# Patient Record
Sex: FEMALE | Race: WHITE | NOT HISPANIC OR LATINO | URBAN - METROPOLITAN AREA
[De-identification: names, ages, dates, MRNs, and addresses within clinical notes are randomized per-mention and may not be internally consistent; named-entity substitution may affect disease eponyms.]

---

## 2017-01-17 ENCOUNTER — OFFICE (AMBULATORY)
Dept: URBAN - METROPOLITAN AREA CLINIC 75 | Facility: CLINIC | Age: 67
End: 2017-01-17

## 2017-01-17 VITALS
DIASTOLIC BLOOD PRESSURE: 78 MMHG | HEIGHT: 68 IN | WEIGHT: 240 LBS | SYSTOLIC BLOOD PRESSURE: 136 MMHG | HEART RATE: 68 BPM

## 2017-01-17 DIAGNOSIS — Z86.010 PERSONAL HISTORY OF COLONIC POLYPS: ICD-10-CM

## 2017-01-17 DIAGNOSIS — R14.0 ABDOMINAL DISTENSION (GASEOUS): ICD-10-CM

## 2017-01-17 DIAGNOSIS — K21.9 GASTRO-ESOPHAGEAL REFLUX DISEASE WITHOUT ESOPHAGITIS: ICD-10-CM

## 2017-01-17 DIAGNOSIS — R11.0 NAUSEA: ICD-10-CM

## 2017-01-17 DIAGNOSIS — K58.9 IRRITABLE BOWEL SYNDROME WITHOUT DIARRHEA: ICD-10-CM

## 2017-01-17 DIAGNOSIS — K30 FUNCTIONAL DYSPEPSIA: ICD-10-CM

## 2017-01-17 PROCEDURE — 99204 OFFICE O/P NEW MOD 45 MIN: CPT | Performed by: INTERNAL MEDICINE

## 2017-01-17 RX ORDER — OMEPRAZOLE 40 MG/1
80 CAPSULE, DELAYED RELEASE ORAL
Qty: 60 | Refills: 6 | Status: COMPLETED
End: 2019-10-22

## 2017-07-18 ENCOUNTER — OFFICE (AMBULATORY)
Dept: URBAN - METROPOLITAN AREA CLINIC 75 | Facility: CLINIC | Age: 67
End: 2017-07-18

## 2017-07-18 VITALS
HEART RATE: 60 BPM | WEIGHT: 241 LBS | HEIGHT: 68 IN | SYSTOLIC BLOOD PRESSURE: 124 MMHG | DIASTOLIC BLOOD PRESSURE: 80 MMHG

## 2017-07-18 DIAGNOSIS — Z86.010 PERSONAL HISTORY OF COLONIC POLYPS: ICD-10-CM

## 2017-07-18 DIAGNOSIS — K59.00 CONSTIPATION, UNSPECIFIED: ICD-10-CM

## 2017-07-18 DIAGNOSIS — K29.50 UNSPECIFIED CHRONIC GASTRITIS WITHOUT BLEEDING: ICD-10-CM

## 2017-07-18 DIAGNOSIS — K58.9 IRRITABLE BOWEL SYNDROME WITHOUT DIARRHEA: ICD-10-CM

## 2017-07-18 DIAGNOSIS — K21.9 GASTRO-ESOPHAGEAL REFLUX DISEASE WITHOUT ESOPHAGITIS: ICD-10-CM

## 2017-07-18 DIAGNOSIS — R14.0 ABDOMINAL DISTENSION (GASEOUS): ICD-10-CM

## 2017-07-18 DIAGNOSIS — K30 FUNCTIONAL DYSPEPSIA: ICD-10-CM

## 2017-07-18 DIAGNOSIS — K14.6 GLOSSODYNIA: ICD-10-CM

## 2017-07-18 PROCEDURE — 99213 OFFICE O/P EST LOW 20 MIN: CPT | Performed by: INTERNAL MEDICINE

## 2017-07-18 RX ORDER — CLONAZEPAM 0.5 MG/1
1.5 TABLET ORAL
Qty: 45 | Refills: 2 | Status: COMPLETED
Start: 2017-07-18 | End: 2017-08-21

## 2017-07-18 RX ORDER — OMEPRAZOLE 40 MG/1
80 CAPSULE, DELAYED RELEASE ORAL
Qty: 60 | Refills: 6 | Status: COMPLETED
End: 2019-10-22

## 2017-08-17 VITALS
DIASTOLIC BLOOD PRESSURE: 74 MMHG | HEART RATE: 68 BPM | HEIGHT: 68 IN | DIASTOLIC BLOOD PRESSURE: 79 MMHG | RESPIRATION RATE: 13 BRPM | SYSTOLIC BLOOD PRESSURE: 139 MMHG | HEART RATE: 63 BPM | DIASTOLIC BLOOD PRESSURE: 93 MMHG | DIASTOLIC BLOOD PRESSURE: 87 MMHG | SYSTOLIC BLOOD PRESSURE: 160 MMHG | WEIGHT: 241 LBS | HEART RATE: 70 BPM | SYSTOLIC BLOOD PRESSURE: 159 MMHG | DIASTOLIC BLOOD PRESSURE: 77 MMHG | OXYGEN SATURATION: 94 % | RESPIRATION RATE: 3 BRPM | OXYGEN SATURATION: 99 % | OXYGEN SATURATION: 76 % | RESPIRATION RATE: 20 BRPM | SYSTOLIC BLOOD PRESSURE: 156 MMHG | SYSTOLIC BLOOD PRESSURE: 161 MMHG | RESPIRATION RATE: 16 BRPM | SYSTOLIC BLOOD PRESSURE: 149 MMHG | DIASTOLIC BLOOD PRESSURE: 78 MMHG | SYSTOLIC BLOOD PRESSURE: 138 MMHG | HEART RATE: 59 BPM | TEMPERATURE: 98 F | HEART RATE: 60 BPM | TEMPERATURE: 98.6 F | OXYGEN SATURATION: 96 % | OXYGEN SATURATION: 98 % | HEART RATE: 65 BPM | OXYGEN SATURATION: 97 %

## 2017-08-21 ENCOUNTER — OFFICE (AMBULATORY)
Dept: URBAN - METROPOLITAN AREA CLINIC 64 | Facility: CLINIC | Age: 67
End: 2017-08-21

## 2017-08-21 ENCOUNTER — AMBULATORY SURGICAL CENTER (AMBULATORY)
Dept: URBAN - METROPOLITAN AREA SURGERY 17 | Facility: SURGERY | Age: 67
End: 2017-08-21
Payer: MEDICAID

## 2017-08-21 DIAGNOSIS — K21.9 GASTRO-ESOPHAGEAL REFLUX DISEASE WITHOUT ESOPHAGITIS: ICD-10-CM

## 2017-08-21 DIAGNOSIS — R11.0 NAUSEA: ICD-10-CM

## 2017-08-21 DIAGNOSIS — R14.0 ABDOMINAL DISTENSION (GASEOUS): ICD-10-CM

## 2017-08-21 DIAGNOSIS — K31.7 POLYP OF STOMACH AND DUODENUM: ICD-10-CM

## 2017-08-21 DIAGNOSIS — Z87.11 PERSONAL HISTORY OF PEPTIC ULCER DISEASE: ICD-10-CM

## 2017-08-21 DIAGNOSIS — K14.6 GLOSSODYNIA: ICD-10-CM

## 2017-08-21 LAB
GI HISTOLOGY: A. UNSPECIFIED: (no result)
GI HISTOLOGY: B. UNSPECIFIED: (no result)
GI HISTOLOGY: C. SELECT: (no result)
GI HISTOLOGY: D. UNSPECIFIED: (no result)
GI HISTOLOGY: PDF REPORT: (no result)

## 2017-08-21 PROCEDURE — 88305 TISSUE EXAM BY PATHOLOGIST: CPT | Performed by: INTERNAL MEDICINE

## 2017-08-21 PROCEDURE — 43239 EGD BIOPSY SINGLE/MULTIPLE: CPT | Performed by: INTERNAL MEDICINE

## 2017-08-21 RX ADMIN — PROPOFOL 100 MG: 10 INJECTION, EMULSION INTRAVENOUS at 15:07

## 2017-08-21 RX ADMIN — PROPOFOL 75 MG: 10 INJECTION, EMULSION INTRAVENOUS at 15:16

## 2017-08-21 RX ADMIN — PROPOFOL 25 MG: 10 INJECTION, EMULSION INTRAVENOUS at 15:08

## 2017-08-21 RX ADMIN — PROPOFOL 50 MG: 10 INJECTION, EMULSION INTRAVENOUS at 15:11

## 2017-12-19 ENCOUNTER — OFFICE (AMBULATORY)
Dept: URBAN - METROPOLITAN AREA CLINIC 75 | Facility: CLINIC | Age: 67
End: 2017-12-19
Payer: MEDICAID

## 2017-12-19 VITALS
DIASTOLIC BLOOD PRESSURE: 70 MMHG | HEART RATE: 64 BPM | WEIGHT: 246 LBS | SYSTOLIC BLOOD PRESSURE: 140 MMHG | HEIGHT: 68 IN

## 2017-12-19 DIAGNOSIS — R11.0 NAUSEA: ICD-10-CM

## 2017-12-19 DIAGNOSIS — K21.9 GASTRO-ESOPHAGEAL REFLUX DISEASE WITHOUT ESOPHAGITIS: ICD-10-CM

## 2017-12-19 DIAGNOSIS — Z86.010 PERSONAL HISTORY OF COLONIC POLYPS: ICD-10-CM

## 2017-12-19 DIAGNOSIS — K30 FUNCTIONAL DYSPEPSIA: ICD-10-CM

## 2017-12-19 DIAGNOSIS — R14.0 ABDOMINAL DISTENSION (GASEOUS): ICD-10-CM

## 2017-12-19 DIAGNOSIS — K31.89 OTHER DISEASES OF STOMACH AND DUODENUM: ICD-10-CM

## 2017-12-19 DIAGNOSIS — K58.9 IRRITABLE BOWEL SYNDROME WITHOUT DIARRHEA: ICD-10-CM

## 2017-12-19 DIAGNOSIS — K59.00 CONSTIPATION, UNSPECIFIED: ICD-10-CM

## 2017-12-19 DIAGNOSIS — K14.6 GLOSSODYNIA: ICD-10-CM

## 2017-12-19 DIAGNOSIS — K29.50 UNSPECIFIED CHRONIC GASTRITIS WITHOUT BLEEDING: ICD-10-CM

## 2017-12-19 DIAGNOSIS — K29.70 GASTRITIS, UNSPECIFIED, WITHOUT BLEEDING: ICD-10-CM

## 2017-12-19 PROCEDURE — 99212 OFFICE O/P EST SF 10 MIN: CPT | Performed by: NURSE PRACTITIONER

## 2017-12-19 RX ORDER — OMEPRAZOLE 40 MG/1
80 CAPSULE, DELAYED RELEASE ORAL
Qty: 60 | Refills: 6 | Status: COMPLETED
End: 2019-10-22

## 2018-02-23 ENCOUNTER — OFFICE VISIT CONVERTED (OUTPATIENT)
Dept: CARDIOLOGY | Facility: CLINIC | Age: 68
End: 2018-02-23
Attending: INTERNAL MEDICINE

## 2018-02-23 ENCOUNTER — CONVERSION ENCOUNTER (OUTPATIENT)
Dept: CARDIOLOGY | Facility: CLINIC | Age: 68
End: 2018-02-23

## 2018-03-21 ENCOUNTER — OFFICE (AMBULATORY)
Dept: URBAN - METROPOLITAN AREA CLINIC 75 | Facility: CLINIC | Age: 68
End: 2018-03-21

## 2018-03-21 VITALS
WEIGHT: 245 LBS | HEART RATE: 68 BPM | SYSTOLIC BLOOD PRESSURE: 106 MMHG | DIASTOLIC BLOOD PRESSURE: 68 MMHG | HEIGHT: 68 IN

## 2018-03-21 DIAGNOSIS — K30 FUNCTIONAL DYSPEPSIA: ICD-10-CM

## 2018-03-21 DIAGNOSIS — K21.9 GASTRO-ESOPHAGEAL REFLUX DISEASE WITHOUT ESOPHAGITIS: ICD-10-CM

## 2018-03-21 DIAGNOSIS — K14.6 GLOSSODYNIA: ICD-10-CM

## 2018-03-21 DIAGNOSIS — K29.70 GASTRITIS, UNSPECIFIED, WITHOUT BLEEDING: ICD-10-CM

## 2018-03-21 DIAGNOSIS — R14.0 ABDOMINAL DISTENSION (GASEOUS): ICD-10-CM

## 2018-03-21 DIAGNOSIS — R11.0 NAUSEA: ICD-10-CM

## 2018-03-21 DIAGNOSIS — K29.50 UNSPECIFIED CHRONIC GASTRITIS WITHOUT BLEEDING: ICD-10-CM

## 2018-03-21 DIAGNOSIS — K58.9 IRRITABLE BOWEL SYNDROME WITHOUT DIARRHEA: ICD-10-CM

## 2018-03-21 DIAGNOSIS — K59.00 CONSTIPATION, UNSPECIFIED: ICD-10-CM

## 2018-03-21 DIAGNOSIS — K31.84 GASTROPARESIS: ICD-10-CM

## 2018-03-21 DIAGNOSIS — Z86.010 PERSONAL HISTORY OF COLONIC POLYPS: ICD-10-CM

## 2018-03-21 PROCEDURE — 99213 OFFICE O/P EST LOW 20 MIN: CPT | Performed by: INTERNAL MEDICINE

## 2018-03-21 RX ORDER — ERYTHROMYCIN ETHYLSUCCINATE 200 MG/5ML
4000 GRANULE, FOR SUSPENSION ORAL
Qty: 600 | Refills: 0 | Status: COMPLETED
Start: 2018-03-21 | End: 2018-06-12

## 2018-03-21 RX ORDER — PROCHLORPERAZINE MALEATE 10 MG/1
30 TABLET ORAL
Qty: 90 | Refills: 6 | Status: COMPLETED
Start: 2018-03-21 | End: 2018-06-12

## 2018-04-24 ENCOUNTER — OFFICE (AMBULATORY)
Dept: URBAN - METROPOLITAN AREA CLINIC 75 | Facility: CLINIC | Age: 68
End: 2018-04-24
Payer: MEDICAID

## 2018-04-24 VITALS
SYSTOLIC BLOOD PRESSURE: 118 MMHG | HEART RATE: 64 BPM | HEIGHT: 68 IN | WEIGHT: 248 LBS | DIASTOLIC BLOOD PRESSURE: 70 MMHG

## 2018-04-24 DIAGNOSIS — K14.6 GLOSSODYNIA: ICD-10-CM

## 2018-04-24 DIAGNOSIS — K31.89 OTHER DISEASES OF STOMACH AND DUODENUM: ICD-10-CM

## 2018-04-24 DIAGNOSIS — K29.50 UNSPECIFIED CHRONIC GASTRITIS WITHOUT BLEEDING: ICD-10-CM

## 2018-04-24 DIAGNOSIS — R14.0 ABDOMINAL DISTENSION (GASEOUS): ICD-10-CM

## 2018-04-24 DIAGNOSIS — R11.0 NAUSEA: ICD-10-CM

## 2018-04-24 DIAGNOSIS — K59.00 CONSTIPATION, UNSPECIFIED: ICD-10-CM

## 2018-04-24 DIAGNOSIS — K21.9 GASTRO-ESOPHAGEAL REFLUX DISEASE WITHOUT ESOPHAGITIS: ICD-10-CM

## 2018-04-24 DIAGNOSIS — K58.9 IRRITABLE BOWEL SYNDROME WITHOUT DIARRHEA: ICD-10-CM

## 2018-04-24 DIAGNOSIS — K30 FUNCTIONAL DYSPEPSIA: ICD-10-CM

## 2018-04-24 DIAGNOSIS — K31.84 GASTROPARESIS: ICD-10-CM

## 2018-04-24 DIAGNOSIS — K29.70 GASTRITIS, UNSPECIFIED, WITHOUT BLEEDING: ICD-10-CM

## 2018-04-24 PROCEDURE — 99214 OFFICE O/P EST MOD 30 MIN: CPT | Performed by: NURSE PRACTITIONER

## 2018-05-09 ENCOUNTER — OFFICE VISIT CONVERTED (OUTPATIENT)
Dept: OTOLARYNGOLOGY | Facility: CLINIC | Age: 68
End: 2018-05-09
Attending: OTOLARYNGOLOGY

## 2018-05-23 ENCOUNTER — OFFICE VISIT CONVERTED (OUTPATIENT)
Dept: OTOLARYNGOLOGY | Facility: CLINIC | Age: 68
End: 2018-05-23
Attending: OTOLARYNGOLOGY

## 2018-05-23 ENCOUNTER — CONVERSION ENCOUNTER (OUTPATIENT)
Dept: OTOLARYNGOLOGY | Facility: CLINIC | Age: 68
End: 2018-05-23

## 2018-06-12 ENCOUNTER — OFFICE (AMBULATORY)
Dept: URBAN - METROPOLITAN AREA CLINIC 75 | Facility: CLINIC | Age: 68
End: 2018-06-12
Payer: MEDICAID

## 2018-06-12 VITALS
HEIGHT: 68 IN | DIASTOLIC BLOOD PRESSURE: 80 MMHG | SYSTOLIC BLOOD PRESSURE: 142 MMHG | HEART RATE: 70 BPM | WEIGHT: 250 LBS

## 2018-06-12 DIAGNOSIS — K59.00 CONSTIPATION, UNSPECIFIED: ICD-10-CM

## 2018-06-12 DIAGNOSIS — K21.9 GASTRO-ESOPHAGEAL REFLUX DISEASE WITHOUT ESOPHAGITIS: ICD-10-CM

## 2018-06-12 DIAGNOSIS — K58.9 IRRITABLE BOWEL SYNDROME WITHOUT DIARRHEA: ICD-10-CM

## 2018-06-12 DIAGNOSIS — K31.84 GASTROPARESIS: ICD-10-CM

## 2018-06-12 DIAGNOSIS — K30 FUNCTIONAL DYSPEPSIA: ICD-10-CM

## 2018-06-12 DIAGNOSIS — R11.0 NAUSEA: ICD-10-CM

## 2018-06-12 DIAGNOSIS — K29.50 UNSPECIFIED CHRONIC GASTRITIS WITHOUT BLEEDING: ICD-10-CM

## 2018-06-12 DIAGNOSIS — K29.70 GASTRITIS, UNSPECIFIED, WITHOUT BLEEDING: ICD-10-CM

## 2018-06-12 DIAGNOSIS — K14.6 GLOSSODYNIA: ICD-10-CM

## 2018-06-12 DIAGNOSIS — R14.0 ABDOMINAL DISTENSION (GASEOUS): ICD-10-CM

## 2018-06-12 DIAGNOSIS — K31.89 OTHER DISEASES OF STOMACH AND DUODENUM: ICD-10-CM

## 2018-06-12 PROCEDURE — 99214 OFFICE O/P EST MOD 30 MIN: CPT | Performed by: NURSE PRACTITIONER

## 2018-08-24 ENCOUNTER — CONVERSION ENCOUNTER (OUTPATIENT)
Dept: CARDIOLOGY | Facility: CLINIC | Age: 68
End: 2018-08-24

## 2018-08-24 ENCOUNTER — OFFICE VISIT CONVERTED (OUTPATIENT)
Dept: CARDIOLOGY | Facility: CLINIC | Age: 68
End: 2018-08-24
Attending: INTERNAL MEDICINE

## 2018-10-09 ENCOUNTER — OFFICE (AMBULATORY)
Dept: URBAN - METROPOLITAN AREA CLINIC 75 | Facility: CLINIC | Age: 68
End: 2018-10-09

## 2018-10-09 VITALS
WEIGHT: 254 LBS | DIASTOLIC BLOOD PRESSURE: 84 MMHG | HEIGHT: 68 IN | SYSTOLIC BLOOD PRESSURE: 132 MMHG | HEART RATE: 68 BPM

## 2018-10-09 DIAGNOSIS — K30 FUNCTIONAL DYSPEPSIA: ICD-10-CM

## 2018-10-09 DIAGNOSIS — K21.9 GASTRO-ESOPHAGEAL REFLUX DISEASE WITHOUT ESOPHAGITIS: ICD-10-CM

## 2018-10-09 DIAGNOSIS — K14.6 GLOSSODYNIA: ICD-10-CM

## 2018-10-09 DIAGNOSIS — R11.0 NAUSEA: ICD-10-CM

## 2018-10-09 DIAGNOSIS — K31.84 GASTROPARESIS: ICD-10-CM

## 2018-10-09 DIAGNOSIS — K29.50 UNSPECIFIED CHRONIC GASTRITIS WITHOUT BLEEDING: ICD-10-CM

## 2018-10-09 DIAGNOSIS — Z86.010 PERSONAL HISTORY OF COLONIC POLYPS: ICD-10-CM

## 2018-10-09 DIAGNOSIS — R14.0 ABDOMINAL DISTENSION (GASEOUS): ICD-10-CM

## 2018-10-09 DIAGNOSIS — K58.9 IRRITABLE BOWEL SYNDROME WITHOUT DIARRHEA: ICD-10-CM

## 2018-10-09 DIAGNOSIS — K59.00 CONSTIPATION, UNSPECIFIED: ICD-10-CM

## 2018-10-09 PROBLEM — K31.89 OTHER DISEASES OF STOMACH AND DUODENUM: Status: INACTIVE | Noted: 2017-08-21

## 2018-10-09 PROCEDURE — 99214 OFFICE O/P EST MOD 30 MIN: CPT | Performed by: INTERNAL MEDICINE

## 2018-10-09 RX ORDER — ALPRAZOLAM 0.5 MG/1
1.5 TABLET, EXTENDED RELEASE ORAL
Qty: 90 | Refills: 2 | Status: ACTIVE

## 2019-04-08 VITALS
SYSTOLIC BLOOD PRESSURE: 129 MMHG | HEART RATE: 69 BPM | HEIGHT: 68 IN | WEIGHT: 258 LBS | DIASTOLIC BLOOD PRESSURE: 80 MMHG

## 2019-04-09 ENCOUNTER — OFFICE (AMBULATORY)
Dept: URBAN - METROPOLITAN AREA CLINIC 75 | Facility: CLINIC | Age: 69
End: 2019-04-09

## 2019-04-09 DIAGNOSIS — K29.50 UNSPECIFIED CHRONIC GASTRITIS WITHOUT BLEEDING: ICD-10-CM

## 2019-04-09 DIAGNOSIS — K31.84 GASTROPARESIS: ICD-10-CM

## 2019-04-09 DIAGNOSIS — K21.9 GASTRO-ESOPHAGEAL REFLUX DISEASE WITHOUT ESOPHAGITIS: ICD-10-CM

## 2019-04-09 DIAGNOSIS — K59.00 CONSTIPATION, UNSPECIFIED: ICD-10-CM

## 2019-04-09 DIAGNOSIS — K30 FUNCTIONAL DYSPEPSIA: ICD-10-CM

## 2019-04-09 DIAGNOSIS — R14.0 ABDOMINAL DISTENSION (GASEOUS): ICD-10-CM

## 2019-04-09 DIAGNOSIS — R11.0 NAUSEA: ICD-10-CM

## 2019-04-09 DIAGNOSIS — Z86.010 PERSONAL HISTORY OF COLONIC POLYPS: ICD-10-CM

## 2019-04-09 DIAGNOSIS — K29.70 GASTRITIS, UNSPECIFIED, WITHOUT BLEEDING: ICD-10-CM

## 2019-04-09 DIAGNOSIS — K58.9 IRRITABLE BOWEL SYNDROME WITHOUT DIARRHEA: ICD-10-CM

## 2019-04-09 DIAGNOSIS — K14.6 GLOSSODYNIA: ICD-10-CM

## 2019-04-09 PROCEDURE — 99213 OFFICE O/P EST LOW 20 MIN: CPT | Performed by: INTERNAL MEDICINE

## 2019-10-22 ENCOUNTER — OFFICE (AMBULATORY)
Dept: URBAN - METROPOLITAN AREA CLINIC 75 | Facility: CLINIC | Age: 69
End: 2019-10-22
Payer: MEDICAID

## 2019-10-22 VITALS
HEIGHT: 68 IN | HEART RATE: 58 BPM | WEIGHT: 257 LBS | SYSTOLIC BLOOD PRESSURE: 133 MMHG | DIASTOLIC BLOOD PRESSURE: 81 MMHG

## 2019-10-22 DIAGNOSIS — K59.00 CONSTIPATION, UNSPECIFIED: ICD-10-CM

## 2019-10-22 DIAGNOSIS — K21.9 GASTRO-ESOPHAGEAL REFLUX DISEASE WITHOUT ESOPHAGITIS: ICD-10-CM

## 2019-10-22 DIAGNOSIS — R14.0 ABDOMINAL DISTENSION (GASEOUS): ICD-10-CM

## 2019-10-22 DIAGNOSIS — K29.70 GASTRITIS, UNSPECIFIED, WITHOUT BLEEDING: ICD-10-CM

## 2019-10-22 DIAGNOSIS — K58.9 IRRITABLE BOWEL SYNDROME WITHOUT DIARRHEA: ICD-10-CM

## 2019-10-22 DIAGNOSIS — R11.0 NAUSEA: ICD-10-CM

## 2019-10-22 DIAGNOSIS — K29.50 UNSPECIFIED CHRONIC GASTRITIS WITHOUT BLEEDING: ICD-10-CM

## 2019-10-22 DIAGNOSIS — K14.6 GLOSSODYNIA: ICD-10-CM

## 2019-10-22 DIAGNOSIS — Z86.010 PERSONAL HISTORY OF COLONIC POLYPS: ICD-10-CM

## 2019-10-22 DIAGNOSIS — K31.84 GASTROPARESIS: ICD-10-CM

## 2019-10-22 DIAGNOSIS — K30 FUNCTIONAL DYSPEPSIA: ICD-10-CM

## 2019-10-22 PROBLEM — Z87.11 PERSONAL HISTORY OF PEPTIC ULCER DISEASE: Status: ACTIVE | Noted: 2017-08-21

## 2019-10-22 PROCEDURE — 99214 OFFICE O/P EST MOD 30 MIN: CPT | Performed by: INTERNAL MEDICINE

## 2019-10-22 RX ORDER — ONDANSETRON HYDROCHLORIDE 8 MG/1
24 TABLET, FILM COATED ORAL
Qty: 60 | Refills: 6 | Status: ACTIVE
Start: 2019-10-22

## 2020-01-08 ENCOUNTER — OFFICE VISIT CONVERTED (OUTPATIENT)
Dept: CARDIOLOGY | Facility: CLINIC | Age: 70
End: 2020-01-08
Attending: INTERNAL MEDICINE

## 2020-01-21 ENCOUNTER — OFFICE VISIT CONVERTED (OUTPATIENT)
Dept: GASTROENTEROLOGY | Facility: CLINIC | Age: 70
End: 2020-01-21
Attending: NURSE PRACTITIONER

## 2020-01-22 ENCOUNTER — HOSPITAL ENCOUNTER (OUTPATIENT)
Dept: GASTROENTEROLOGY | Facility: HOSPITAL | Age: 70
Setting detail: HOSPITAL OUTPATIENT SURGERY
Discharge: HOME | End: 2020-01-22
Attending: INTERNAL MEDICINE

## 2020-01-28 ENCOUNTER — HOSPITAL ENCOUNTER (OUTPATIENT)
Dept: CT IMAGING | Facility: HOSPITAL | Age: 70
Discharge: HOME | End: 2020-01-28
Attending: NURSE PRACTITIONER

## 2020-02-04 ENCOUNTER — OFFICE VISIT CONVERTED (OUTPATIENT)
Dept: CARDIOLOGY | Facility: CLINIC | Age: 70
End: 2020-02-04
Attending: INTERNAL MEDICINE

## 2020-02-04 ENCOUNTER — HOSPITAL ENCOUNTER (OUTPATIENT)
Dept: LAB | Facility: HOSPITAL | Age: 70
Discharge: HOME | End: 2020-02-04
Attending: INTERNAL MEDICINE

## 2020-02-04 LAB — BNP SERPL-MCNC: 47 PG/ML (ref 0–900)

## 2020-02-27 ENCOUNTER — HOSPITAL ENCOUNTER (OUTPATIENT)
Dept: SLEEP MEDICINE | Facility: HOSPITAL | Age: 70
Discharge: HOME | End: 2020-02-27
Attending: INTERNAL MEDICINE

## 2020-03-03 ENCOUNTER — HOSPITAL ENCOUNTER (OUTPATIENT)
Dept: SLEEP MEDICINE | Facility: HOSPITAL | Age: 70
Discharge: HOME | End: 2020-03-03
Attending: INTERNAL MEDICINE

## 2020-03-09 ENCOUNTER — OUTSIDE FACILITY SERVICE (OUTPATIENT)
Dept: SLEEP MEDICINE | Facility: HOSPITAL | Age: 70
End: 2020-03-09

## 2020-03-09 PROCEDURE — 95806 SLEEP STUDY UNATT&RESP EFFT: CPT | Performed by: INTERNAL MEDICINE

## 2020-03-17 ENCOUNTER — OFFICE VISIT CONVERTED (OUTPATIENT)
Dept: GASTROENTEROLOGY | Facility: CLINIC | Age: 70
End: 2020-03-17
Attending: NURSE PRACTITIONER

## 2020-05-18 ENCOUNTER — TELEMEDICINE CONVERTED (OUTPATIENT)
Dept: GASTROENTEROLOGY | Facility: CLINIC | Age: 70
End: 2020-05-18
Attending: NURSE PRACTITIONER

## 2020-07-02 ENCOUNTER — HOSPITAL ENCOUNTER (OUTPATIENT)
Dept: GENERAL RADIOLOGY | Facility: HOSPITAL | Age: 70
Discharge: HOME | End: 2020-07-02
Attending: ANESTHESIOLOGY

## 2020-07-08 ENCOUNTER — HOSPITAL ENCOUNTER (OUTPATIENT)
Dept: LAB | Facility: HOSPITAL | Age: 70
Discharge: HOME | End: 2020-07-08
Attending: INTERNAL MEDICINE

## 2020-07-08 LAB
ALBUMIN SERPL-MCNC: 4.6 G/DL (ref 3.5–5)
ALBUMIN/GLOB SERPL: 1.7 {RATIO} (ref 1.4–2.6)
ALP SERPL-CCNC: 90 U/L (ref 43–160)
ALT SERPL-CCNC: 26 U/L (ref 10–40)
ANION GAP SERPL CALC-SCNC: 21 MMOL/L (ref 8–19)
AST SERPL-CCNC: 29 U/L (ref 15–50)
BILIRUB SERPL-MCNC: 1.2 MG/DL (ref 0.2–1.3)
BUN SERPL-MCNC: 12 MG/DL (ref 5–25)
BUN/CREAT SERPL: 11 {RATIO} (ref 6–20)
CALCIUM SERPL-MCNC: 9.5 MG/DL (ref 8.7–10.4)
CHLORIDE SERPL-SCNC: 102 MMOL/L (ref 99–111)
CHOLEST SERPL-MCNC: 130 MG/DL (ref 107–200)
CHOLEST/HDLC SERPL: 2.8 {RATIO} (ref 3–6)
CONV CO2: 22 MMOL/L (ref 22–32)
CONV TOTAL PROTEIN: 7.3 G/DL (ref 6.3–8.2)
CREAT UR-MCNC: 1.1 MG/DL (ref 0.5–0.9)
GFR SERPLBLD BASED ON 1.73 SQ M-ARVRAT: 51 ML/MIN/{1.73_M2}
GLOBULIN UR ELPH-MCNC: 2.7 G/DL (ref 2–3.5)
GLUCOSE SERPL-MCNC: 89 MG/DL (ref 65–99)
HDLC SERPL-MCNC: 47 MG/DL (ref 40–60)
LDLC SERPL CALC-MCNC: 39 MG/DL (ref 70–100)
OSMOLALITY SERPL CALC.SUM OF ELEC: 291 MOSM/KG (ref 273–304)
POTASSIUM SERPL-SCNC: 4.3 MMOL/L (ref 3.5–5.3)
SODIUM SERPL-SCNC: 141 MMOL/L (ref 135–147)
TRIGL SERPL-MCNC: 222 MG/DL (ref 40–150)
VLDLC SERPL-MCNC: 44 MG/DL (ref 5–37)

## 2020-07-15 ENCOUNTER — OFFICE VISIT CONVERTED (OUTPATIENT)
Dept: CARDIOLOGY | Facility: CLINIC | Age: 70
End: 2020-07-15
Attending: INTERNAL MEDICINE

## 2020-08-18 ENCOUNTER — OFFICE VISIT CONVERTED (OUTPATIENT)
Dept: GASTROENTEROLOGY | Facility: CLINIC | Age: 70
End: 2020-08-18
Attending: NURSE PRACTITIONER

## 2020-08-24 ENCOUNTER — HOSPITAL ENCOUNTER (OUTPATIENT)
Dept: LAB | Facility: HOSPITAL | Age: 70
Discharge: HOME | End: 2020-08-24
Attending: INTERNAL MEDICINE

## 2020-08-24 LAB
ANION GAP SERPL CALC-SCNC: 16 MMOL/L (ref 8–19)
BUN SERPL-MCNC: 16 MG/DL (ref 5–25)
BUN/CREAT SERPL: 12 {RATIO} (ref 6–20)
CALCIUM SERPL-MCNC: 9.9 MG/DL (ref 8.7–10.4)
CHLORIDE SERPL-SCNC: 104 MMOL/L (ref 99–111)
CONV CO2: 24 MMOL/L (ref 22–32)
CREAT UR-MCNC: 1.31 MG/DL (ref 0.5–0.9)
GFR SERPLBLD BASED ON 1.73 SQ M-ARVRAT: 41 ML/MIN/{1.73_M2}
GLUCOSE SERPL-MCNC: 109 MG/DL (ref 65–99)
OSMOLALITY SERPL CALC.SUM OF ELEC: 292 MOSM/KG (ref 273–304)
POTASSIUM SERPL-SCNC: 4.4 MMOL/L (ref 3.5–5.3)
SODIUM SERPL-SCNC: 140 MMOL/L (ref 135–147)

## 2020-09-01 ENCOUNTER — HOSPITAL ENCOUNTER (OUTPATIENT)
Dept: LAB | Facility: HOSPITAL | Age: 70
Discharge: HOME | End: 2020-09-01
Attending: INTERNAL MEDICINE

## 2020-09-01 ENCOUNTER — HOSPITAL ENCOUNTER (OUTPATIENT)
Dept: GENERAL RADIOLOGY | Facility: HOSPITAL | Age: 70
Discharge: HOME | End: 2020-09-01
Attending: INTERNAL MEDICINE

## 2020-09-01 LAB
ALBUMIN SERPL-MCNC: 4.3 G/DL (ref 3.5–5)
ALBUMIN/GLOB SERPL: 1.5 {RATIO} (ref 1.4–2.6)
ALP SERPL-CCNC: 84 U/L (ref 43–160)
ALT SERPL-CCNC: 28 U/L (ref 10–40)
ANION GAP SERPL CALC-SCNC: 18 MMOL/L (ref 8–19)
AST SERPL-CCNC: 28 U/L (ref 15–50)
BASOPHILS # BLD AUTO: 0.05 10*3/UL (ref 0–0.2)
BASOPHILS NFR BLD AUTO: 0.5 % (ref 0–3)
BILIRUB SERPL-MCNC: 1.03 MG/DL (ref 0.2–1.3)
BNP SERPL-MCNC: 45 PG/ML (ref 0–900)
BUN SERPL-MCNC: 19 MG/DL (ref 5–25)
BUN/CREAT SERPL: 15 {RATIO} (ref 6–20)
CALCIUM SERPL-MCNC: 9.9 MG/DL (ref 8.7–10.4)
CHLORIDE SERPL-SCNC: 98 MMOL/L (ref 99–111)
CONV ABS IMM GRAN: 0.08 10*3/UL (ref 0–0.2)
CONV CO2: 25 MMOL/L (ref 22–32)
CONV IMMATURE GRAN: 0.9 % (ref 0–1.8)
CONV TOTAL PROTEIN: 7.1 G/DL (ref 6.3–8.2)
CREAT UR-MCNC: 1.26 MG/DL (ref 0.5–0.9)
DEPRECATED RDW RBC AUTO: 43.5 FL (ref 36.4–46.3)
EOSINOPHIL # BLD AUTO: 0.18 10*3/UL (ref 0–0.7)
EOSINOPHIL # BLD AUTO: 2 % (ref 0–7)
ERYTHROCYTE [DISTWIDTH] IN BLOOD BY AUTOMATED COUNT: 13.3 % (ref 11.7–14.4)
GFR SERPLBLD BASED ON 1.73 SQ M-ARVRAT: 43 ML/MIN/{1.73_M2}
GLOBULIN UR ELPH-MCNC: 2.8 G/DL (ref 2–3.5)
GLUCOSE SERPL-MCNC: 104 MG/DL (ref 65–99)
HCT VFR BLD AUTO: 44.4 % (ref 37–47)
HGB BLD-MCNC: 15.3 G/DL (ref 12–16)
LYMPHOCYTES # BLD AUTO: 2.85 10*3/UL (ref 1–5)
LYMPHOCYTES NFR BLD AUTO: 30.9 % (ref 20–45)
MCH RBC QN AUTO: 30.9 PG (ref 27–31)
MCHC RBC AUTO-ENTMCNC: 34.5 G/DL (ref 33–37)
MCV RBC AUTO: 89.7 FL (ref 81–99)
MONOCYTES # BLD AUTO: 0.87 10*3/UL (ref 0.2–1.2)
MONOCYTES NFR BLD AUTO: 9.4 % (ref 3–10)
NEUTROPHILS # BLD AUTO: 5.2 10*3/UL (ref 2–8)
NEUTROPHILS NFR BLD AUTO: 56.3 % (ref 30–85)
NRBC CBCN: 0 % (ref 0–0.7)
OSMOLALITY SERPL CALC.SUM OF ELEC: 287 MOSM/KG (ref 273–304)
PLATELET # BLD AUTO: 308 10*3/UL (ref 130–400)
PMV BLD AUTO: 10.7 FL (ref 9.4–12.3)
POTASSIUM SERPL-SCNC: 4 MMOL/L (ref 3.5–5.3)
RBC # BLD AUTO: 4.95 10*6/UL (ref 4.2–5.4)
SODIUM SERPL-SCNC: 137 MMOL/L (ref 135–147)
WBC # BLD AUTO: 9.23 10*3/UL (ref 4.8–10.8)

## 2020-09-23 ENCOUNTER — OFFICE VISIT CONVERTED (OUTPATIENT)
Dept: CARDIOLOGY | Facility: CLINIC | Age: 70
End: 2020-09-23
Attending: INTERNAL MEDICINE

## 2020-09-23 ENCOUNTER — CONVERSION ENCOUNTER (OUTPATIENT)
Dept: CARDIOLOGY | Facility: CLINIC | Age: 70
End: 2020-09-23

## 2020-10-22 ENCOUNTER — HOSPITAL ENCOUNTER (OUTPATIENT)
Dept: LAB | Facility: HOSPITAL | Age: 70
Discharge: HOME | End: 2020-10-22
Attending: INTERNAL MEDICINE

## 2020-10-22 ENCOUNTER — OFFICE VISIT CONVERTED (OUTPATIENT)
Dept: PULMONOLOGY | Facility: CLINIC | Age: 70
End: 2020-10-22
Attending: INTERNAL MEDICINE

## 2020-10-22 LAB — 25(OH)D3 SERPL-MCNC: 29.7 NG/ML (ref 30–100)

## 2020-10-23 LAB
A FUMIGATUS AB SER QL ID: <0.1 K[IU]/ML
AMER SYCAMORE IGE QN: <0.1 K[IU]/ML
BERMUDA GRASS IGE QN: <0.1 K[IU]/ML (ref 0–0.35)
BOXELDER IGE QN: <0.1 K[IU]/ML
CALIF WALNUT POLN IGE QN: <0.1 K[IU]/ML (ref 0–0.35)
CAT DANDER IGG QN: <0.1 K[IU]/ML (ref 0–0.35)
CLADOSPORIUM IGE: <0.1 K[IU]/ML
CMN PIGWEED IGE QN: <0.1 K[IU]/ML
COMMON RAGWEED IGE QN: <0.1 K[IU]/ML (ref 0–0.35)
COTTONWOOD IGE QN: <0.1 K[IU]/ML
D FARINAE IGE QN: <0.1 K[IU]/ML (ref 0–0.35)
D PTERONYSS IGE QN: <0.1 K[IU]/ML (ref 0–0.35)
DOG DANDER IGE QN: <0.1 K[IU]/ML (ref 0–0.35)
GOOSEFOOT IGE QN: <0.1 K[IU]/ML (ref 0–0.35)
IGE SERPL-ACNC: <2 K[IU]/ML (ref 0–24)
IMMUNOCAP RESULT: NORMAL (ref 0–0)
JOHNSON GRASS IGE QN: <0.1 K[IU]/ML (ref 0–0.35)
MEADOW FESCUE IGE QN: <0.1 K[IU]/ML (ref 0–0.35)
MOLD IGE: <0.1 K[IU]/ML (ref 0–0.35)
MOUSE URINE PROT IGE QN: <0.1 K[IU]/ML
MT JUNIPER IGE QN: <0.1 K[IU]/ML
OAK DUST IGE QN: <0.1 K[IU]/ML (ref 0–0.35)
P NOTATUM IGE QN: <0.1 K[IU]/ML
PECAN/HICK TREE IGE QN: <0.1 K[IU]/ML (ref 0–0.35)
ROACH IGE QN: <0.1 K[IU]/ML (ref 0–0.35)
TIMOTHY IGE QN: <0.1 K[IU]/ML
WHITE ASH IGE QN: <0.1 K[IU]/ML
WHITE BIRCH IGE QN: <0.1 K[IU]/ML (ref 0–0.35)
WHITE ELM IGE QN: <0.1 K[IU]/ML (ref 0–0.35)
WHITE MULBERRY IGE QN: <0.1 K[IU]/ML

## 2021-01-11 ENCOUNTER — HOSPITAL ENCOUNTER (OUTPATIENT)
Dept: CARDIOLOGY | Facility: HOSPITAL | Age: 71
Discharge: HOME | End: 2021-01-11
Attending: INTERNAL MEDICINE

## 2021-01-25 ENCOUNTER — OFFICE VISIT CONVERTED (OUTPATIENT)
Dept: PULMONOLOGY | Facility: CLINIC | Age: 71
End: 2021-01-25
Attending: SPECIALIST

## 2021-01-28 ENCOUNTER — HOSPITAL ENCOUNTER (OUTPATIENT)
Dept: CT IMAGING | Facility: HOSPITAL | Age: 71
Discharge: HOME | End: 2021-01-28
Attending: SPECIALIST

## 2021-01-28 LAB
CREAT BLD-MCNC: 1.2 MG/DL (ref 0.6–1.4)
GFR SERPLBLD BASED ON 1.73 SQ M-ARVRAT: 46 ML/MIN/{1.73_M2}

## 2021-02-01 LAB
A FUMIGATUS AB SER QL ID: <0.1 K[IU]/ML
A FUMIGATUS1 AB SER-ACNC: NEGATIVE
AMER SYCAMORE IGE QN: <0.1 K[IU]/ML
BERMUDA GRASS IGE QN: <0.1 K[IU]/ML (ref 0–0.35)
BOXELDER IGE QN: <0.1 K[IU]/ML
CALIF WALNUT POLN IGE QN: <0.1 K[IU]/ML (ref 0–0.35)
CAT DANDER IGG QN: <0.1 K[IU]/ML (ref 0–0.35)
CLADOSPORIUM IGE: <0.1 K[IU]/ML
CMN PIGWEED IGE QN: <0.1 K[IU]/ML
COMMON RAGWEED IGE QN: <0.1 K[IU]/ML (ref 0–0.35)
CONV AUREOBASIDIUM PULLULANS AB.: NEGATIVE
CONV MICROPOLYSPORA FAENI ABS.: NEGATIVE
CONV THERMOACTINOMYCES SACCHARI, ANTIBODIES: NEGATIVE
CONV THERMOACTINOMYCES VULGARIS #1 ABS: NEGATIVE
COTTONWOOD IGE QN: <0.1 K[IU]/ML
D FARINAE IGE QN: <0.1 K[IU]/ML (ref 0–0.35)
D PTERONYSS IGE QN: <0.1 K[IU]/ML (ref 0–0.35)
DOG DANDER IGE QN: <0.1 K[IU]/ML (ref 0–0.35)
GOOSEFOOT IGE QN: <0.1 K[IU]/ML (ref 0–0.35)
IGE SERPL-ACNC: 2 K[IU]/ML (ref 0–24)
IMMUNOCAP RESULT: NORMAL (ref 0–0)
JOHNSON GRASS IGE QN: <0.1 K[IU]/ML (ref 0–0.35)
MEADOW FESCUE IGE QN: <0.1 K[IU]/ML (ref 0–0.35)
MOLD IGE: <0.1 K[IU]/ML (ref 0–0.35)
MOUSE URINE PROT IGE QN: <0.1 K[IU]/ML
MT JUNIPER IGE QN: <0.1 K[IU]/ML
OAK DUST IGE QN: <0.1 K[IU]/ML (ref 0–0.35)
P NOTATUM IGE QN: <0.1 K[IU]/ML
PECAN/HICK TREE IGE QN: <0.1 K[IU]/ML (ref 0–0.35)
PIGEON SERUM AB QL ID: NEGATIVE
ROACH IGE QN: <0.1 K[IU]/ML (ref 0–0.35)
TIMOTHY IGE QN: <0.1 K[IU]/ML
WHITE ASH IGE QN: <0.1 K[IU]/ML
WHITE BIRCH IGE QN: <0.1 K[IU]/ML (ref 0–0.35)
WHITE ELM IGE QN: <0.1 K[IU]/ML (ref 0–0.35)
WHITE MULBERRY IGE QN: <0.1 K[IU]/ML

## 2021-02-04 ENCOUNTER — OFFICE VISIT CONVERTED (OUTPATIENT)
Dept: CARDIOLOGY | Facility: CLINIC | Age: 71
End: 2021-02-04
Attending: INTERNAL MEDICINE

## 2021-02-18 ENCOUNTER — CONVERSION ENCOUNTER (OUTPATIENT)
Dept: GASTROENTEROLOGY | Facility: CLINIC | Age: 71
End: 2021-02-18

## 2021-02-18 ENCOUNTER — OFFICE VISIT CONVERTED (OUTPATIENT)
Dept: GASTROENTEROLOGY | Facility: CLINIC | Age: 71
End: 2021-02-18
Attending: NURSE PRACTITIONER

## 2021-04-08 ENCOUNTER — OFFICE VISIT CONVERTED (OUTPATIENT)
Dept: PULMONOLOGY | Facility: CLINIC | Age: 71
End: 2021-04-08
Attending: INTERNAL MEDICINE

## 2021-04-12 ENCOUNTER — OFFICE VISIT CONVERTED (OUTPATIENT)
Dept: CARDIOLOGY | Facility: CLINIC | Age: 71
End: 2021-04-12
Attending: INTERNAL MEDICINE

## 2021-04-22 ENCOUNTER — CONVERSION ENCOUNTER (OUTPATIENT)
Dept: CARDIOLOGY | Facility: CLINIC | Age: 71
End: 2021-04-22
Attending: INTERNAL MEDICINE

## 2021-04-22 ENCOUNTER — CONVERSION ENCOUNTER (OUTPATIENT)
Dept: CARDIOLOGY | Facility: CLINIC | Age: 71
End: 2021-04-22

## 2021-04-22 ENCOUNTER — OFFICE VISIT CONVERTED (OUTPATIENT)
Dept: CARDIOLOGY | Facility: CLINIC | Age: 71
End: 2021-04-22
Attending: INTERNAL MEDICINE

## 2021-04-29 ENCOUNTER — HOSPITAL ENCOUNTER (OUTPATIENT)
Dept: LAB | Facility: HOSPITAL | Age: 71
Discharge: HOME | End: 2021-04-29
Attending: NURSE PRACTITIONER

## 2021-04-29 LAB
ANION GAP SERPL CALC-SCNC: 18 MMOL/L (ref 8–19)
BUN SERPL-MCNC: 23 MG/DL (ref 5–25)
BUN/CREAT SERPL: 17 {RATIO} (ref 6–20)
CALCIUM SERPL-MCNC: 9.4 MG/DL (ref 8.7–10.4)
CHLORIDE SERPL-SCNC: 98 MMOL/L (ref 99–111)
CONV CO2: 25 MMOL/L (ref 22–32)
CREAT UR-MCNC: 1.37 MG/DL (ref 0.5–0.9)
GFR SERPLBLD BASED ON 1.73 SQ M-ARVRAT: 39 ML/MIN/{1.73_M2}
GLUCOSE SERPL-MCNC: 98 MG/DL (ref 65–99)
OSMOLALITY SERPL CALC.SUM OF ELEC: 288 MOSM/KG (ref 273–304)
POTASSIUM SERPL-SCNC: 4.2 MMOL/L (ref 3.5–5.3)
SODIUM SERPL-SCNC: 137 MMOL/L (ref 135–147)

## 2021-05-04 ENCOUNTER — CONVERSION ENCOUNTER (OUTPATIENT)
Dept: CARDIOLOGY | Facility: CLINIC | Age: 71
End: 2021-05-04
Attending: SPECIALIST

## 2021-05-06 ENCOUNTER — OFFICE VISIT CONVERTED (OUTPATIENT)
Dept: CARDIOLOGY | Facility: CLINIC | Age: 71
End: 2021-05-06
Attending: INTERNAL MEDICINE

## 2021-05-06 ENCOUNTER — CONVERSION ENCOUNTER (OUTPATIENT)
Dept: OTHER | Facility: HOSPITAL | Age: 71
End: 2021-05-06

## 2021-05-11 NOTE — PROCEDURES
Procedure Note      Patient Name: Nica Traylor   Patient ID: [id number]   Sex: Female   YOB: 1950    Primary Care Provider: Wilder Morales   Referring Provider: Matthias Palmer    Visit Date: April 22, 2021    Provider: Chris Manzo MD   Location: [facility]   Location Address: [address] [place] [address], [facility]   [place], [place]  [id number]   Location Phone: [phone]          FINAL REPORT   HOLTER MONITOR REPORT  Date: 04/22/2021   Indication: Atrial Fibrillation      Interpretation Date: 04/27/2021    24-HOUR HOLTER MONITOR    FINDINGS:  The patient underwent a 24-hour Holter monitor.  The average heart rate was 71 beats per minute.  The   maximum heart rate was 146 beats per minute.  The minimum heart rate was 53 beats per minute.  The baseline rhythm was normal sinus rhythm, but there were some paroxysmal atrial fibrillation episodes totaling 2.77%.  Diary events were entered. There were 8 patient-triggered events.     IMPRESSION:     1.  Normal average heart rate of 71 beats per minute.    2.  There were PVCs totaling 4,118 beats or 3.99% of all beats.  3.  There were PACs totaling 8,244beats or 7.98 % of all beats.      Chris Manzo MD  /pap                   Electronically Signed by: Kerry Matthews-, Other -Author on April 28, 2021 07:44:03 AM  Electronically Co-signed by: Chris Manzo MD -Reviewer on April 28, 2021 12:50:24 PM

## 2021-05-13 NOTE — PROGRESS NOTES
"   Quick Note      Patient Name: Nica Traylor   Patient ID: [id number]   Sex: Female   YOB: 1950    Primary Care Provider: Wilder Morales   Referring Provider: Matthias Palmer    Visit Date: May 18, 2020    Provider: PRAVIN Franklin   Location: [facility]   Location Address: [address] [place] [address], [address]  [place], [place]  [id number]   Location Phone: [phone]          History Of Present Illness  TELEHEALTH TELEPHONE VISIT  Chief Complaint: f/u abdominal bloating, atypical chest pain and reflux esophagitis   Nica Traylor is a 69 year old /White female who is presenting for evaluation via telehealth telephone visit. Verbal consent obtained before beginning visit.   Provider spent 17 minutes with the patient during telehealth visit.   The following staff were present during this visit: Neida Pearl MA, PRAVIN Franklin   Past Medical History/Overview of Patient Symptoms     She  continues to experience abdominal bloating that is bothersome and effects her ability to eat.  She feels that probiotic is helping some with belching.  Denies heartburn.  States that she's nauseous constantly and doesn't feel like eating.  Prescribed compro 10 mg per PCP with improvement.  She is taking this as needed.      She feels that chest pain is caused by bulging disc in neck.  She has an appt. with pulmonology for further w/u.      Reports a bowel movement every 1-2 days.  States that stool is formed.  If she has more than one bowel movement per day, then experiences loose stool.  Admits abdominal cramping just before bowel movements.  Admits seeing a small amt. hemorrhoidal bleeding if she has to have more than one bowel movement per day.           Vitals  Date Time BP Position Site L\R Cuff Size HR RR TEMP (F) WT  HT  BMI kg/m2 BSA m2 O2 Sat        05/18/2020 10:26 AM         251lbs 16oz 5'  8\" 38.32 2.34   "             Assessment  · Nausea     787.02/R11.0  · Anorexia     783.0/R63.0  · Abdominal bloating     787.3/R14.0  · Irritable bowel syndrome with diarrhea     564.1/K58.0      Plan  · Orders  o Physician Telephone Evaluation, 11-20 minutes (39596) - - 05/18/2020  · Medications  o Xifaxan 550 mg oral tablet   SIG: take 1 tablet (550 mg) by oral route 3 times per day for 14 days   DISP: (42) tablets with 2 refills  Prescribed on 05/18/2020     o Florajen3 460 mg (7.5-6- 1.5 bill. cell) oral capsule   SIG: take 1 capsule by oral route daily   DISP: (30) capsules with 5 refills  Refilled on 05/18/2020     o Protonix 40 mg oral tablet,delayed release (DR/EC)   SIG: take 1 tablet (40 mg) by oral route once daily   DISP: (30) tablets with 5 refills  Refilled on 05/18/2020     · Instructions  o Plan Of Care: Trial of xifaxin for IBS-D. Keep appt. with pulmonology for atypical chest pain.  · Disposition  o Follow up 3 months            Electronically Signed by: PRAVIN Franklin -Author on May 18, 2020 12:05:46 PM

## 2021-05-13 NOTE — PROGRESS NOTES
Progress Note      Patient Name: Nica Traylor   Patient ID: [id number]   Sex: Female   YOB: 1950    Primary Care Provider: Wilder Morales   Referring Provider: Matthias Palmer    Visit Date: August 18, 2020    Provider: PRAVIN Franklin   Location: [facility]   Location Address: [address] [place] [address], [address]  [place], [place]  [id number]   Location Phone: [phone]          Chief Complaint  · Follow up IBS      History Of Present Illness     Ms. Traylor presents for follow-up of nausea, anorexia, abdominal bloating and IBS-D.      Recommend trial of Xifaxan at last visit.  However, this was not affordable and she was unable to get it.    She reports that she's receiving injections for disc narrowing and bulging disc in neck.  She is followed by pain management.      Reports being diagnosed with dystolic dysfunction per cardiology recently.      She reports that she's having a bowel movement every 1-2 days with florajen.  She feels that this is working well.  She continues to experience some bloating with meals, but feels better when taking florajen.      She continues to experience nausea.             Past Medical History  Burning mouth syndrome; Depression; GERD; Hyperlipidemia; Hypertension; Oral lesion         Past Surgical History  EGD; Gallbladder; Hysterectomy         Medication List  acetaminophen 500 mg oral tablet; Aldactone 25 mg oral tablet; alprazolam 0.5 mg oral tablet; atorvastatin 20 mg oral tablet; citalopram 20 mg oral tablet; clobetasol 0.05 % topical gel; Estrace 0.01 % (0.1 mg/gram) vaginal cream; Florajen3 460 mg (7.5-6- 1.5 bill. cell) oral capsule; gabapentin 300 mg oral capsule; hydrochlorothiazide 12.5 mg oral tablet; hydrochlorothiazide 25 mg oral tablet; hydrocodone-acetaminophen  mg oral tablet; Lasix 20 mg oral tablet; promethazine 25 mg oral tablet; Protonix 40 mg oral tablet,delayed release (DR/EC); Senna Laxative 8.6 mg oral  "tablet; Toprol XL 25 mg oral tablet extended release 24 hr         Allergy List  Mercury; PENICILLINS; Reglan       Allergies Reconciled  Family Medical History  Stroke; Heart Disease; Diabetes; Family history of cervical cancer; Family history of pancreatic cancer; No family history of colorectal cancer; Family history of uterine cancer         Social History  Alcohol use (Never); ; lives alone; Retired; Tobacco (Former)         Review of Systems  · Constitutional  o Denies  o : chills, fever  · Cardiovascular  o Denies  o : chest pain, irregular heart beats  · Respiratory  o Denies  o : cough, shortness of breath  · Gastrointestinal  o Admits  o : see HPI   · Endocrine  o Denies  o : weight gain, weight loss      Vitals  Date Time BP Position Site L\R Cuff Size HR RR TEMP (F) WT  HT  BMI kg/m2 BSA m2 O2 Sat HC       08/18/2020 10:45 /62 Sitting      97.6 261lbs 2oz 5'  8\" 39.7 2.38           Physical Examination  · Constitutional  o Appearance  o : Healthy-appearing, awake and alert in no acute distress  · Head and Face  o Head  o : Normocephalic with no worriesome skin lesions  · Eyes  o Vision  o :   § Visual Fields  § : eyes move symmetrical in all directions  o Sclerae  o : sclerae anicteric  o Pupils and Irises  o : pupils equal and symmetrical  · Neck  o Inspection/Palpation  o : Trachea is midline, no adenopathy  · Respiratory  o Respiratory Effort  o : Breathing is unlabored.  o Inspection of Chest  o : normal appearance  o Auscultation of Lungs  o : Chest is clear to auscultation bilaterally.  · Cardiovascular  o Heart  o :   § Auscultation of Heart  § : no murmurs, rubs, or gallops  o Peripheral Vascular System  o :   § Extremities  § : no cyanosis, clubbing or edema;   · Gastrointestinal  o Abdominal Examination  o : Abdomen is soft, nontender to palpation, with normal active bowel sounds, no appreciable hepatosplenomegaly.  o Digital Rectal Exam  o : deferred  · Skin and Subcutaneous " Tissue  o General Inspection  o : without focal lesions; turgor is normal  · Psychiatric  o General  o : Alert and oriented x3  o Mood and Affect  o : Mood and affect are appropriate to circumstances  · Extremities  o Extremities  o : No edema, no cyanosis          Assessment  · Gastroesophageal Reflux     530.81/K21.9  · Irritable bowel syndrome with both constipation and diarrhea     564.1/K58.2    Problems Reconciled  Plan  · Medications  o Protonix 40 mg oral tablet,delayed release (DR/EC)   SIG: take 1 tablet (40 mg) by oral route once daily   DISP: (30) tablets with 5 refills  Refilled on 08/18/2020     o Medications have been Reconciled  o Transition of Care or Provider Policy  · Instructions  o Information given on current diagnoses.  · Disposition  o Follow up 6 months            Electronically Signed by: PRAVIN Franklin -Author on August 18, 2020 11:27:09 AM

## 2021-05-13 NOTE — PROGRESS NOTES
Progress Note      Patient Name: Nica Traylor   Patient ID: [id number]   Sex: Female   YOB: 1950    Primary Care Provider: Wilder Morales   Referring Provider: Matthias Palmer    Visit Date: July 15, 2020    Provider: Chris Manzo MD   Location: [facility]   Location Address: [address] [place] [address] A   [place], [place]  [id number]   Location Phone: [phone]          Chief Complaint     Coronary artery disease.  Hyperlipidemia.       History Of Present Illness  REFERRING CARE PROVIDER: Matthias Palmer   Nica Traylor is a 69 year old /White female with a history of nonocclusive coronary artery disease, hypertension, dyslipidemia, and reflux disease who suffers from chronic intermittent chest discomfort, as well as arm numbness. She was recently found to have some cervical disc disease and is undergoing further evaluation with MRI. She has no anginal chest pain. She has no shortness of breath on exertion.   PAST MEDICAL HISTORY: Coronary artery disease; Dyslipidemia; GERD; Hypertension.   FAMILY HISTORY: Positive for diabetes mellitus, hypertension, and heart disease.   PSYCHOSOCIAL HISTORY: Previously smoked, but quit. Rarely consumes alcohol. Denies mood changes or depression.   CURRENT MEDICATIONS: Baclofen 10 mg q. h.s.; mirtazapine 7.5 mg q. h.s.; gabapentin 300 mg q. h.s.; atorvastatin 40 mg daily; pantoprazole 40 mg daily; hydrochlorothiazide 25 mg daily; prochlorperazine 10 mg p.r.n.; citalopram 20 mg daily; multivitamin daily; hydrocortisone; Benadryl 25 mg daily; Toprol XL 25 mg half q. h.s.       Review of Systems  · Cardiovascular  o Admits  o : shortness of breath while walking or lying flat  o Denies  o : palpitations (fast, fluttering, or skipping beats), swelling (feet, ankles, hands), chest pain or angina pectoris   · Respiratory  o Denies  o : chronic or frequent cough, asthma or wheezing      Vitals  Date Time BP Position Site L\R Cuff  "Size HR RR TEMP (F) WT  HT  BMI kg/m2 BSA m2 O2 Sat        07/15/2020 10:49 /78 Sitting    78 - R   264lbs 0oz 5'  8\" 40.14 2.4           Physical Examination  · Constitutional  o Appearance  o : Awake, alert, in no acute distress.   · Eyes  o Conjunctivae  o : Normal.  · Ears, Nose, Mouth and Throat  o Oral Cavity  o :   § Oral Mucosa  § : Normal.  · Neck  o Inspection/Palpation  o : No JVD. Good carotid upstroke. No thyromegaly.  · Respiratory  o Respiratory  o : Good respiratory effort. Clear to percussion and auscultation.  · Cardiovascular  o Heart  o :   § Auscultation of Heart  § : S1, S2 normal. Regular rate and rhythm without murmurs, gallops, or rubs.  o Peripheral Vascular System  o :   § Extremities  § : +1 lower extremity edema.   · Gastrointestinal  o Abdominal Examination  o : Soft. No tenderness or masses felt. No hepatosplenomegaly. Abdominal aorta is not palpable.  · Labs  o Labs  o : Creatinine 1.1, potassium 4.3.          Assessment     ASSESSMENT & PLAN:    1.  Coronary artery disease, nonocclusive in nature, mild.  Recommend chronic aspirin 81 mg once a day.  2.  Hypertension, controlled.  Patient can discontinue the Toprol at this point.  If needed any further treatment,        will add Aldactone as patient does have some evidence of diastolic dysfunction on her echocardiogram.      Chris Manzo MD  JH:vm             Electronically Signed by: Shazia Anaya-, Other -Author on July 21, 2020 07:08:50 AM  Electronically Co-signed by: Chris Manzo MD -Reviewer on July 27, 2020 10:54:02 AM  "

## 2021-05-13 NOTE — PROGRESS NOTES
Progress Note      Patient Name: Nica Traylor   Patient ID: [id number]   Sex: Female   YOB: 1950    Primary Care Provider: Wilder Morales   Referring Provider: Matthias Palmer    Visit Date: September 23, 2020    Provider: Chris Manzo MD   Location: [facility]   Location Address: [address] [place] [address], [facility]   [place], [place]  [id number]   Location Phone: [phone]          Chief Complaint     Shortness of breath.       History Of Present Illness  REFERRING CARE PROVIDER: Matthias aPlmer   Nica Traylor is a 69 year old /White female with a history of nonocclusive mild CAD, hypertension, dyslipidemia, and reflux disease who has had persistent issues limiting her functional capacity with shortness of breath with exertion primarily. She states that she does notice it when she lays down at night, as well. She has not had any increased edema. No increased weight gain. She has chronic cough issues. No fever or chills.   PAST MEDICAL HISTORY: Chronic renal failure; Coronary artery disease; Dyslipidemia; GERD; Hypertension.   FAMILY HISTORY: Positive for diabetes mellitus, hypertension, and heart disease.   PSYCHOSOCIAL HISTORY: Current smoker of half-a-pack per day. Rarely consumes alcohol.   CURRENT MEDICATIONS: Gabapentin 300 mg 2 q. h.s.; hydrochlorothiazide 25 mg daily; baclofen 10 mg q. h.s.; mirtazapine 7.5 mg q. h.s.; spironolactone 25 mg every other day; citalopram 20 mg daily; pantoprazole 40 mg daily; prochlorperazine 10 mg p.r.n.; atorvastatin 40 mg daily.       Review of Systems  · Cardiovascular  o Admits  o : shortness of breath while walking or lying flat  o Denies  o : palpitations (fast, fluttering, or skipping beats), swelling (feet, ankles, hands), chest pain or angina pectoris   · Respiratory  o Admits  o : chronic or frequent cough  o Denies  o : asthma or wheezing      Vitals  Date Time BP Position Site L\R Cuff Size HR RR TEMP (F) WT  HT  BMI kg/m2 BSA m2  "O2 Sat        09/23/2020 12:09 /72 Sitting    62 - R   262lbs 0oz 5'  8\" 39.84 2.39           Physical Examination  · Constitutional  o Appearance  o : Awake, alert, in no acute distress.   · Eyes  o Conjunctivae  o : Normal.  · Ears, Nose, Mouth and Throat  o Oral Cavity  o :   § Oral Mucosa  § : Normal.  · Neck  o Inspection/Palpation  o : No JVD. Good carotid upstroke. No thyromegaly.  · Respiratory  o Respiratory  o : Good respiratory effort. Clear to percussion and auscultation.  · Cardiovascular  o Heart  o :   § Auscultation of Heart  § : S1, S2 normal. Regular rate and rhythm without murmurs, gallops, or rubs.  o Peripheral Vascular System  o :   § Extremities  § : Good femoral and pedal pulses. No pedal edema.  · Gastrointestinal  o Abdominal Examination  o : Soft. No tenderness or masses felt. No hepatosplenomegaly. Abdominal aorta is not palpable.  · Imaging  o Imaging  o : Chest X-ray showed some chronic changes, but no evidence of pulmonary edema or acute cardiac issues.   · Labs  o Labs  o : Hemoglobin 41.0. Creatinine 1.26. LDL cholesterol 39. NT-proBNP 45.           Assessment     ASSESSMENT & PLAN:    Dyspnea on exertion.  Patient with worsening symptoms, she states with limiting ambulation to about 200 yards before being out of breath.  She had previously had an echocardiogram, which showed grade I diastolic changes, which can be normal for her age.  She did not have any subjective evidence, such as left atrial enlargement, elevated PA pressures, or increased filling pressure seen on echocardiogram, and was more diagnostic for diastolic issues.  In addition, her invasive cardiac workup had been within normal limits when the LV pressure was measured directly on heart catherization.  Her proBNP and chest X-ray were not consistent with any CHF.  I feel based on the collection of data it is unlikely that CHF is the primary underlying cause.  She is scheduled to see Pulmonary, and previously has " been told that she may have some underlying COPD, although have not been able to find any documentation of this.  Will await their workup before deciding on any other possible cardiac workup for etiology of her symptoms.          Chris Manzo MD  JH:             Electronically Signed by: Shazia Anaya-, Other -Author on September 29, 2020 07:33:44 AM  Electronically Co-signed by: Chris Manzo MD -Reviewer on September 29, 2020 12:41:48 PM

## 2021-05-14 VITALS
WEIGHT: 261.12 LBS | SYSTOLIC BLOOD PRESSURE: 132 MMHG | HEIGHT: 68 IN | BODY MASS INDEX: 39.57 KG/M2 | DIASTOLIC BLOOD PRESSURE: 62 MMHG | TEMPERATURE: 97.6 F

## 2021-05-14 VITALS
DIASTOLIC BLOOD PRESSURE: 78 MMHG | HEART RATE: 74 BPM | WEIGHT: 268 LBS | BODY MASS INDEX: 40.62 KG/M2 | HEIGHT: 68 IN | SYSTOLIC BLOOD PRESSURE: 134 MMHG

## 2021-05-14 VITALS
WEIGHT: 270 LBS | SYSTOLIC BLOOD PRESSURE: 126 MMHG | HEIGHT: 68 IN | DIASTOLIC BLOOD PRESSURE: 64 MMHG | BODY MASS INDEX: 40.92 KG/M2 | HEART RATE: 54 BPM

## 2021-05-14 VITALS
SYSTOLIC BLOOD PRESSURE: 134 MMHG | BODY MASS INDEX: 40.32 KG/M2 | HEIGHT: 68 IN | HEART RATE: 72 BPM | WEIGHT: 266 LBS | DIASTOLIC BLOOD PRESSURE: 86 MMHG

## 2021-05-14 VITALS
SYSTOLIC BLOOD PRESSURE: 128 MMHG | HEIGHT: 68 IN | DIASTOLIC BLOOD PRESSURE: 72 MMHG | BODY MASS INDEX: 39.71 KG/M2 | WEIGHT: 262 LBS | HEART RATE: 62 BPM

## 2021-05-14 VITALS
HEIGHT: 68 IN | WEIGHT: 266.12 LBS | SYSTOLIC BLOOD PRESSURE: 124 MMHG | BODY MASS INDEX: 40.33 KG/M2 | DIASTOLIC BLOOD PRESSURE: 69 MMHG

## 2021-05-14 NOTE — PROGRESS NOTES
"   Progress Note      Patient Name: Nica Traylor   Patient ID: [id number]   Sex: Female   YOB: 1950    Primary Care Provider: Wilder Morales   Referring Provider: Matthias Palmer    Visit Date: April 22, 2021    Provider: Chris Manzo MD   Location: [facility]   Location Address: [address] [place] [address], [facility]   [place], [place]  [id number]   Location Phone: [phone]          Chief Complaint     Atrial fibrillation.       History Of Present Illness  REFERRING CARE PROVIDER: Matthias Palmer   Nica Traylor is a 70 year old /White female with a recent diagnosis of paroxysmal atrial fibrillation, previous asthma/COPD, nonocclusive coronary artery disease, and hypertension who has had episodes recently of increasing shortness of breath, sometimes tachycardia, fatigue, and a tingling sensation in her chest. She was recently seen in the emergency room for worsening shortness of breath issues, and at that time, underwent workup and evaluation and found to have evidence of new pulmonary edema on chest X-rays, concerning for CHF. The patient was in normal sinus rhythm at that time.   PAST MEDICAL HISTORY: Chronic renal failure; Coronary artery disease; Dyslipidemia; GERD; Hypertension; Asthma.   PSYCHOSOCIAL HISTORY: Current smoker of 1/2 pack per day. Denies alcohol use.   CURRENT MEDICATIONS: Medications reviewed and are as documented.      ALLERGIES: Erythromycin; Mercury; PENICILLIN; Reglan.       Review of Systems  · Cardiovascular  o Admits  o : swelling (feet, ankles, hands), shortness of breath while walking or lying flat  o Denies  o : palpitations (fast, fluttering, or skipping beats), chest pain or angina pectoris   · Respiratory  o Denies  o : chronic or frequent cough      Vitals  Date Time BP Position Site L\R Cuff Size HR RR TEMP (F) WT  HT  BMI kg/m2 BSA m2 O2 Sat FR L/min FiO2 HC       04/22/2021 01:13 /78 Sitting    74 - R   268lbs 0oz 5'  8\" 40.75 2.42     "         Physical Examination  · Constitutional  o Appearance  o : Awake, alert, in no acute distress.   · Eyes  o Conjunctivae  o : Normal.  · Ears, Nose, Mouth and Throat  o Oral Cavity  o :   § Oral Mucosa  § : Normal.  · Neck  o Inspection/Palpation  o : No JVD. Good carotid upstroke. No thyromegaly.  · Respiratory  o Respiratory  o : Good respiratory effort. Clear to percussion and auscultation.  · Cardiovascular  o Heart  o :   § Auscultation of Heart  § : S1, S2 normal. Regular rate and rhythm without murmurs, gallops, or rubs.  o Peripheral Vascular System  o :   § Extremities  § : +1 bilateral lower extremity edema. Good femoral and pedal pulses.   · Gastrointestinal  o Abdominal Examination  o : Soft. No tenderness or masses felt. No hepatosplenomegaly. Abdominal aorta is not palpable.  · Imaging  o Imaging  o : Patient had a CT of her chest, which showed no PE. Did show moderate upper lobe centrilobular emphysematous changes and biapical pleural parenchymal scarring.          Assessment     ASSESSMENT & PLAN:    1.  Diastolic congestive heart failure, new onset.  Given pulmonary edema pattern seen on chest X-ray, as well        as cardiomegaly consistent with CHF, recommend changing her hydrochlorothiazide to        Aldactazide 25-25 mg and continue with her Lasix 20 mg, but since she has had improvement in her        symptoms and adding the Aldactone, will go to every other day on her Lasix.  Repeat a renal panel in two        weeks.  Will also check an echocardiogram and see patient back in two weeks to see how she is doing        symptomatically and titrate up further as needed.    2.  Paroxysmal atrial fibrillation.  Unclear if she is having recurrent episodes, which may be contributing to her        CHF and shortness of breath symptoms.  Will get a 24-hour Holter monitor.  Also encouraged her to get an        Apple watch to see if she can correlate better the dysrhythmia with symptoms.  3.   Asthma/COPD.  Also likely contributing to cause of her symptoms.  4.  Obesity.  Counseled on the importance of weight loss, as well as keeping a daily weight log, given her CHF        issues to help titrate diuretic therapy in the future.             Electronically Signed by: Shazia Anaya-, Other -Author on April 27, 2021 01:18:56 PM  Electronically Co-signed by: Chris Manzo MD -Reviewer on April 28, 2021 12:53:19 PM

## 2021-05-14 NOTE — PROGRESS NOTES
Progress Note      Patient Name: Nica Traylor   Patient ID: [id number]   Sex: Female   YOB: 1950    Primary Care Provider: Wilder Morales   Referring Provider: Matthias Palmer    Visit Date: April 12, 2021    Provider: Chris Manzo MD   Location: [facility]   Location Address: [address] [place] [address], [facility]   [place], [place]  [id number]   Location Phone: [phone]          Chief Complaint     Atrial fibrillation.       History Of Present Illness  REFERRING CARE PROVIDER: Matthias Palmer   Nica Traylor is a 70 year old /White female with nonocclusive CAD, hypertension, and dyslipidemia who was recently diagnosed with atrial fibrillation. Patient reports no tachycardia, shortness of breath, edema, or chest pain issues. She does have some mild dyspnea on exertion, but overall, symptom-wise, states that she is feeling well.   PAST MEDICAL HISTORY: Chronic renal failure; Coronary artery disease; Dyslipidemia; GERD; Hypertension; Asthma.   PSYCHOSOCIAL HISTORY: Previous tobacco use, but quit. Denies alcohol use.   CURRENT MEDICATIONS: Eliquis 5 mg b.i.d.; metoprolol 25 mg b.i.d.; albuterol inhaler; alprazolam 0.5 mg; atorvastatin 40 mg daily; baclofen 10 mg q. h.s.; Benadryl; cholecalciferol; citalopram 20 mg daily; diclofenac gel; estradiol vaginal cream; famotidine 20 mg daily; gabapentin 300 mg b.i.d.; hydrochlorothiazide 25 mg daily; hydrocodone-acetaminophen  mg p.r.n.; mirtazapine 7.5 mg q. h.s.; multivitamin; pantoprazole 40 mg daily; Perforomist; prochlorperazine 5 mg; promethazine 25 mg q. 6 h. p.r.n.; spironolactone 25 mg daily; Symbicort; Yupelri.      ALLERGIES: Erythromycin; penicillin; Reglan; mercury.       Review of Systems  · Cardiovascular  o Admits  o : shortness of breath while walking or lying flat  o Denies  o : palpitations (fast, fluttering, or skipping beats), swelling (feet, ankles, hands), chest pain or angina pectoris  "  · Respiratory  o Denies  o : chronic or frequent cough      Vitals  Date Time BP Position Site L\R Cuff Size HR RR TEMP (F) WT  HT  BMI kg/m2 BSA m2 O2 Sat FR L/min FiO2 HC       04/12/2021 11:17 /64 Sitting    54 - R   270lbs 0oz 5'  8\" 41.05 2.42             Physical Examination  · Constitutional  o Appearance  o : Awake, alert, in no acute distress.   · Eyes  o Conjunctivae  o : Normal.  · Ears, Nose, Mouth and Throat  o Oral Cavity  o :   § Oral Mucosa  § : Normal.  · Neck  o Inspection/Palpation  o : No JVD. Good carotid upstroke. No thyromegaly.  · Respiratory  o Respiratory  o : Good respiratory effort. Clear to percussion and auscultation.  · Cardiovascular  o Heart  o :   § Auscultation of Heart  § : Irregularly irregular.   o Peripheral Vascular System  o :   § Extremities  § : Good femoral and pedal pulses. No pedal edema.  · Gastrointestinal  o Abdominal Examination  o : Soft. No tenderness or masses felt. No hepatosplenomegaly. Abdominal aorta is not palpable.  · EKG  o EKG  o : Her EKG shows atrial fibrillation.  · Labs  o Labs  o : Creatinine 1.2.          Assessment     ASSESSMENT & PLAN:    1.  Atrial fibrillation, persistent but rate controlled and asymptomatic.  Agree with anticoagulation with        Eliquis 5 mg b.i.d. given her CHADS-VASc score of 3.  Patient, given the lack of any symptoms, was not        interested in pursuing cardioversion.  Did encourage exercise and weight loss if possible.  Will also check a        baseline proBNP level.    2.  Coronary artery disease, nonocclusive in nature.  Symptomatically stable with no ongoing anginal issues.  3.  Hyperlipidemia.  Patient on atorvastatin, moderate intensity, 40 mg.  Continue with this dose.  No overt        symptoms.  Check a Lipid before next visit.  Goal LDL of less than 70.             Electronically Signed by: Shazia Anaya-, Other -Author on April 17, 2021 07:58:56 PM  Electronically Co-signed by: " Chris Manzo MD -Reviewer on April 26, 2021 09:48:43 AM

## 2021-05-14 NOTE — PROGRESS NOTES
Progress Note      Patient Name: Nica Traylor   Patient ID: [id number]   Sex: Female   YOB: 1950    Primary Care Provider: Wilder Morales   Referring Provider: Matthias Palmer    Visit Date: February 18, 2021    Provider: PRAVIN Franklin   Location: [facility]   Location Address: [address] [place] [address], [address]  [place], [place]  [id number]   Location Phone: [phone]          Chief Complaint  · Follow up GERD  · Follow up IBS      History Of Present Illness     Ms. Traylor is a 69 y/o female who presents for f/u of GERD and IBS-mixed.      She is still struggling with blisters on her tongue.  If she uses any other kind of toothpaste other than baby toothpaste, she has burning in mouth and on tongue.  Previously tried amitriptyline and clonazepam with no improvement. She has been evaluated by several ENT's in the past with no success.      States that florajen is working well for IBS.  She is no longer experiencing alternating between diarrhea and constipation.      Feels that heartburn is controlled with protonix.  Denies dysphagia.       Past Medical History  Burning mouth syndrome; Depression; GERD; Hyperlipidemia; Hypertension; Oral lesion         Past Surgical History  EGD; Gallbladder; Hysterectomy         Medication List  acetaminophen 500 mg oral tablet; Aldactone 25 mg oral tablet; alprazolam 0.5 mg oral tablet; atorvastatin 20 mg oral tablet; baclofen 10 mg oral tablet; citalopram 20 mg oral tablet; clobetasol 0.05 % topical gel; Estrace 0.01 % (0.1 mg/gram) vaginal cream; Florajen3 460 mg (7.5-6- 1.5 bill. cell) oral capsule; gabapentin 300 mg oral capsule; hydrochlorothiazide 12.5 mg oral tablet; hydrochlorothiazide 25 mg oral tablet; hydrocodone-acetaminophen  mg oral tablet; Lasix 20 mg oral tablet; mirtazapine 15 mg oral tablet; prochlorperazine maleate 5 mg oral tablet; promethazine 25 mg oral tablet; Protonix 40 mg oral  "tablet,delayed release (DR/EC); Senna Laxative 8.6 mg oral tablet; Symbicort 160-4.5 mcg/actuation inhalation HFA aerosol inhaler; Toprol XL 25 mg oral tablet extended release 24 hr         Allergy List  Mercury; PENICILLINS; Reglan       Allergies Reconciled  Family Medical History  Stroke; Heart Disease; Diabetes; Family history of cervical cancer; Family history of pancreatic cancer; No family history of colorectal cancer; Family history of uterine cancer         Social History  Alcohol use (Never); ; lives alone; Retired; Tobacco (Former)         Review of Systems  · Constitutional  o Denies  o : chills, fever  · Cardiovascular  o Denies  o : chest pain, irregular heart beats  · Respiratory  o Denies  o : cough, shortness of breath  · Gastrointestinal  o Admits  o : see HPI   · Endocrine  o Denies  o : weight gain, weight loss      Vitals  Date Time BP Position Site L\R Cuff Size HR RR TEMP (F) WT  HT  BMI kg/m2 BSA m2 O2 Sat FR L/min FiO2 HC       02/18/2021 11:09 /69 Sitting       266lbs 2oz 5'  8\" 40.46 2.41             Physical Examination  · Constitutional  o Appearance  o : Healthy-appearing, awake and alert in no acute distress  · Head and Face  o Head  o : Normocephalic with no worriesome skin lesions  · Eyes  o Vision  o :   § Visual Fields  § : eyes move symmetrical in all directions  o Sclerae  o : sclerae anicteric  o Pupils and Irises  o : pupils equal and symmetrical  · Neck  o Inspection/Palpation  o : Trachea is midline, no adenopathy  · Respiratory  o Respiratory Effort  o : Breathing is unlabored.  o Inspection of Chest  o : normal appearance  o Auscultation of Lungs  o : Chest is clear to auscultation bilaterally.  · Cardiovascular  o Heart  o :   § Auscultation of Heart  § : no murmurs, rubs, or gallops  o Peripheral Vascular System  o :   § Extremities  § : no cyanosis, clubbing or edema;   · Gastrointestinal  o Abdominal Examination  o : Abdomen is soft, nontender to " palpation, with normal active bowel sounds, no appreciable hepatosplenomegaly.  o Digital Rectal Exam  o : deferred  · Skin and Subcutaneous Tissue  o General Inspection  o : without focal lesions; turgor is normal  · Psychiatric  o General  o : Alert and oriented x3  o Mood and Affect  o : Mood and affect are appropriate to circumstances  · Extremities  o Extremities  o : No edema, no cyanosis          Assessment  · Gastroesophageal Reflux     530.81/K21.9  · Irritable bowel syndrome with both constipation and diarrhea     564.1/K58.2  · Mouth pain     528.9/K13.79      Plan  · Medications  o nystatin 100,000 unit/mL oral suspension   SIG: take 5 milliliters (500,000 unit) by oral route 4 times per day for 14 days   DISP: (280) Milliliter with 0 refills  Prescribed on 02/18/2021     o Protonix 40 mg oral tablet,delayed release (DR/EC)   SIG: take 1 tablet (40 mg) by oral route once daily for 90 days   DISP: (90) Tablet with 1 refills  Refilled on 02/18/2021     o Medications have been Reconciled  · Instructions  o Information given on current diagnoses. nystatin, if no improvement then try magic mouthwash.   · Disposition  o Follow up 6 months            Electronically Signed by: PRAVIN Franklin -Author on February 18, 2021 12:05:12 PM

## 2021-05-14 NOTE — PROGRESS NOTES
"   Progress Note      Patient Name: Nica Traylor   Patient ID: [id number]   Sex: Female   YOB: 1950    Primary Care Provider: Wilder Morales   Referring Provider: Matthias Palmer    Visit Date: February 4, 2021    Provider: Chris Manzo MD   Location: [facility]   Location Address: [address] [place] [address], [facility]   [place], [place]  [id number]   Location Phone: [phone]          History Of Present Illness  REFERRING CARE PROVIDER: Wilder Morales   Nica Traylor is a 70 year old /White female with history of nonocclusive mild CAD, hypertension, dyslipidemia, and recent diagnosed of asthma, who has had stable shortness of breath with exertion. No chest pain problems.   PAST MEDICAL HISTORY: Chronic renal failure; Coronary artery disease; Dyslipidemia; GERD; Hypertension; Asthma.   PSYCHOSOCIAL HISTORY: Denies alcohol use. Previous use of tobacco, but quit.   CURRENT MEDICATIONS: Medications have been reviewed and are as stated.      ALLERGIES: Penicillin; Reglan; Erythromycin; Mercury.       Review of Systems  · Cardiovascular  o Denies  o : palpitations (fast, fluttering, or skipping beats), swelling (feet, ankles, hands), shortness of breath while walking or lying flat, chest pain or angina pectoris   · Respiratory  o Denies  o : chronic or frequent cough      Vitals  Date Time BP Position Site L\R Cuff Size HR RR TEMP (F) WT  HT  BMI kg/m2 BSA m2 O2 Sat FR L/min FiO2 HC       02/04/2021 10:16 /86 Sitting    72 - R   266lbs 0oz 5'  8\" 40.44 2.41             Physical Examination  · Constitutional  o Appearance  o : Awake, alert, in no acute distress.   · Eyes  o Conjunctivae  o : Normal.  · Ears, Nose, Mouth and Throat  o Oral Cavity  o :   § Oral Mucosa  § : Normal.  · Neck  o Inspection/Palpation  o : No JVD. Good carotid upstroke. No thyromegaly.  · Respiratory  o Respiratory  o : Good respiratory effort. Clear to percussion and " auscultation.  · Cardiovascular  o Heart  o :   § Auscultation of Heart  § : S1, S2 normal. Regular rate and rhythm without murmurs, gallops, or rubs.  o Peripheral Vascular System  o :   § Extremities  § : Good femoral and pedal pulses. No pedal edema.  · Gastrointestinal  o Abdominal Examination  o : Soft. No tenderness or masses felt. No hepatosplenomegaly. Abdominal aorta is not palpable.  · EKG  o EKG  o : Performed in the office today.  o Indications  o : Coronary artery disease.  o Results  o : Normal sinus rhythm.  o Comparison  o : No changes from prior EKGs.          Assessment     ASSESSMENT & PLAN:    1.  Coronary artery disease, minimal.  The patient is on chronic aspirin 81 mg once a day.   2.  Hyperlipidemia.  On statin. Goal LDL of less than 70.   3.  Asthma.   4.  Hypertension, controlled.     Chris Manzo MD  JH/rt                Electronically Signed by: Danisha Berman-, Other -Author on February 19, 2021 09:37:22 AM  Electronically Co-signed by: Chris Manzo MD -Reviewer on February 26, 2021 11:02:24 AM

## 2021-05-15 VITALS
DIASTOLIC BLOOD PRESSURE: 72 MMHG | SYSTOLIC BLOOD PRESSURE: 128 MMHG | BODY MASS INDEX: 38.19 KG/M2 | WEIGHT: 252 LBS | HEART RATE: 68 BPM | HEIGHT: 68 IN

## 2021-05-15 VITALS
HEIGHT: 68 IN | WEIGHT: 254 LBS | TEMPERATURE: 97.7 F | BODY MASS INDEX: 38.49 KG/M2 | DIASTOLIC BLOOD PRESSURE: 71 MMHG | SYSTOLIC BLOOD PRESSURE: 126 MMHG | HEART RATE: 60 BPM

## 2021-05-15 VITALS
DIASTOLIC BLOOD PRESSURE: 86 MMHG | WEIGHT: 253 LBS | BODY MASS INDEX: 38.34 KG/M2 | SYSTOLIC BLOOD PRESSURE: 158 MMHG | HEIGHT: 68 IN | HEART RATE: 62 BPM

## 2021-05-15 VITALS
WEIGHT: 264 LBS | HEIGHT: 68 IN | BODY MASS INDEX: 40.01 KG/M2 | HEART RATE: 78 BPM | DIASTOLIC BLOOD PRESSURE: 78 MMHG | SYSTOLIC BLOOD PRESSURE: 136 MMHG

## 2021-05-15 VITALS
SYSTOLIC BLOOD PRESSURE: 116 MMHG | BODY MASS INDEX: 38.34 KG/M2 | WEIGHT: 253 LBS | HEIGHT: 68 IN | HEART RATE: 58 BPM | DIASTOLIC BLOOD PRESSURE: 70 MMHG

## 2021-05-15 VITALS — HEIGHT: 68 IN | WEIGHT: 252 LBS | BODY MASS INDEX: 38.19 KG/M2

## 2021-05-16 VITALS
BODY MASS INDEX: 37.74 KG/M2 | HEIGHT: 68 IN | SYSTOLIC BLOOD PRESSURE: 160 MMHG | WEIGHT: 249 LBS | DIASTOLIC BLOOD PRESSURE: 94 MMHG | HEART RATE: 60 BPM

## 2021-05-16 VITALS
HEART RATE: 58 BPM | WEIGHT: 244 LBS | SYSTOLIC BLOOD PRESSURE: 132 MMHG | DIASTOLIC BLOOD PRESSURE: 82 MMHG | BODY MASS INDEX: 36.98 KG/M2 | HEIGHT: 68 IN

## 2021-05-16 VITALS
HEART RATE: 70 BPM | HEIGHT: 68 IN | RESPIRATION RATE: 16 BRPM | SYSTOLIC BLOOD PRESSURE: 127 MMHG | OXYGEN SATURATION: 97 % | DIASTOLIC BLOOD PRESSURE: 70 MMHG | BODY MASS INDEX: 37.89 KG/M2 | TEMPERATURE: 98.3 F | WEIGHT: 250 LBS

## 2021-05-16 VITALS
SYSTOLIC BLOOD PRESSURE: 116 MMHG | HEART RATE: 62 BPM | DIASTOLIC BLOOD PRESSURE: 66 MMHG | BODY MASS INDEX: 37.28 KG/M2 | RESPIRATION RATE: 15 BRPM | HEIGHT: 68 IN | OXYGEN SATURATION: 96 % | WEIGHT: 246 LBS | TEMPERATURE: 97.8 F

## 2021-05-28 VITALS
WEIGHT: 264 LBS | HEIGHT: 68 IN | OXYGEN SATURATION: 95 % | BODY MASS INDEX: 40.79 KG/M2 | OXYGEN SATURATION: 94 % | HEIGHT: 68 IN | WEIGHT: 269.12 LBS | SYSTOLIC BLOOD PRESSURE: 117 MMHG | RESPIRATION RATE: 18 BRPM | DIASTOLIC BLOOD PRESSURE: 71 MMHG | HEART RATE: 90 BPM | BODY MASS INDEX: 40.01 KG/M2 | HEART RATE: 57 BPM | SYSTOLIC BLOOD PRESSURE: 131 MMHG | DIASTOLIC BLOOD PRESSURE: 76 MMHG | TEMPERATURE: 97.5 F | RESPIRATION RATE: 16 BRPM | TEMPERATURE: 98.1 F

## 2021-05-28 VITALS
WEIGHT: 267.37 LBS | TEMPERATURE: 96.7 F | BODY MASS INDEX: 40.52 KG/M2 | HEIGHT: 68 IN | OXYGEN SATURATION: 94 % | DIASTOLIC BLOOD PRESSURE: 69 MMHG | HEART RATE: 66 BPM | RESPIRATION RATE: 16 BRPM | SYSTOLIC BLOOD PRESSURE: 118 MMHG

## 2021-05-28 NOTE — PROGRESS NOTES
Patient: ELISHA MITCHELL     Acct: [id number]     Report: #[id number]  UNIT #: [id number]     : 1950    Encounter Date:10/22/2020  PRIMARY CARE: ARVIND MORALES  ***Signed***  --------------------------------------------------------------------------------------------------------------------  Chief Complaint      Encounter Date      Oct 22, 2020            Primary Care Provider            Bennie Morales            Referring Provider      Loer Espitia            Patient Complaint      Patient is complaining of      New pt here for Non cardiac chest pain            VITALS      Height 5 ft 8 in / 172.72 cm      Weight 264 lbs 0 oz / 119.726779 kg      BSA 2.30 m2      BMI 40.1 kg/m2      Temperature 97.5 F / 36.39 C - Temporal      Pulse 90      Respirations 16      Blood Pressure 131/76 Sitting, Left Arm      Pulse Oximetry 95%, Room air      Initial Exhaled Nitrous Oxide      Date:  Oct 22, 2020      Exhaled Nitrous Oxide Results:  42            HPI      The patient is a very pleasant 69 year old female here today to be evaluated for    chest discomfort.             The patient states these symptoms started approximately at the beginning of last    year and have been persistent and ongoing. The patient has been hospitalized and    had extensive cardiac evaluation that was unremarkable. She feels that her pain     is located on both sides of the chest and all throughout and it radiates around     to her back. It is a burning type stinging sensation associated with some chest     tightness and shortness of breath. She has no aggravating factors other than     movement and exertion. She has no relieving factors that she can identify. She     has not been tried on any bronchodilator therapies in the past. She does have     some allergic type symptoms at times. She denies any fever or chills or     hemoptysis. She has smoking history, she smoked for greater than 30 years     intermittently and  she has quit smoking for            ROS      Constitutional:  Denies: Fatigue, Fever, Weight gain, Weight loss, Chills,     Insomnia, Other      Respiratory/Breathing:  Complains of: Shortness of air, Wheezing, Cough; Denies:    Hemoptysis, Pleuritic pain, Other      Endocrine:  Denies: Polydipsia, Polyuria, Heat/cold intolerance, Abnorml     menstrual pattern, Diabetes, Other      Eyes:  Denies: Blurred vision, Vision Changes, Other      Ears, nose, mouth, throat:  Denies: Mouth lesions, Thrush, Throat pain,     Hoarseness, Allergies/Hay Fever, Post Nasal Drip, Headaches, Recent Head Injury,    Nose Bleeding, Neck Stiffness, Thyroid Mass, Hearing Loss, Ear Fullness, Dry     Mouth, Nasal or Sinus Pain, Dry Lips, Nasal discharge, Nasal congestion, Other      Cardiovascular:  Complains of: Chest Pain; Denies: Palpitations, Syncope,     Claudication, Wake up Gasping for air, Leg Swelling, Irregular Heart Rate, Cyan    osis, Dyspnea on Exertion, Other      Gastrointestinal:  Denies: Nausea, Constipation, Diarrhea, Abdominal pain,     Vomiting, Difficulty Swallowing, Reflux/Heartburn, Dysphagia, Jaundice,     Bloating, Melena, Bloody stools, Other      Genitourinary:  Denies: Urinary frequency, Incontinence, Hematuria, Urgency,     Nocturia, Dysuria, Testicular problems, Other      Musculoskeletal:  Denies: Joint Pain, Joint Stiffness, Joint Swelling, Myalgias,    Other      Hematologic/lymphatic:  DENIES: Lymphadenopathy, Bruising, Bleeding tendencies,     Other      Neurological:  Denies: Headache, Numbness, Weakness, Seizures, Other      Psychiatric:  Denies: Anxiety, Appropriate Effect, Depression, Other      Sleep:  No: Excessive daytime sleep, Morning Headache?, Snoring, Insomnia?, Stop    breathing at sleep?, Other      Integumentary:  Denies: Rash, Dry skin, Skin Warm to Touch, Other      Immunologic/Allergic:  Denies: Latex allergy, Seasonal allergies, Asthma,     Urticaria, Eczema, Other      Immunization  status:  No: Up to date            FAMILY/SOCIAL/MEDICAL HX      Surgical History:  Yes: Appendectomy, Bladder Surgery (SLING), Bowel Surgery     (REPAIR OF PROLAPSE, COLONOSCOPY), Cholecystectomy; No: Abdominal Surgery, CABG,    Head Surgery, Oral Surgery, Orthopedic Surgery, Vascular Surgery      Stroke - Family Hx:  Father      Heart - Family Hx:  Father      Diabetes - Family Hx:  Mother      Cancer/Type - Family Hx:  Mother (unk), Sister      Other Family Medical History:  Sister      Is Father Still Living?:  No      Is Mother Still Living?:  No      Social History:  No Tobacco Use, No Alcohol Use, No Recreational Drug use      Smoking status:  Former smoker (smokked total of 35 years 1pack per day. quit     2015)      Anticoagulation Therapy:  No      Antibiotic Prophylaxis:  No      Medical History:  Yes: Anxiety, Hemorrhoids/Rectal Prob (GERD, GASTROPORESIS),     High Blood Pressure (ON MEDS), High Cholesterol; No: Arthritis, Asthma, Blood     Disease, Chemotherapy/Cancer, Chronic Bronchitis/COPD, Congestive Heart Failu,     Deafness or Ringing Ears, Diabetes, Seizures, Heart Attack, Shortness Of Breath,    Miscellaneous Medical/oth      Psychiatric History      Anxiety            PREVENTION      Hx Influenza Vaccination:  Yes      Date Influenza Vaccine Given:  Oct 1, 2020      Influenza Vaccine Declined:  No      2 or More Falls in Past Year?:  No      Fall Past Year with Injury?:  No      Hx Pneumococcal Vaccination:  Yes      Encouraged to follow-up with:  PCP regarding preventative exams.      Chart initiated by      Danielle Love MA            ALLERGIES/MEDICATIONS      Allergies:        Coded Allergies:             ERYTHROMYCIN BASE (Verified  Allergy, Unknown, 10/22/20)           MERCURY (ELEMENTAL) (Verified  Allergy, Unknown, 10/22/20)           METOCLOPRAMIDE (Verified  Allergy, Unknown, hypotension, 10/22/20)           SULFA (SULFONAMIDE ANTIBIOTICS) (Verified  Allergy, Unknown, 10/22/20)            ASPIRIN (Verified  Adverse Reaction, Unknown, 10/22/20)      Medications    Last Reconciled on 10/22/20 09:07 by JOCELYN MARAVILLA MD      Spironolactone (Spironolactone*) 25 Mg Tablet      25 MG PO QDAY, #30 TAB 0 Refills         Reported         10/22/20       Mirtazapine (Mirtazapine*) 7.5 Mg Tablet      7.5 MG PO HS, #30 TAB 0 Refills         Reported         10/22/20       Pantoprazole (Protonix) 40 Mg Tablet.dr      40 MG PO HS, #30 TAB 0 Refills         Reported         10/22/20       Baclofen (BACLOFEN) 10 Mg Tablet      10 MG PO TID, #90 TAB 0 Refills         Reported         10/22/20       Lactobacillus Acidophilus (Florajen) 460 Mg Capsule      460 MG PO QDAY, CAP         Reported         10/22/20       Acetaminophen Es (Tylenol Extra Strength) 500 Mg Tablet      1000 MG PO BID PRN for PAIN OR FEVER, #100 TAB 0 Refills         Reported         1/1/20       Multivitamin/Iron/Folic Acid (CENTRUM COMPLETE MULTIVIT TAB) 1 Tab Tablet      1 TAB PO QDAY, #30 TAB 0 Refills         Reported         1/1/20       Hctz (hydroCHLOROthiazide) 25 Mg Tablet      25 MG PO QDAY         Reported         1/1/20       Gabapentin (Gabapentin) 300 Mg Capsule      600 MG PO HS, #60 CAP 0 Refills         Reported         1/1/20       Citalopram HBr (Citalopram HBr) 20 Mg Tablet      20 MG PO QDAY, #30 TAB 0 Refills         Reported         1/1/20       diphenhydrAMINE HCl (BENADRYL) 25 Mg Capsule      25 MG PO HS PRN for SLEEP, #100 TAB         Reported         10/28/17       Atorvastatin (Atorvastatin) 20 Mg Tablet      20 MG PO AM, #30 TAB 0 Refills         Reported         10/20/17      Current Medications      Current Medications Reviewed 10/22/20            EXAM      Vital Signs Reviewed      WDWN, Alert, NAD.        HEENT:  PERRL, EOMI.  OP, nares clear, no sinus tenderness      Neck:  Supple, no JVD, no thyromegaly      Lymph: no axillary, cervical, supraclavicular lymphadenopathy noted bilaterally      Chest:  good  aeration, clear to auscultation bilaterally, tympanic to percussion    bilaterally, no work of breathing noted      CV: RRR, no MGR, pulses 2+, equal.      Abd:  Soft, NT, ND, + BS, no HSM      EXT:  no clubbing, no cyanosis, no edema, no joint tenderness      Neuro:  A  Skin: No rashes or lesions noted      Vtials      Vitals:             Height 5 ft 8 in / 172.72 cm           Weight 264 lbs 0 oz / 119.104083 kg           BSA 2.30 m2           BMI 40.1 kg/m2           Temperature 97.5 F / 36.39 C - Temporal           Pulse 90           Respirations 16           Blood Pressure 131/76 Sitting, Left Arm           Pulse Oximetry 95%, Room air            REVIEW      Results Reviewed      PCCS Results Reviewed?:  Yes Prev Lab Results, Yes Prev Radiology Results, Yes     Previous [facility] Records            Assessment      Asthma - J45.909            Notes      New Medications      * Lactobacillus Acidophilus (Florajen) 460 MG CAPSULE: 460 MG PO QDAY         Instructions: 1 CAP      * BACLOFEN 10 MG TABLET: 10 MG PO TID #90      * PANTOPRAZOLE (Protonix) 40 MG TABLET.DR: 40 MG PO HS #30         Instructions: Take on an empty stomach.      * Mirtazapine (Mirtazapine*) 7.5 MG TABLET: 7.5 MG PO HS #30      * Spironolactone (Spironolactone*) 25 MG TABLET: 25 MG PO QDAY #30      * Budesonide/Formoterol Fumarate (Symbicort 160/4.5 Mcg) 10.2 GM INH:         2 PUFF INH BID #1      * SPACER (Spacer) 1 EACH EACH: EACH XX ONCE #1         Instructions: Use as directed with inhalers      * Budesonide/Formoterol Fumarate (Symbicort 80/4.5 Mcg) 10.2 GM HFA.AER.AD         Sample - Qty 2         Instructions: 2 puffs BID         Dx: Asthma - J45.909      New Diagnostics      * Immunoglobulin  E (I, Week         Dx: Asthma - J45.909      * Vitamin D Level, Routine         Dx: Asthma - J45.909      * Upper Resp [place] Imm Prof, Month         Dx: Asthma - J45.909      * PFT-Comp, PrePost,DLCO,BodyBox, Week         Dx: Chest pain - R07.9       * [facility] Pre-Op Covid Screening, Routine         Dx: Encounter for screening for other viral diseases - Z11.59      Assessment      Likely asthma      noncardiac chest pains            Plan      Patient with elevated FeNO            We will start patient on Symbicort at this time            Suspect patient likely has asthma            Obtain pulmonary function studies            Obtain vitamin D level as well as IgE            Laboratory data reviewed imaging reviewed            Patient Education      Education resources provided:  Yes            Electronically signed by Chris Reynolds  10/27/2020 17:08       Disclaimer: Converted document may not contain table formatting or lab diagrams. Please see [employer] for the authenticated document.

## 2021-05-28 NOTE — PROGRESS NOTES
Patient: ELISHA MITCHELL     Acct: [id number]     Report: #[id number]  UNIT #: [id number]     : 1950    Encounter Date:2021  PRIMARY CARE: ARVIND MORALES  ***Signed***  --------------------------------------------------------------------------------------------------------------------  Chief Complaint      Encounter Date      2021            Primary Care Provider            Bennie Morales            Referring Provider      Lore Espitia            Patient Complaint      Patient is complaining of      PT here for 3M F/U Chest CT. Emphysema, Asthma            VITALS      Height 5 ft 8.00 in / 172.72 cm      Weight 269 lbs 2.000 oz / 122.654526 kg      BSA 2.32 m2      BMI 40.9 kg/m2      Temperature 98.1 F / 36.72 C - Temporal      Pulse 57      Respirations 18      Blood Pressure 117/71 Sitting, Right Arm      Pulse Oximetry 94%, room air      Initial Exhaled Nitrous Oxide      Date:  Oct 22, 2020      Exhaled Nitrous Oxide Results:  42            HPI      The patient is a pleasant 70 year old female here for follow up.  She is doing     well, breathing has improved, nebulizers did help.  It also was found that she     had intermittent atrial fibrillation that she feels is most likely contributing     to the majority of her shortness of breath.  No increased cough and no increased    sputum at this time. Currently her atrial fibrillation is controlled and she is     under the care of cardiology for this.  She has intermittent episodes of atrial     fibrillation, but is usually in sinus rhythm.            ROS      Constitutional:  Denies: Fatigue, Fever, Weight gain, Weight loss, Chills,     Insomnia, Other      Respiratory/Breathing:  Denies: Shortness of air, Wheezing, Cough, Hemoptysis,     Pleuritic pain, Other      Endocrine:  Denies: Polydipsia, Polyuria, Heat/cold intolerance, Abnorml     menstrual pattern, Diabetes, Other      Eyes:  Denies: Blurred vision, Vision  Changes, Other      Ears, nose, mouth, throat:  Denies: Mouth lesions, Thrush, Throat pain,     Hoarseness, Allergies/Hay Fever, Post Nasal Drip, Headaches, Recent Head Injury,    Nose Bleeding, Neck Stiffness, Thyroid Mass, Hearing Loss, Ear Fullness, Dry     Mouth, Nasal or Sinus Pain, Dry Lips, Nasal discharge, Nasal congestion, Other      Cardiovascular:  Denies: Palpitations, Syncope, Claudication, Chest Pain, Wake     up Gasping for air, Leg Swelling, Irregular Heart Rate, Cyanosis, Dyspnea on     Exertion, Other      Gastrointestinal:  Denies: Nausea, Constipation, Diarrhea, Abdominal pain,     Vomiting, Difficulty Swallowing, Reflux/Heartburn, Dysphagia, Jaundice,     Bloating, Melena, Bloody stools, Other      Genitourinary:  Denies: Urinary frequency, Incontinence, Hematuria, Urgency,     Nocturia, Dysuria, Testicular problems, Other      Musculoskeletal:  Denies: Joint Pain, Joint Stiffness, Joint Swelling, Myalgias,    Other      Hematologic/lymphatic:  DENIES: Lymphadenopathy, Bruising, Bleeding tendencies,     Other      Neurological:  Denies: Headache, Numbness, Weakness, Seizures, Other      Psychiatric:  Denies: Anxiety, Appropriate Effect, Depression, Other      Sleep:  No: Excessive daytime sleep, Morning Headache?, Snoring, Insomnia?, Stop    breathing at sleep?, Other      Integumentary:  Denies: Rash, Dry skin, Skin Warm to Touch, Other      Immunologic/Allergic:  Denies: Latex allergy, Seasonal allergies, Asthma,     Urticaria, Eczema, Other      Immunization status:  No: Up to date            FAMILY/SOCIAL/MEDICAL HX      Surgical History:  Yes: Appendectomy, Bladder Surgery (SLING), Bowel Surgery (    REPAIR OF PROLAPSE, COLONOSCOPY), Cholecystectomy; No: Abdominal Surgery, CABG,     Head Surgery, Oral Surgery, Orthopedic Surgery, Vascular Surgery      Stroke - Family Hx:  Father      Heart - Family Hx:  Father      Diabetes - Family Hx:  Mother      Cancer/Type - Family Hx:  Mother (unk),  Sister      Other Family Medical History:  Sister      Is Father Still Living?:  No      Is Mother Still Living?:  No      Social History:  No Tobacco Use, No Alcohol Use, No Recreational Drug use      Smoking status:  Former smoker (smokked total of 35 years 1pack per day. quit     2015)      Anticoagulation Therapy:  No      Antibiotic Prophylaxis:  No      Medical History:  Yes: Anxiety, Hemorrhoids/Rectal Prob (GERD, GASTROPORESIS),     High Blood Pressure (ON MEDS), High Cholesterol; No: Arthritis, Asthma, Blood     Disease, Chemotherapy/Cancer, Chronic Bronchitis/COPD, Congestive Heart Failu,     Deafness or Ringing Ears, Diabetes, Seizures, Heart Attack, Shortness Of Breath,    Sinus Trouble, Miscellaneous Medical/oth      Psychiatric History      Anxiety            PREVENTION      Hx Influenza Vaccination:  Yes      Date Influenza Vaccine Given:  Oct 1, 2020      Influenza Vaccine Declined:  No      2 or More Falls in Past Year?:  No      Fall Past Year with Injury?:  No      Hx Pneumococcal Vaccination:  Yes      Encouraged to follow-up with:  PCP regarding preventative exams.      Chart initiated by      Jennifer Cole MA            ALLERGIES/MEDICATIONS      Allergies:        Coded Allergies:             ERYTHROMYCIN BASE (Verified  Allergy, Unknown, 4/8/21)           MERCURY (ELEMENTAL) (Verified  Allergy, Unknown, 4/8/21)           METOCLOPRAMIDE (Verified  Allergy, Unknown, hypotension, 4/8/21)           SULFA (SULFONAMIDE ANTIBIOTICS) (Verified  Allergy, Unknown, 4/8/21)           ASPIRIN (Verified  Adverse Reaction, Unknown, 4/8/21)      Medications    Last Reconciled on 4/8/21 10:10 by JOCELYN MARAVILLA MD      Metoprolol Succinate (Metoprolol Succinate) 25 Mg Tab.er.24h      25 MG PO BID, #60 TAB 0 Refills         Reported         4/8/21       Apixaban (Eliquis) 5 Mg Tablet      5 MG PO BID for 30 Days, #60 TAB         Reported         4/8/21       Budesonide/Formoterol Fumarate (Symbicort 160/4.5  Mcg) 10.2 Gm Inh      2 PUFF INH BID, #1 INH 4 Refills         Prov: Chris Reynolds         3/23/21       Revefenacin (YUPELRI) 175 Mcg/3 Ml Nebu      175 MCG INH RTQDAY for 30 Days, #30 NEB 5 Refills         Prov: LEWIS ARCE         1/25/21       Formoterol Fumarate (Perforomist) 20 Mcg/2 Ml Vial.neb      20 MCG INH BID for 30 Days, #120 ML 5 Refills         Prov: LEWIS ARCE         1/25/21       NEB-Albuterol Sulf (Albuterol) 2.5 Mg/3 Ml Vial.neb      2.5 MG INH Q4-6H PRN for SHORTNESS OF BREATH for 30 Days, #120 NEB 5 Refills         Prov: LEWIS ARCE         1/25/21       Cholecalciferol (Vitamin D3) (Vitamin D3) 1,000 Unit Tablet      2000 UNITS PO QDAY for 30 Days, #60 TAB         Prov: Chris Reynolds         1/11/21       Spacer (Spacer) 1 Each Each      EACH XX ONCE, #1 0 Refills         Prov: Chris Reynolds         10/26/20       Spironolactone (Spironolactone*) 25 Mg Tablet      25 MG PO QDAY, #30 TAB 0 Refills         Reported         10/22/20       Mirtazapine (Mirtazapine*) 7.5 Mg Tablet      7.5 MG PO HS, #30 TAB 0 Refills         Reported         10/22/20       Pantoprazole (Protonix) 40 Mg Tablet.dr      40 MG PO HS, #30 TAB 0 Refills         Reported         10/22/20       Baclofen (BACLOFEN) 10 Mg Tablet      10 MG PO TID, #90 TAB 0 Refills         Reported         10/22/20       Lactobacillus Acidophilus (Florajen) 460 Mg Capsule      460 MG PO QDAY, CAP         Reported         10/22/20       Acetaminophen Es (Tylenol Extra Strength) 500 Mg Tablet      1000 MG PO BID PRN for PAIN OR FEVER, #100 TAB 0 Refills         Reported         1/1/20       Multivitamin/Iron/Folic Acid (CENTRUM COMPLETE MULTIVIT TAB) 1 Tab Tablet      1 TAB PO QDAY, #30 TAB 0 Refills         Reported         1/1/20       Hctz (hydroCHLOROthiazide) 25 Mg Tablet      25 MG PO QDAY         Reported         1/1/20       Gabapentin (Gabapentin) 300 Mg Capsule      600 MG PO HS, #60 CAP 0 Refills         Reported          1/1/20       Citalopram HBr (Citalopram HBr) 20 Mg Tablet      20 MG PO QDAY, #30 TAB 0 Refills         Reported         1/1/20       diphenhydrAMINE HCl (BENADRYL) 25 Mg Capsule      25 MG PO HS PRN for SLEEP, #100 TAB         Reported         10/28/17       Atorvastatin (Atorvastatin) 20 Mg Tablet      20 MG PO AM, #30 TAB 0 Refills         Reported         10/20/17      Current Medications      Current Medications Reviewed 4/8/21            EXAM      Vital Signs Reviewed      WDWN, Alert, NAD.        HEENT:  PERRL, EOMI.  OP, nares clear, no sinus tenderness      Neck:  Supple, no JVD, no thyromegaly      Lymph: no axillary, cervical, supraclavicular lymphadenopathy noted bilaterally      Chest:  good aeration, clear to auscultation bilaterally, tympanic to percussion    bilaterally, no work of breathing noted      CV: RRR, no MGR, pulses 2+, equal.      Abd:  Soft, NT, ND, + BS, no HSM      EXT:  no clubbing, no cyanosis, no edema, no joint tenderness      Neuro:  A  Skin: No rashes or lesions noted      Vtials      Vitals:             Height 5 ft 8.00 in / 172.72 cm           Weight 269 lbs 2.000 oz / 122.761461 kg           BSA 2.32 m2           BMI 40.9 kg/m2           Temperature 98.1 F / 36.72 C - Temporal           Pulse 57           Respirations 18           Blood Pressure 117/71 Sitting, Right Arm           Pulse Oximetry 94%, room air            REVIEW      Results Reviewed      [facility] Results Reviewed?:  Yes Prev Lab Results, Yes Prev Radiology Results, Yes     Previous [facility] Records            Assessment      Notes      New Medications      * Apixaban (Eliquis) 5 MG TABLET: 5 MG PO BID 30 Days #60      * Metoprolol Succinate 25 MG TAB.ER.24H: 25 MG PO BID #60      * Neb-Budesonide (Pulmicort) 0.5 MG/2 ML AMPUL.NEB: 0.5 MG INH Q12HR #60         Instructions: Submit to Medicare B if applicable. Call for Diagnosis if        needed.      ASSESSMENT:       1. Dyspnea.      2. Emphysema seen on CT  scan.      3. Atrial fibrillation.            PLAN:      1.  At this time, given the patient's elevated FENO, we will place her on     Pulmicort and Perforomist, discontinue Yupelri.  Discontinue albuterol.        2. We will see patient back in a few months to see if we cannot wean her off her    medications. I suspect that some of her shortness of breath could be because of     her atrial fibrillation.            Patient Education      Education resources provided:  Yes      Patient Education Provided:  COPD            Electronically signed by Chris Reynolds  04/26/2021 21:03       Disclaimer: Converted document may not contain table formatting or lab diagrams. Please see [facility] for the authenticated document.

## 2021-05-28 NOTE — PROGRESS NOTES
Patient: ELISHA MITCHELL     Acct: [id number]     Report: #[id number]  UNIT #: [id number]     : 1950    Encounter Date:2021  PRIMARY CARE: ARVIND MORALES  ***Signed***  --------------------------------------------------------------------------------------------------------------------  Chief Complaint      Encounter Date      2021            Primary Care Provider            Bennie Morales            Referring Provider      Lore Espitia            Patient Complaint      Patient is complaining of      Pt here for f/u, lab results and PFT results, Asthma            VITALS      Height 5 ft 8 in / 172.72 cm      Weight 267 lbs 6 oz / 121.294450 kg      BSA 2.31 m2      BMI 40.7 kg/m2      Temperature 96.7 F / 35.94 C - Temporal      Pulse 66      Respirations 16      Blood Pressure 118/69 Sitting, Right Arm      Pulse Oximetry 94%, Room air      Comment: 45 DPI      Initial Exhaled Nitrous Oxide      Date:  Oct 22, 2020      Exhaled Nitrous Oxide Results:  42            HPI      The patient's previous medical records and the the previous pulmonary evaluation    and lab data are reviewed.  At present patient is on Symbicort which helped her     in the beginning but not much.  When the patient had the pulmonary function     testing done and when they give the short acting inhaler she felt much better     but the test did not show any significant improvement with bronchodilators      This patient has been having the shortness of breath and chest discomfort.  2     years ago patient was in the hospital x2 and an extensive evaluation including     the cardiac cath and other and was told that she has only 25% block and     completely rule out any heart problems.  Subsequently patient was seen by a     physician and worked up and also had an EGD and who recommended the patient to     be seen by the pulmonary service.  Patient was seen by Dr. Reynolds and he     reviewed the patient and  did the ER and were testing which is mildly elevated     and based on her history it was suspected that the patient has asthma and     started on Symbicort and worked up.  Patient also had any allergy testing done     as well.  Next patient used to work as an  and denies of any     exposure to any environmental factors.  She also had a sleep study and was told     that she did not have any obstructive sleep apnea.  She used to smoke in the     past and quit smoking 6 years ago and used to smoke about 40 years.      Patient's clinical history as noted and I also reviewed the review of the [facility] family history and preventive measures etc. and as noted below.      Patient is using the facemask for COVID-19 precautions.            ROS      Constitutional:  Denies: Fatigue, Fever, Weight gain, Weight loss, Chills,     Insomnia, Other      Respiratory/Breathing:  Complains of: Shortness of air, Wheezing, Cough; Denies:    Hemoptysis, Pleuritic pain, Other      Endocrine:  Denies: Polydipsia, Polyuria, Heat/cold intolerance, Abnorml     menstrual pattern, Diabetes, Other      Eyes:  Denies: Blurred vision, Vision Changes, Other      Ears, nose, mouth, throat:  Denies: Mouth lesions, Thrush, Throat pain,     Hoarseness, Allergies/Hay Fever, Post Nasal Drip, Headaches, Recent Head Injury,    Nose Bleeding, Neck Stiffness, Thyroid Mass, Hearing Loss, Ear Fullness, Dry     Mouth, Nasal or Sinus Pain, Dry Lips, Nasal discharge, Nasal congestion, Other      Cardiovascular:  Denies: Palpitations, Syncope, Claudication, Chest Pain, Wake u    p Gasping for air, Leg Swelling, Irregular Heart Rate, Cyanosis, Dyspnea on     Exertion, Other      Gastrointestinal:  Denies: Nausea, Constipation, Diarrhea, Abdominal pain,     Vomiting, Difficulty Swallowing, Reflux/Heartburn, Dysphagia, Jaundice,     Bloating, Melena, Bloody stools, Other      Genitourinary:  Denies: Urinary frequency, Incontinence, Hematuria, Urgency,      Nocturia, Dysuria, Testicular problems, Other      Musculoskeletal:  Denies: Joint Pain, Joint Stiffness, Joint Swelling, Myalgias,    Other      Hematologic/lymphatic:  DENIES: Lymphadenopathy, Bruising, Bleeding tendencies,     Other      Neurological:  Denies: Headache, Numbness, Weakness, Seizures, Other      Psychiatric:  Denies: Anxiety, Appropriate Effect, Depression, Other      Sleep:  No: Excessive daytime sleep, Morning Headache?, Snoring, Insomnia?, Stop    breathing at sleep?, Other      Integumentary:  Denies: Rash, Dry skin, Skin Warm to Touch, Other      Immunologic/Allergic:  Denies: Latex allergy, Seasonal allergies, Asthma, Urt    icaria, Eczema, Other      Immunization status:  No: Up to date            FAMILY/SOCIAL/MEDICAL HX      Surgical History:  Yes: Appendectomy, Bladder Surgery (SLING), Bowel Surgery     (REPAIR OF PROLAPSE, COLONOSCOPY), Cholecystectomy; No: Abdominal Surgery, CABG,    Head Surgery, Oral Surgery, Orthopedic Surgery, Vascular Surgery      Stroke - Family Hx:  Father      Heart - Family Hx:  Father      Diabetes - Family Hx:  Mother      Cancer/Type - Family Hx:  Mother (unk), Sister      Other Family Medical History:  Sister      Is Father Still Living?:  No      Is Mother Still Living?:  No      Social History:  No Tobacco Use, No Alcohol Use, No Recreational Drug use      Smoking status:  Former smoker (smokked total of 35 years 1pack per day. quit     2015)      Anticoagulation Therapy:  No      Antibiotic Prophylaxis:  No      Medical History:  Yes: Anxiety, Hemorrhoids/Rectal Prob (GERD, GASTROPORESIS),     High Blood Pressure (ON MEDS), High Cholesterol; No: Arthritis, Asthma, Blood     Disease, Chemotherapy/Cancer, Chronic Bronchitis/COPD, Congestive Heart Failu,     Deafness or Ringing Ears, Diabetes, Seizures, Heart Attack, Shortness Of Breath,    Sinus Trouble, Miscellaneous Medical/oth      Psychiatric History      Anxiety            PREVENTION      Hx  Influenza Vaccination:  Yes      Date Influenza Vaccine Given:  Oct 1, 2020      Influenza Vaccine Declined:  No      2 or More Falls in Past Year?:  No      Fall Past Year with Injury?:  No      Hx Pneumococcal Vaccination:  Yes      Encouraged to follow-up with:  PCP regarding preventative exams.      Chart initiated by      Danielle Etienne MA            ALLERGIES/MEDICATIONS      Allergies:        Coded Allergies:             ERYTHROMYCIN BASE (Verified  Allergy, Unknown, 1/25/21)           MERCURY (ELEMENTAL) (Verified  Allergy, Unknown, 1/25/21)           METOCLOPRAMIDE (Verified  Allergy, Unknown, hypotension, 1/25/21)           SULFA (SULFONAMIDE ANTIBIOTICS) (Verified  Allergy, Unknown, 1/25/21)           ASPIRIN (Verified  Adverse Reaction, Unknown, 1/25/21)      Medications    Last Reconciled on 1/25/21 11:56 by LEWIS ARCE      Cholecalciferol (Vitamin D3) (Vitamin D3) 1,000 Unit Tablet      2000 UNITS PO QDAY for 30 Days, #60 TAB         Prov: Chris Reynolds         1/11/21       Spacer (Spacer) 1 Each Each      EACH XX ONCE, #1 0 Refills         Prov: Chris Reynolds         10/26/20       Budesonide/Formoterol Fumarate (Symbicort 160/4.5 Mcg) 10.2 Gm Inh      2 PUFF INH BID, #1 INH 4 Refills         Prov: Chris Reynolds         10/22/20       Spironolactone (Spironolactone*) 25 Mg Tablet      25 MG PO QDAY, #30 TAB 0 Refills         Reported         10/22/20       Mirtazapine (Mirtazapine*) 7.5 Mg Tablet      7.5 MG PO HS, #30 TAB 0 Refills         Reported         10/22/20       Pantoprazole (Protonix) 40 Mg Tablet.dr      40 MG PO HS, #30 TAB 0 Refills         Reported         10/22/20       Baclofen (BACLOFEN) 10 Mg Tablet      10 MG PO TID, #90 TAB 0 Refills         Reported         10/22/20       Lactobacillus Acidophilus (Florajen) 460 Mg Capsule      460 MG PO QDAY, CAP         Reported         10/22/20       Acetaminophen Es (Tylenol Extra Strength) 500 Mg Tablet      1000 MG PO BID PRN for  PAIN OR FEVER, #100 TAB 0 Refills         Reported         1/1/20       Multivitamin/Iron/Folic Acid (CENTRUM COMPLETE MULTIVIT TAB) 1 Tab Tablet      1 TAB PO QDAY, #30 TAB 0 Refills         Reported         1/1/20       Hctz (hydroCHLOROthiazide) 25 Mg Tablet      25 MG PO QDAY         Reported         1/1/20       Gabapentin (Gabapentin) 300 Mg Capsule      600 MG PO HS, #60 CAP 0 Refills         Reported         1/1/20       Citalopram HBr (Citalopram HBr) 20 Mg Tablet      20 MG PO QDAY, #30 TAB 0 Refills         Reported         1/1/20       diphenhydrAMINE HCl (BENADRYL) 25 Mg Capsule      25 MG PO HS PRN for SLEEP, #100 TAB         Reported         10/28/17       Atorvastatin (Atorvastatin) 20 Mg Tablet      20 MG PO AM, #30 TAB 0 Refills         Reported         10/20/17      Current Medications      Current Medications Reviewed 1/25/21            EXAM      Vtials      Vitals:             Height 5 ft 8 in / 172.72 cm           Weight 267 lbs 6 oz / 121.544407 kg           BSA 2.31 m2           BMI 40.7 kg/m2           Temperature 96.7 F / 35.94 C - Temporal           Pulse 66           Respirations 16           Blood Pressure 118/69 Sitting, Right Arm           Pulse Oximetry 94%, Room air           Comment: 45 DPI            Constitutional      General appearance:  Comfortable            Neck      Enlarged nodes:  Bilateral: None            Respiratory      Work of breathing:  Normal      Auscultation:  Bilateral: Normal            Cardiovascular      Rhythm:  regular      Heart sounds:  NORMAL: S1, S2            Gastrointestinal      Abdomen description:  Nontender      Hepatosplenomegaly:  none      Mass:  None            Skin      General color:  Normal            Assessment      Chronic shortness of breath - R06.02            Chest pain         Other chest pain         Chest pain type: other chest pain            Gastroesophageal reflux disease         Gastroesophageal reflux disease without  esophagitis         Esophagitis presence: without esophagitis            DJD (degenerative joint disease) of cervical spine         Osteoarthritis of cervical spine, unspecified spinal osteoarthritis        complication status         Spinal osteoarthritis complication: unspecified spinal osteoarthritis            DJD (degenerative joint disease) of thoracic spine         Osteoarthritis of thoracic spine, unspecified spinal osteoarthritis        complication status         Spinal osteoarthritis complication: unspecified spinal osteoarthritis            Former smoker - Z87.891            Obesity - E66.9         Obesity classification: adult class 3 (BMI >= 40)         Body mass index: BMI 40.0-44.9            Notes      Patient is clinical history and the previous doctor's evaluations are reviewed.     Patient problem appeared to be chronic.  Patient had the pulmonary function     testing which showed mild decrease diffusion otherwise unremarkable and did not     show much improvement with the bronchodilators.  And she is not getting help by     the Symbicort.  Patient has the IgE and also the environmental allergy testing     which were all nondiagnostic.  Alpha-1 antitrypsin level is MM.      Patient has an extensive evaluation with GI and cardiology and could not explain    her present symptoms.      It was noted that patient has significant nerve pinching and DJD of her cervical    and the thoracic spine also get the chest discomfort and the tingling numbness     part of her symptoms could be because of this.  Patient also obese and she     gained weight.      In check dial was done and the patient is not doing very well with the     Symbicort.      I reviewed and explained to her about the testing and the results which are     nondiagnostic at this point.  However in view of the persistency of the symptoms    and previous history of the smoking and clinically patient improved with the     postbronchodilator  during the pulmonary function testing, we will continue to     treat the patient as possible chronic bronchitis and asthma and recommend the     patient to stop the Symbicort and give her the Perforomist and Yupelri along     with the albuterol nebulizer as needed in between.      In view of the chronic chest pain and also shortness of breath which is not     getting any better, will proceed with the CT scan of the chest with contrast and    also will get the methacholine challenge test if possible.  If these were     negative and if the patient is still continues to be symptomatic, she may get     benefit from cardiopulmonary testing.  And also discussed about the importance     of losing weight.  Other medical problems are being followed by primary team     physician and other consultants.      New Medications      * NEB-Albuterol Sulf (Albuterol) 2.5 MG/3 ML VIAL.NEB: 2.5 MG INH Q4-6H PRN       SHORTNESS OF BREATH 30 Days #120         Instructions: Submit to Medicare B if applicable. Call for Diagnosis if        needed.      * Formoterol Fumarate (Perforomist) 20 MCG/2 ML VIAL.NEB: 20 MCG INH BID 30 Days      #120      * REVEFENACIN (YUPELRI) 175 MCG/3 ML NEBU: 175 MCG INH RTQDAY 30 Days #30      New Diagnostics      * Chest W/ Cont CT, SCHEDULED PROCEDURE         Dx: Chest pain - R07.9      Assessment      Likely asthma      noncardiac chest pains            Plan      Patient with elevated FeNO            We will start patient on Symbicort at this time            Suspect patient likely has asthma            Obtain pulmonary function studies            Obtain vitamin D level as well as IgE            Laboratory data reviewed imaging reviewed            Electronically signed by LEWIS ARCE  01/25/2021 11:56       Disclaimer: Converted document may not contain table formatting or lab diagrams. Please see [employer] for the authenticated document.

## 2021-06-03 RX ORDER — MULTIPLE VITAMINS W/ MINERALS TAB 9MG-400MCG
1 TAB ORAL DAILY
COMMUNITY
End: 2021-08-12

## 2021-06-03 RX ORDER — BACLOFEN 10 MG/1
10 TABLET ORAL 3 TIMES DAILY
COMMUNITY

## 2021-06-03 RX ORDER — GABAPENTIN 300 MG/1
300 CAPSULE ORAL DAILY
COMMUNITY

## 2021-06-03 RX ORDER — HYDROCHLOROTHIAZIDE 25 MG/1
25 TABLET ORAL DAILY
COMMUNITY

## 2021-06-03 RX ORDER — MIRTAZAPINE 15 MG/1
15 TABLET, FILM COATED ORAL NIGHTLY
COMMUNITY

## 2021-06-03 RX ORDER — ACETAMINOPHEN 500 MG
500 TABLET ORAL
COMMUNITY
End: 2022-12-05 | Stop reason: SDUPTHER

## 2021-06-03 RX ORDER — ATORVASTATIN CALCIUM 80 MG/1
80 TABLET, FILM COATED ORAL DAILY
COMMUNITY

## 2021-06-03 RX ORDER — SPIRONOLACTONE 25 MG/1
25 TABLET ORAL DAILY
COMMUNITY
End: 2021-08-12

## 2021-06-03 RX ORDER — CITALOPRAM 20 MG/1
20 TABLET ORAL DAILY
COMMUNITY

## 2021-06-03 RX ORDER — BUDESONIDE 0.5 MG/2ML
0.5 INHALANT ORAL
COMMUNITY
End: 2021-08-12

## 2021-06-03 RX ORDER — DIPHENHYDRAMINE HCL 25 MG
25 TABLET ORAL EVERY 6 HOURS PRN
COMMUNITY

## 2021-06-03 RX ORDER — PANTOPRAZOLE SODIUM 40 MG/1
40 TABLET, DELAYED RELEASE ORAL DAILY
COMMUNITY
End: 2021-08-17

## 2021-06-05 NOTE — PROGRESS NOTES
"   Progress Note      Patient Name: Nica Traylor   Patient ID: [id number]   Sex: Female   YOB: 1950    Primary Care Provider: Wilder Morales   Referring Provider: Matthias Palmer    Visit Date: May 6, 2021    Provider: Chris Manzo MD   Location: [facility]   Location Address: [address] [place] [address], [facility]   [place], [place]  [id number]   Location Phone: [phone]          Chief Complaint  · Shortness of breath   · Fatigue      History Of Present Illness  REFERRING CARE PROVIDER: Matthias Palmer   Nica Traylor is a 70-year-old female with paroxysmal atrial fibrillation, underlying asthma/COPD, minimal CAD, and hypertension who has had no change in her symptoms since last visit and the addition of Aldactone therapy. She still reports a constant dull ache in her heart radiating to her back with no aggravating or alleviating factors. She has generalized fatigue with symptoms of lightheadedness. She reports persistent shortness of breath especially with exertional symptoms. No weight gain or increased edema issues.   PAST MEDICAL HISTORY: Chronic renal failure; Coronary artery disease; Dyslipidemia; GERD; Hypertension; Asthma.   PSYCHOSOCIAL HISTORY: Denies alcohol use. Previously smoked, but quit.   CURRENT MEDICATIONS: Medication list was reviewed and is as documented.      ALLERGIES:  M[place]; penicillin; Reglan.       Review of Systems  · Cardiovascular  o Admits  o : shortness of breath while walking or lying flat  o Denies  o : palpitations (fast, fluttering, or skipping beats), swelling (feet, ankles, hands), chest pain or angina pectoris   · Respiratory  o Denies  o : chronic or frequent cough      Vitals  Date Time BP Position Site L\R Cuff Size HR RR TEMP (F) WT  HT  BMI kg/m2 BSA m2 O2 Sat FR L/min FiO2        05/06/2021 10:58 /76 Sitting    68 - R   267lbs 0oz 5'  8\" 40.6 2.41             Physical Examination  · Constitutional  o Appearance  o : Awake, alert, in no " acute distress.   · Eyes  o Conjunctivae  o : Normal.  · Ears, Nose, Mouth and Throat  o Oral Cavity  o :   § Oral Mucosa  § : Normal.  · Neck  o Inspection/Palpation  o : No JVD. Good carotid upstroke. No thyromegaly.  · Respiratory  o Respiratory  o : Good respiratory effort. Clear to percussion and auscultation.  · Cardiovascular  o Heart  o :   § Auscultation of Heart  § : S1, S2 normal. Regular rate and rhythm without murmurs, gallops, or rubs.  o Peripheral Vascular System  o :   § Extremities  § : Good femoral and pedal pulses. Trace bilateral lower extremity edema.   · Gastrointestinal  o Abdominal Examination  o : Soft. No tenderness or masses felt. No hepatosplenomegaly. Abdominal aorta is not palpable.  · Labs  o Labs  o : Potassium 4.2, creatinine 1.37.   · Echocardiogram  o Echocardiogram  o : Echocardiogram was technically difficult but showed grossly normal EF of 55% with mild left ventricular hypertrophy and borderline left atrial enlargement.     She had a 24-hour Holter monitor which showed some paroxysmal atrial fibrillation about 2% of the time.           Assessment     ASSESSMENT AND PLAN:  1.  Dyspnea on exertion. Patient with persistent intermittent  episodes of shortness of breath that seemed to have kicked off and worsened since she developed atrial fibrillation on a monitor. She is having paroxysmal episodes but  this may be contributing to some of her underlying symptoms. She does suffer from a degree of diastolic CHF. Recommended a trial of rhythm maintenance with Flecainide. Will hold on Lasix at this point with the addition of Aldactone therapy. Her weight appears stable. Encouraged patient to keep a home log. If more than 5 pounds in a week, then would  resume as needed dosing of Lasix.    2.  Paroxysmal atrial fibrillation, some recurrent episodes with some RVR. Recommend addition of Flecainide 50 mg  b.i.d.  Will repeat EKG in three days.    3.  Diastolic congestive heart failure.  Stable currently. Suspect that the paroxysmal atrial fibrillation may be exacerbating underlying symptoms.       Chris Manzo MD  JH/pap             Electronically Signed by: Kerry Matthews-, Other -Author on May 7, 2021 02:11:29 PM  Electronically Co-signed by: Chris Manzo MD -Reviewer on May 10, 2021 11:04:16 AM

## 2021-06-05 NOTE — PROCEDURES
"   Procedure Note      Patient Name: Nica Traylor   Patient ID: [id number]   Sex: Female   YOB: 1950    Primary Care Provider: Wilder Morales   Referring Provider: Matthias Palmer    Visit Date: May 4, 2021    Provider: James Espinal MD   Location: [facility]   Location Address: [address] [place] [address], [address]  [id number]   Location Phone: [phone]          FINAL REPORT   TRANSTHORACIC ECHOCARDIOGRAM REPORT    Diagnosis: Atrial Fibrillation   Height: 5'8\" Weight: 268 B/P: 134/78 BSA: 2.3   Tech: BNS   MEASUREMENTS:  RVID (Diastole) : RVID. (NORMAL: 0.7 to 2.4 cm max)   LVID (Systole): 3.6 cm (Diastole): 4.9 cm. (NORMAL: 3.7 - 5.4 cm)   Posterior Wall Thickness (Diastole): 1.3 cm. (NORMAL: 0.8 - 1.1 cm)   Septal Thickness (Diastole): 1.2 cm. (NORMAL: 0.7 - 1.2 cm)   LAID (Systole): 3.9 cm. (NORMAL: 1.9 - 3.8 cm)   Aortic Root Diameter (Diastole): 3.1 cm. (NORMAL: 2.0 - 3.7 cm)   DOPPLER: Continuous-wave, pulse-wave, and color-flow examination of the mitral, aortic, and tricuspid valves was performed. No significant stenosis or regurgitation was identified. Doppler flow velocities were normal. E/A ratio is 0.8. DT= 176 msec. IVRT is 85 msec. E/E' is 10.   COMMENTS:  The patient underwent 2-D, M-Mode, and Doppler examination, including pulse-wave, continuous-wave, and color-flow analysis; the study is technically difficult.   FINDINGS:  AORTIC VALVE: Appeared to be normal. Trileaflet with central closure point. No evidence of any obstruction. No regurgitation.   MITRAL VALVE: Appeared to be normal. No evidence of any obstruction. No regurgitation.   TRICUSPID VALVE: Appeared to be normal.   PULMONIC VALVE: Not well seen.   LEFT ATRIUM: Borderline enlarged. LA volume index is 19 mL/m2.   AORTIC ROOT: Appeared to be normal in size.   LEFT VENTRICLE: Grossly normal with EF of 55% with mild left ventricular hypertrophy. No focal wall motion abnormalities or cavitary " enlargement.   RIGHT ATRIUM: Appeared to be normal; no obvious evidence of intracavity masses or clots.   RIGHT VENTRICLE: Normal size and function.   PERICARDIUM: Unremarkable. No evidence of effusion.   INFERIOR VENA CAVA: Diameter is 1.4 cm.   Fax: 05/07/2021      CONCLUSION:  1.  Technically difficult images.   2.  Grossly normal EF of 55%.  3.  Mild left ventricular hypertrophy.  4.  Borderline left atrial enlargement.      Chris Manzo MD  /pap                 Electronically Signed by: Kerry Matthews-, Other -Author on May 7, 2021 01:55:47 PM  Electronically Co-signed by: Chris Manzo MD -Reviewer on May 10, 2021 12:15:51 PM

## 2021-06-18 ENCOUNTER — TELEPHONE (OUTPATIENT)
Dept: CARDIOLOGY | Facility: CLINIC | Age: 71
End: 2021-06-18

## 2021-06-18 DIAGNOSIS — R06.02 SHORTNESS OF BREATH: ICD-10-CM

## 2021-06-18 DIAGNOSIS — I50.9 CONGESTIVE HEART FAILURE, UNSPECIFIED HF CHRONICITY, UNSPECIFIED HEART FAILURE TYPE (HCC): Primary | ICD-10-CM

## 2021-06-18 NOTE — TELEPHONE ENCOUNTER
"Received call from patient.     She c/o fatigue, SOA with exertion and lightheadedness.    Reports she has been taking her PRN Lasix and Potassium d/t weight gain.  States she has been gaining 5 lb over night.  I verified this was in one night and that she is weighing in the morning.  Denies edema.    Patient does not monitor BP/HR daily.  During call patient took her BP which was 154/85.  Advised this was likely not accurate because she was just up walking around.  Explained for more accurate readings patient obtain BP/HR after she has sat for at least 5-10 minutes.    I advised see had reported these same symptoms last month at office visit.  She stated these have not worsened.  But did state a few days ago while walking out to her car she became lightheaded and had \"fuzzy\" vision.  This resolved after she stopped and rested.      She has an appt 6/24/2021 but wanted a provider to review before appt.   "

## 2021-06-18 NOTE — TELEPHONE ENCOUNTER
Called and informed patient of lab and CXR orders. Patient verbalized understanding and states she will go Monday 6/21.

## 2021-06-21 ENCOUNTER — LAB (OUTPATIENT)
Dept: LAB | Facility: HOSPITAL | Age: 71
End: 2021-06-21

## 2021-06-21 ENCOUNTER — HOSPITAL ENCOUNTER (OUTPATIENT)
Dept: GENERAL RADIOLOGY | Facility: HOSPITAL | Age: 71
Discharge: HOME | End: 2021-06-21

## 2021-06-21 DIAGNOSIS — I50.9 CONGESTIVE HEART FAILURE, UNSPECIFIED HF CHRONICITY, UNSPECIFIED HEART FAILURE TYPE (HCC): ICD-10-CM

## 2021-06-21 DIAGNOSIS — R06.02 SHORTNESS OF BREATH: ICD-10-CM

## 2021-06-21 PROCEDURE — 71046 X-RAY EXAM CHEST 2 VIEWS: CPT

## 2021-06-21 PROCEDURE — 83880 ASSAY OF NATRIURETIC PEPTIDE: CPT

## 2021-06-21 PROCEDURE — 80048 BASIC METABOLIC PNL TOTAL CA: CPT

## 2021-06-21 PROCEDURE — 36415 COLL VENOUS BLD VENIPUNCTURE: CPT

## 2021-06-22 ENCOUNTER — TELEPHONE (OUTPATIENT)
Dept: CARDIOLOGY | Facility: CLINIC | Age: 71
End: 2021-06-22

## 2021-06-22 LAB
ANION GAP SERPL CALCULATED.3IONS-SCNC: 12.4 MMOL/L (ref 5–15)
BUN SERPL-MCNC: 27 MG/DL (ref 8–23)
BUN/CREAT SERPL: 14.4 (ref 7–25)
CALCIUM SPEC-SCNC: 9.5 MG/DL (ref 8.6–10.5)
CHLORIDE SERPL-SCNC: 96 MMOL/L (ref 98–107)
CO2 SERPL-SCNC: 30.6 MMOL/L (ref 22–29)
CREAT SERPL-MCNC: 1.87 MG/DL (ref 0.57–1)
GFR SERPL CREATININE-BSD FRML MDRD: 27 ML/MIN/1.73
GLUCOSE SERPL-MCNC: 97 MG/DL (ref 65–99)
NT-PROBNP SERPL-MCNC: 32 PG/ML (ref 0–900)
POTASSIUM SERPL-SCNC: 4.1 MMOL/L (ref 3.5–5.2)
SODIUM SERPL-SCNC: 139 MMOL/L (ref 136–145)

## 2021-06-22 NOTE — TELEPHONE ENCOUNTER
----- Message from PRAVIN Bello sent at 6/21/2021 12:46 PM EDT -----  Notify patient of chest xray results:  Hyperexpansion suggesting COPD.  No active process.

## 2021-06-24 ENCOUNTER — OFFICE VISIT (OUTPATIENT)
Dept: CARDIOLOGY | Facility: CLINIC | Age: 71
End: 2021-06-24

## 2021-06-24 VITALS
BODY MASS INDEX: 40.77 KG/M2 | HEART RATE: 68 BPM | HEIGHT: 68 IN | DIASTOLIC BLOOD PRESSURE: 72 MMHG | WEIGHT: 269 LBS | SYSTOLIC BLOOD PRESSURE: 128 MMHG

## 2021-06-24 DIAGNOSIS — I48.0 PAROXYSMAL ATRIAL FIBRILLATION (HCC): ICD-10-CM

## 2021-06-24 DIAGNOSIS — I10 ESSENTIAL HYPERTENSION: ICD-10-CM

## 2021-06-24 DIAGNOSIS — E78.2 MIXED HYPERLIPIDEMIA: ICD-10-CM

## 2021-06-24 DIAGNOSIS — I50.33 ACUTE ON CHRONIC DIASTOLIC CHF (CONGESTIVE HEART FAILURE) (HCC): Primary | ICD-10-CM

## 2021-06-24 PROCEDURE — 99214 OFFICE O/P EST MOD 30 MIN: CPT | Performed by: INTERNAL MEDICINE

## 2021-06-24 RX ORDER — METOPROLOL SUCCINATE 25 MG/1
TABLET, EXTENDED RELEASE ORAL
Qty: 45 TABLET | Refills: 3 | Status: SHIPPED | OUTPATIENT
Start: 2021-06-24 | End: 2022-03-30

## 2021-06-24 RX ORDER — PROPAFENONE HYDROCHLORIDE 150 MG/1
150 TABLET, COATED ORAL EVERY 8 HOURS
Qty: 90 TABLET | Refills: 3 | Status: SHIPPED | OUTPATIENT
Start: 2021-06-24 | End: 2021-08-12

## 2021-06-24 RX ORDER — POTASSIUM CHLORIDE 1500 MG/1
20 TABLET, FILM COATED, EXTENDED RELEASE ORAL DAILY
COMMUNITY
Start: 2021-04-18 | End: 2021-08-12

## 2021-06-24 RX ORDER — HYDROCODONE BITARTRATE AND ACETAMINOPHEN 10; 325 MG/1; MG/1
TABLET ORAL
COMMUNITY
Start: 2021-05-28

## 2021-06-24 RX ORDER — ESTRADIOL 0.1 MG/G
CREAM VAGINAL
COMMUNITY

## 2021-06-24 RX ORDER — BUDESONIDE AND FORMOTEROL FUMARATE DIHYDRATE 160; 4.5 UG/1; UG/1
2 AEROSOL RESPIRATORY (INHALATION)
COMMUNITY
End: 2021-10-18 | Stop reason: SDUPTHER

## 2021-06-24 RX ORDER — FUROSEMIDE 20 MG/1
TABLET ORAL
COMMUNITY
Start: 2021-05-06

## 2021-06-24 NOTE — PROGRESS NOTES
Chief Complaint  Atrial Fibrillation, Hypertension, Shortness of Breath, and Fatigue    Subjective      Patient still with persistent and marked dyspnea on exertion syms limiting her functional capacity.  She has had stable weight noptom increased edema  Sylwia Traylor presents to [facility] CARDIOLOGY      Past Medical History:   Diagnosis Date   • Abnormal ECG    • Asthma    • Burning mouth syndrome    • Chronic kidney disease    • COPD (chronic obstructive pulmonary disease) (CMS/[facility])    • Depression    • Diastolic CHF 6/24/2021   • Essential hypertension 6/20/2012   • GERD (gastroesophageal reflux disease)    • Hyperlipidemia    • Oral lesion    • Paroxysmal atrial fibrillation (CMS/[facility]) 5/24/2021         Current Outpatient Medications:   •  acetaminophen (TYLENOL) 500 MG tablet, Take 500 mg by mouth., Disp: , Rfl:   •  apixaban (ELIQUIS) 5 MG tablet tablet, Take 5 mg by mouth 2 (Two) Times a Day., Disp: , Rfl:   •  atorvastatin (LIPITOR) 80 MG tablet, Take 80 mg by mouth Daily., Disp: , Rfl:   •  baclofen (LIORESAL) 10 MG tablet, Take 10 mg by mouth 3 (Three) Times a Day., Disp: , Rfl:   •  budesonide-formoterol (SYMBICORT) 160-4.5 MCG/ACT inhaler, Inhale 2 puffs 2 (Two) Times a Day., Disp: , Rfl:   •  Cholecalciferol (VITAMIN D3 PO), Take 2,000 Units by mouth Daily., Disp: , Rfl:   •  citalopram (CeleXA) 20 MG tablet, Take 20 mg by mouth Daily., Disp: , Rfl:   •  diphenhydrAMINE (BENADRYL) 25 MG tablet, Take 25 mg by mouth Every 6 (Six) Hours As Needed., Disp: , Rfl:   •  furosemide (LASIX) 20 MG tablet, furosemide 20 mg tablet, Disp: , Rfl:   •  gabapentin (NEURONTIN) 300 MG capsule, Take 300 mg by mouth Daily., Disp: , Rfl:   •  hydroCHLOROthiazide (HYDRODIURIL) 25 MG tablet, Take 25 mg by mouth Daily., Disp: , Rfl:   •  HYDROcodone-acetaminophen (NORCO)  MG per tablet, hydrocodone 10 mg-acetaminophen 325 mg tablet, Disp: , Rfl:   •  Lactobacillus (FLORAJEN ACIDOPHILUS PO), Take  460 mg by mouth Daily., Disp: , Rfl:   •  mirtazapine (REMERON) 7.5 MG half tablet, Take 15 mg by mouth Every Night., Disp: , Rfl:   •  multivitamin with minerals tablet tablet, Take 1 tablet by mouth Daily., Disp: , Rfl:   •  pantoprazole (PROTONIX) 40 MG EC tablet, Take 40 mg by mouth Daily., Disp: , Rfl:   •  potassium chloride ER (K-TAB) 20 MEQ tablet controlled-release ER tablet, Take 20 mEq by mouth Daily., Disp: , Rfl:   •  spironolactone (ALDACTONE) 25 MG tablet, Take 25 mg by mouth Daily., Disp: , Rfl:   •  budesonide (PULMICORT) 0.5 MG/2ML nebulizer solution, Take 0.5 mg by nebulization Daily., Disp: , Rfl:   •  budesonide (PULMICORT) 180 MCG/ACT inhaler, Inhale 1 puff 2 (Two) Times a Day., Disp: , Rfl:   •  estradiol (ESTRACE) 0.1 MG/GM vaginal cream, estradiol 0.01% (0.1 mg/gram) vaginal cream, Disp: , Rfl:   •  FORMOTEROL FUMARATE IN, Inhale., Disp: , Rfl:   •  metoprolol succinate XL (TOPROL-XL) 25 MG 24 hr tablet, 1/2 tab qd, Disp: 45 tablet, Rfl: 3  •  propafenone (RYTHMOL) 150 MG tablet, Take 1 tablet by mouth Every 8 (Eight) Hours., Disp: 90 tablet, Rfl: 3    Medications Discontinued During This Encounter   Medication Reason   • METOPROLOL SUCCINATE ER PO Reorder     Allergies   Allergen Reactions   • Erythromycin Other (See Comments)     Stroke like   • Mercury Unknown - Low Severity   • Metoclopramide Other (See Comments)     Decreased blood pressure     • Penicillins Unknown - Low Severity        Social History     Tobacco Use   • Smoking status: Former Smoker     Packs/day: 0.50     Quit date: 2016     Years since quittin.4   • Smokeless tobacco: Never Used   Vaping Use   • Vaping Use: Never used   Substance Use Topics   • Alcohol use: Never     Comment: Does not drink   • Drug use: Never       Family History   Problem Relation Age of Onset   • Stroke Mother    • Heart disease Mother    • Diabetes Mother    • Pancreatic cancer Mother    • Heart disease Father    • Heart disease Sister    •  "Cervical cancer Sister    • Uterine cancer Sister    • Heart disease Brother    • Stroke Other         Maternal grandparent/Paternal grandparent   • Heart disease Other         Maternal grandparent/Paternal grandparent   • Stroke Maternal Aunt    • Heart disease Maternal Aunt         Objective     /72   Pulse 68   Ht 172.7 cm (68\")   Wt 122 kg (269 lb)   BMI 40.90 kg/m²       Physical Exam  Constitutional:       General: He is awake. He is not in acute distress.     Appearance: Normal appearance.   Neck:      Vascular: No carotid bruit, hepatojugular reflux or JVD.   Cardiovascular:      Rate and Rhythm: Normal rate and regular rhythm.      Chest Wall: PMI is not displaced.      Heart sounds: Normal heart sounds, S1 normal and S2 normal. No murmur heard.   No friction rub. No gallop. No S3 or S4 sounds.    Pulmonary:      Effort: Pulmonary effort is normal.      Breath sounds: Normal breath sounds. No wheezing, rhonchi or rales.   Ext.      Right lower leg: No edema.      Left lower leg: No edema.   Skin:     General: Skin is warm and dry.      Coloration: Skin is not cyanotic.      Findings: No petechiae or rash.   Neurological:      Mental Status: He is alert.   Psychiatric:         Behavior: Behavior is cooperative.         Result Review :     proBNP   Date Value Ref Range Status   06/21/2021 32.0 0.0 - 900.0 pg/mL Final     CMP    CMP 4/18/21 4/29/21 6/21/21   Glucose   97   Glucose 99 98    BUN 17 23 27 (A)   Creatinine 1.23 (A) 1.37 (A) 1.87 (A)   eGFR Non African Am   27 (A)   Sodium 140 137 139   Potassium 3.6 4.2 4.1   Chloride 101 98 (A) 96 (A)   Calcium 9.2 9.4 9.5   Albumin 4.3     Total Bilirubin 0.74     Alkaline Phosphatase 82     AST (SGOT) 22     ALT (SGPT) 17     (A) Abnormal value            CBC w/diff    CBC w/Diff 9/1/20 4/6/21 4/18/21   WBC 9.23 10.73 11.65 (A)   RBC 4.95 4.81 4.50   Hemoglobin 15.3 14.5 13.5   Hematocrit 44.4 42.0 39.1   MCV 89.7 87.3 86.9   MCH 30.9 30.1 30.0   MCHC " 34.5 34.5 34.5   RDW 13.3 14.1 14.1   Platelets 308 337 341   Neutrophil Rel % 56.3 58.4 48.9   Immature Granulocyte Rel %  0.5    Lymphocyte Rel % 30.9 28.1 37.2   Monocyte Rel % 9.4 9.2 10.4 (A)   Eosinophil Rel % 2.0 3.1 2.6   Basophil Rel % 0.5 0.7 0.6   (A) Abnormal value             Lab Results   Component Value Date    TSH 1.740 01/01/2020      Lab Results   Component Value Date    FREET4 1.4 01/01/2020      No results found for: DDIMERQUANT  Magnesium   Date Value Ref Range Status   01/02/2020 1.76 1.60 - 2.30 mg/dL Final      No results found for: DIGOXIN   Lab Results   Component Value Date    TROPONINT <0.01 04/18/2021           Lipid Panel    Lipid Panel 7/8/20   Total Cholesterol 130   Triglycerides 222 (A)   HDL Cholesterol 47   VLDL Cholesterol 44 (A)   LDL Cholesterol  39 (A)   (A) Abnormal value       Comments are available for some flowsheets but are not being displayed.           Chest x-ray showed no pulmonary edema                 Diagnoses and all orders for this visit:    1. Diastolic CHF (Primary)  Assessment & Plan:  Patient with no increased weight or evidence of fluid overload on exam.  Normal proBNP level chest x-ray showing no edema.  Addition she has increasing BUN/creatinine ratio that would favor patient has not volume overloaded.  I have explanation for her persistent shortness of breath issues but favor a noncardiac etiology.  Recommended discussing with her PCP if no other etiology is felt to explain her symptoms and recommended she call back and will undergo right heart catheterization to evaluate for possible pulmonary hypertension and/or any evidence of volume overload based on invasive measurements      2. Paroxysmal atrial fibrillation (CMS/[facility])  Assessment & Plan:  Currently in normal sinus rhythm Holter showed less than 3% the time atrial fibrillation did not feel that this could explain her persistent shortness of breath symptoms.  Recommended a trial of Rythmol for rhythm  maintenance.  Repeat EKG on Monday    Orders:  -     ECG 12 Lead; Future    3. Essential hypertension    4. Mixed hyperlipidemia    Other orders  -     metoprolol succinate XL (TOPROL-XL) 25 MG 24 hr tablet; 1/2 tab qd  Dispense: 45 tablet; Refill: 3  -     propafenone (RYTHMOL) 150 MG tablet; Take 1 tablet by mouth Every 8 (Eight) Hours.  Dispense: 90 tablet; Refill: 3          Follow Up     Return in about 6 months (around 12/24/2021) for EKG with F/U.    Patient was given instructions and counseling regarding her condition or for health maintenance advice. Please see specific information pulled into the AVS if appropriate.

## 2021-06-24 NOTE — ASSESSMENT & PLAN NOTE
Currently in normal sinus rhythm Holter showed less than 3% the time atrial fibrillation did not feel that this could explain her persistent shortness of breath symptoms.  Recommended a trial of Rythmol for rhythm maintenance.  Repeat EKG on Monday

## 2021-06-24 NOTE — ASSESSMENT & PLAN NOTE
Patient with no increased weight or evidence of fluid overload on exam.  Normal proBNP level chest x-ray showing no edema.  Addition she has increasing BUN/creatinine ratio that would favor patient has not volume overloaded.  I have explanation for her persistent shortness of breath issues but favor a noncardiac etiology.  Recommended discussing with her PCP if no other etiology is felt to explain her symptoms and recommended she call back and will undergo right heart catheterization to evaluate for possible pulmonary hypertension and/or any evidence of volume overload based on invasive measurements

## 2021-06-24 NOTE — PROGRESS NOTES
Chief Complaint  Atrial Fibrillation, Hypertension, Shortness of Breath, and Fatigue    Subjective      Patient still with persistent and marked dyspnea on exertion symptoms limiting her functional capacity.  She has had stable weight no increased edema  Sylwia Traylor presents to [facility] CARDIOLOGY      Past Medical History:   Diagnosis Date   • Abnormal ECG    • Asthma    • Burning mouth syndrome    • Chronic kidney disease    • COPD (chronic obstructive pulmonary disease) (CMS/[facility])    • Depression    • Diastolic CHF 6/24/2021   • Essential hypertension 6/20/2012   • GERD (gastroesophageal reflux disease)    • Hyperlipidemia    • Oral lesion    • Paroxysmal atrial fibrillation (CMS/[facility]) 5/24/2021         Current Outpatient Medications:   •  acetaminophen (TYLENOL) 500 MG tablet, Take 500 mg by mouth., Disp: , Rfl:   •  apixaban (ELIQUIS) 5 MG tablet tablet, Take 5 mg by mouth 2 (Two) Times a Day., Disp: , Rfl:   •  atorvastatin (LIPITOR) 80 MG tablet, Take 80 mg by mouth Daily., Disp: , Rfl:   •  baclofen (LIORESAL) 10 MG tablet, Take 10 mg by mouth 3 (Three) Times a Day., Disp: , Rfl:   •  budesonide-formoterol (SYMBICORT) 160-4.5 MCG/ACT inhaler, Inhale 2 puffs 2 (Two) Times a Day., Disp: , Rfl:   •  Cholecalciferol (VITAMIN D3 PO), Take 2,000 Units by mouth Daily., Disp: , Rfl:   •  citalopram (CeleXA) 20 MG tablet, Take 20 mg by mouth Daily., Disp: , Rfl:   •  diphenhydrAMINE (BENADRYL) 25 MG tablet, Take 25 mg by mouth Every 6 (Six) Hours As Needed., Disp: , Rfl:   •  furosemide (LASIX) 20 MG tablet, furosemide 20 mg tablet, Disp: , Rfl:   •  gabapentin (NEURONTIN) 300 MG capsule, Take 300 mg by mouth Daily., Disp: , Rfl:   •  hydroCHLOROthiazide (HYDRODIURIL) 25 MG tablet, Take 25 mg by mouth Daily., Disp: , Rfl:   •  HYDROcodone-acetaminophen (NORCO)  MG per tablet, hydrocodone 10 mg-acetaminophen 325 mg tablet, Disp: , Rfl:   •  Lactobacillus (FLORAJEN ACIDOPHILUS PO), Take  460 mg by mouth Daily., Disp: , Rfl:   •  METOPROLOL SUCCINATE ER PO, Take 25 mg by mouth Daily. 1/2 tab qd, Disp: , Rfl:   •  mirtazapine (REMERON) 7.5 MG half tablet, Take 15 mg by mouth Every Night., Disp: , Rfl:   •  multivitamin with minerals tablet tablet, Take 1 tablet by mouth Daily., Disp: , Rfl:   •  pantoprazole (PROTONIX) 40 MG EC tablet, Take 40 mg by mouth Daily., Disp: , Rfl:   •  potassium chloride ER (K-TAB) 20 MEQ tablet controlled-release ER tablet, Take 20 mEq by mouth Daily., Disp: , Rfl:   •  spironolactone (ALDACTONE) 25 MG tablet, Take 25 mg by mouth Daily., Disp: , Rfl:   •  budesonide (PULMICORT) 0.5 MG/2ML nebulizer solution, Take 0.5 mg by nebulization Daily., Disp: , Rfl:   •  budesonide (PULMICORT) 180 MCG/ACT inhaler, Inhale 1 puff 2 (Two) Times a Day., Disp: , Rfl:   •  estradiol (ESTRACE) 0.1 MG/GM vaginal cream, estradiol 0.01% (0.1 mg/gram) vaginal cream, Disp: , Rfl:   •  FORMOTEROL FUMARATE IN, Inhale., Disp: , Rfl:     There are no discontinued medications.  Allergies   Allergen Reactions   • Erythromycin Other (See Comments)     Stroke like   • Mercury Unknown - Low Severity   • Metoclopramide Other (See Comments)     Decreased blood pressure     • Penicillins Unknown - Low Severity        Social History     Tobacco Use   • Smoking status: Former Smoker     Packs/day: 0.50     Quit date:      Years since quittin.4   • Smokeless tobacco: Never Used   Vaping Use   • Vaping Use: Never used   Substance Use Topics   • Alcohol use: Never     Comment: Does not drink   • Drug use: Never       Family History   Problem Relation Age of Onset   • Stroke Mother    • Heart disease Mother    • Diabetes Mother    • Pancreatic cancer Mother    • Heart disease Father    • Heart disease Sister    • Cervical cancer Sister    • Uterine cancer Sister    • Heart disease Brother    • Stroke Other         Maternal grandparent/Paternal grandparent   • Heart disease Other         Maternal  "grandparent/Paternal grandparent   • Stroke Maternal Aunt    • Heart disease Maternal Aunt         Objective     /72   Pulse 68   Ht 172.7 cm (68\")   Wt 122 kg (269 lb)   BMI 40.90 kg/m²       Physical Exam  Constitutional:       General: He is awake. He is not in acute distress.     Appearance: Normal appearance.   Neck:      Vascular: No carotid bruit, hepatojugular reflux or JVD.   Cardiovascular:      Rate and Rhythm: Normal rate and regular rhythm.      Chest Wall: PMI is not displaced.      Heart sounds: Normal heart sounds, S1 normal and S2 normal. No murmur heard.   No friction rub. No gallop. No S3 or S4 sounds.    Pulmonary:      Effort: Pulmonary effort is normal.      Breath sounds: Normal breath sounds. No wheezing, rhonchi or rales.   Ext.      Right lower leg: No edema.      Left lower leg: No edema.   Skin:     General: Skin is warm and dry.      Coloration: Skin is not cyanotic.      Findings: No petechiae or rash.   Neurological:      Mental Status: He is alert.   Psychiatric:         Behavior: Behavior is cooperative.         Result Review :     proBNP   Date Value Ref Range Status   06/21/2021 32.0 0.0 - 900.0 pg/mL Final     CMP    CMP 4/18/21 4/29/21 6/21/21   Glucose   97   Glucose 99 98    BUN 17 23 27 (A)   Creatinine 1.23 (A) 1.37 (A) 1.87 (A)   eGFR Non African Am   27 (A)   Sodium 140 137 139   Potassium 3.6 4.2 4.1   Chloride 101 98 (A) 96 (A)   Calcium 9.2 9.4 9.5   Albumin 4.3     Total Bilirubin 0.74     Alkaline Phosphatase 82     AST (SGOT) 22     ALT (SGPT) 17     (A) Abnormal value            CBC w/diff    CBC w/Diff 9/1/20 4/6/21 4/18/21   WBC 9.23 10.73 11.65 (A)   RBC 4.95 4.81 4.50   Hemoglobin 15.3 14.5 13.5   Hematocrit 44.4 42.0 39.1   MCV 89.7 87.3 86.9   MCH 30.9 30.1 30.0   MCHC 34.5 34.5 34.5   RDW 13.3 14.1 14.1   Platelets 308 337 341   Neutrophil Rel % 56.3 58.4 48.9   Immature Granulocyte Rel %  0.5    Lymphocyte Rel % 30.9 28.1 37.2   Monocyte Rel % 9.4 " 9.2 10.4 (A)   Eosinophil Rel % 2.0 3.1 2.6   Basophil Rel % 0.5 0.7 0.6   (A) Abnormal value             Lab Results   Component Value Date    TSH 1.740 01/01/2020      Lab Results   Component Value Date    FREET4 1.4 01/01/2020      No results found for: DDIMERQUANT  Magnesium   Date Value Ref Range Status   01/02/2020 1.76 1.60 - 2.30 mg/dL Final      No results found for: DIGOXIN   Lab Results   Component Value Date    TROPONINT <0.01 04/18/2021           Lipid Panel    Lipid Panel 7/8/20   Total Cholesterol 130   Triglycerides 222 (A)   HDL Cholesterol 47   VLDL Cholesterol 44 (A)   LDL Cholesterol  39 (A)   (A) Abnormal value       Comments are available for some flowsheets but are not being displayed.                            Diagnoses and all orders for this visit:    1. Diastolic CHF (Primary)    2. Paroxysmal atrial fibrillation (CMS/HCC)    3. Essential hypertension    4. Mixed hyperlipidemia    Other orders  -     metoprolol succinate XL (TOPROL-XL) 25 MG 24 hr tablet; 1/2 tab qd  Dispense: 45 tablet; Refill: 3            Follow Up     No follow-ups on file.    Patient was given instructions and counseling regarding her condition or for health maintenance advice. Please see specific information pulled into the AVS if appropriate.

## 2021-06-24 NOTE — PATIENT INSTRUCTIONS
Heart Failure, Self Care  Heart failure is a serious condition. This sheet explains things you need to do to take care of yourself at home. To help you stay as healthy as possible, you may be asked to change your diet, take certain medicines, and make other changes in your life. Your doctor may also give you more specific instructions. If you have problems or questions, call your doctor.  What are the risks?  Having heart failure makes it more likely for you to have some problems. These problems can get worse if you do not take good care of yourself. Problems may include:  · Blood clotting problems. This may cause a stroke.  · Damage to the kidneys, liver, or lungs.  · Abnormal heart rhythms.  Supplies needed:  · Scale for weighing yourself.  · Blood pressure monitor.  · Notebook.  · Medicines.  How to care for yourself when you have heart failure  Medicines  Take over-the-counter and prescription medicines only as told by your doctor. Take your medicines every day.  · Do not stop taking your medicine unless your doctor tells you to do so.  · Do not skip any medicines.  · Get your prescriptions refilled before you run out of medicine. This is important.  Eating and drinking    · Eat heart-healthy foods. Talk with a diet specialist (dietitian) to create an eating plan.  · Choose foods that:  ? Have no trans fat.  ? Are low in saturated fat and cholesterol.  · Choose healthy foods, such as:  ? Fresh or frozen fruits and vegetables.  ? Fish.  ? Low-fat (lean) meats.  ? Legumes, such as beans, peas, and lentils.  ? Fat-free or low-fat dairy products.  ? Whole-grain foods.  ? High-fiber foods.  · Limit salt (sodium) if told by your doctor. Ask your diet specialist to tell you which seasonings are healthy for your heart.  · Cook in healthy ways instead of frying. Healthy ways of cooking include roasting, grilling, broiling, baking, poaching, steaming, and stir-frying.  · Limit how much fluid you drink, if told by your  doctor.  Alcohol use  · Do not drink alcohol if:  ? Your doctor tells you not to drink.  ? Your heart was damaged by alcohol, or you have very bad heart failure.  ? You are pregnant, may be pregnant, or are planning to become pregnant.  · If you drink alcohol:  ? Limit how much you use to:  § 0-1 drink a day for women.  § 0-2 drinks a day for men.  ? Be aware of how much alcohol is in your drink. In the [place] one drink equals one 12 oz bottle of beer (355 mL), one 5 oz glass of wine (148 mL), or one 1½ oz glass of hard liquor (44 mL).  Lifestyle    · Do not use any products that contain nicotine or tobacco, such as cigarettes, e-cigarettes, and chewing tobacco. If you need help quitting, ask your doctor.  ? Do not use nicotine gum or patches before talking to your doctor.  · Do not use illegal drugs.  · Lose weight if told by your doctor.  · Do physical activity if told by your doctor. Talk to your doctor before you begin an exercise if:  ? You are an older adult.  ? You have very bad heart failure.  · Learn to manage stress. If you need help, ask your doctor.  · Get rehab (rehabilitation) to help you stay independent and to help with your quality of life.  · Plan time to rest when you get tired.  Check weight and blood pressure    · Weigh yourself every day. This will help you to know if fluid is building up in your body.  ? Weigh yourself every morning after you pee (urinate) and before you eat breakfast.  ? Wear the same amount of clothing each time.  ? Write down your daily weight. Give your record to your doctor.  · Check and write down your blood pressure as told by your doctor.  · Check your pulse as told by your doctor.  Dealing with very hot and very cold weather  · If it is very hot:  ? Avoid activities that take a lot of energy.  ? Use air conditioning or fans, or find a cooler place.  ? Avoid caffeine and alcohol.  ? Wear clothing that is loose-fitting, lightweight, and light-colored.  · If it is very  cold:  ? Avoid activities that take a lot of energy.  ? Layer your clothes.  ? Wear mittens or gloves, a hat, and a scarf when you go outside.  ? Avoid alcohol.  Follow these instructions at home:  · Stay up to date with shots (vaccines). Get pneumococcal and flu (influenza) shots.  · Keep all follow-up visits as told by your doctor. This is important.  Contact a doctor if:  · You gain weight quickly.  · You have increasing shortness of breath.  · You cannot do your normal activities.  · You get tired easily.  · You cough a lot.  · You don't feel like eating or feel like you may vomit (nauseous).  · You become puffy (swell) in your hands, feet, ankles, or belly (abdomen).  · You cannot sleep well because it is hard to breathe.  · You feel like your heart is beating fast (palpitations).  · You get dizzy when you stand up.  Get help right away if:  · You have trouble breathing.  · You or someone else notices a change in your behavior, such as having trouble staying awake.  · You have chest pain or discomfort.  · You pass out (faint).  These symptoms may be an emergency. Do not wait to see if the symptoms will go away. Get medical help right away. Call your local emergency services (911 in the [place] Do not drive yourself to the hospital.  Summary  · Heart failure is a serious condition. To care for yourself, you may have to change your diet, take medicines, and make other lifestyle changes.  · Take your medicines every day. Do not stop taking them unless your doctor tells you to do so.  · Eat heart-healthy foods, such as fresh or frozen fruits and vegetables, fish, lean meats, legumes, fat-free or low-fat dairy products, and whole-grain or high-fiber foods.  · Ask your doctor if you can drink alcohol. You may have to stop alcohol use if you have very bad heart failure.  · Contact your doctor if you gain weight quickly or feel that your heart is beating too fast. Get help right away if you pass out, or have chest pain  "or trouble breathing.  This information is not intended to replace advice given to you by your health care provider. Make sure you discuss any questions you have with your health care provider.  Document Revised: 03/31/2020 Document Reviewed: 04/01/2020  [employer] Patient Education © 2021 [employer]      Low-Sodium Eating Plan  Sodium, which is an element that makes up salt, helps you maintain a healthy balance of fluids in your body. Too much sodium can increase your blood pressure and cause fluid and waste to be held in your body.  Your health care provider or dietitian may recommend following this plan if you have high blood pressure (hypertension), kidney disease, liver disease, or heart failure. Eating less sodium can help lower your blood pressure, reduce swelling, and protect your heart, liver, and kidneys.  What are tips for following this plan?  Reading food labels  · The Nutrition Facts label lists the amount of sodium in one serving of the food. If you eat more than one serving, you must multiply the listed amount of sodium by the number of servings.  · Choose foods with less than 140 mg of sodium per serving.  · Avoid foods with 300 mg of sodium or more per serving.  Shopping    · Look for lower-sodium products, often labeled as \"low-sodium\" or \"no salt added.\"  · Always check the sodium content, even if foods are labeled as \"unsalted\" or \"no salt added.\"  · Buy fresh foods.  ? Avoid canned foods and pre-made or frozen meals.  ? Avoid canned, cured, or processed meats.  · Buy breads that have less than 80 mg of sodium per slice.  Cooking    · Eat more home-cooked food and less restaurant, buffet, and fast food.  · Avoid adding salt when cooking. Use salt-free seasonings or herbs instead of table salt or sea salt. Check with your health care provider or pharmacist before using salt substitutes.  · Cook with plant-based oils, such as canola, sunflower, or olive oil.  Meal planning  · When eating at a " "restaurant, ask that your food be prepared with less salt or no salt, if possible. Avoid dishes labeled as brined, pickled, cured, smoked, or made with soy sauce, miso, or teriyaki sauce.  · Avoid foods that contain MSG (monosodium glutamate). MSG is sometimes added to Chinese food, bouillon, and some canned foods.  · Make meals that can be grilled, baked, poached, roasted, or steamed. These are generally made with less sodium.  General information  Most people on this plan should limit their sodium intake to 1,500-2,000 mg (milligrams) of sodium each day.  What foods should I eat?  Fruits  Fresh, frozen, or canned fruit. Fruit juice.  Vegetables  Fresh or frozen vegetables. \"No salt added\" canned vegetables. \"No salt added\" tomato sauce and paste. Low-sodium or reduced-sodium tomato and vegetable juice.  Grains  Low-sodium cereals, including oats, puffed wheat and rice, and shredded wheat. Low-sodium crackers. Unsalted rice. Unsalted pasta. Low-sodium bread. Whole-grain breads and whole-grain pasta.  Meats and other proteins  Fresh or frozen (no salt added) meat, poultry, seafood, and fish. Low-sodium canned tuna and salmon. Unsalted nuts. Dried peas, beans, and lentils without added salt. Unsalted canned beans. Eggs. Unsalted nut butters.  Dairy  Milk. Soy milk. Cheese that is naturally low in sodium, such as ricotta cheese, fresh mozzarella, or Swiss cheese. Low-sodium or reduced-sodium cheese. Cream cheese. Yogurt.  Seasonings and condiments  Fresh and dried herbs and spices. Salt-free seasonings. Low-sodium mustard and ketchup. Sodium-free salad dressing. Sodium-free light mayonnaise. Fresh or refrigerated horseradish. Lemon juice. Vinegar.  Other foods  Homemade, reduced-sodium, or low-sodium soups. Unsalted popcorn and pretzels. Low-salt or salt-free chips.  The items listed above may not be a complete list of foods and beverages you can eat. Contact a dietitian for more information.  What foods should I " avoid?  Vegetables  Sauerkraut, pickled vegetables, and relishes. Olives. French fries. Onion rings. Regular canned vegetables (not low-sodium or reduced-sodium). Regular canned tomato sauce and paste (not low-sodium or reduced-sodium). Regular tomato and vegetable juice (not low-sodium or reduced-sodium). Frozen vegetables in sauces.  Grains  Instant hot cereals. Bread stuffing, pancake, and biscuit mixes. Croutons. Seasoned rice or pasta mixes. Noodle soup cups. Boxed or frozen macaroni and cheese. Regular salted crackers. Self-rising flour.  Meats and other proteins  Meat or fish that is salted, canned, smoked, spiced, or pickled. Precooked or cured meat, such as sausages or meat loaves. Bhatia. Ham. Pepperoni. Hot dogs. Corned beef. Chipped beef. Salt pork. Jerky. Pickled herring. Anchovies and sardines. Regular canned tuna. Salted nuts.  Dairy  Processed cheese and cheese spreads. Hard cheeses. Cheese curds. Blue cheese. Feta cheese. String cheese. Regular cottage cheese. Buttermilk. Canned milk.  Fats and oils  Salted butter. Regular margarine. Ghee. Bhatia fat.  Seasonings and condiments  Onion salt, garlic salt, seasoned salt, table salt, and sea salt. Canned and packaged gravies. Worcestershire sauce. Tartar sauce. Barbecue sauce. Teriyaki sauce. Soy sauce, including reduced-sodium. Steak sauce. Fish sauce. Oyster sauce. Cocktail sauce. Horseradish that you find on the shelf. Regular ketchup and mustard. Meat flavorings and tenderizers. Bouillon cubes. Hot sauce. Pre-made or packaged marinades. Pre-made or packaged taco seasonings. Relishes. Regular salad dressings. Salsa.  Other foods  Salted popcorn and pretzels. Corn chips and puffs. Potato and tortilla chips. Canned or dried soups. Pizza. Frozen entrees and pot pies.  The items listed above may not be a complete list of foods and beverages you should avoid. Contact a dietitian for more information.  Summary  · Eating less sodium can help lower your blood  pressure, reduce swelling, and protect your heart, liver, and kidneys.  · Most people on this plan should limit their sodium intake to 1,500-2,000 mg (milligrams) of sodium each day.  · Canned, boxed, and frozen foods are high in sodium. Restaurant foods, fast foods, and pizza are also very high in sodium. You also get sodium by adding salt to food.  · Try to cook at home, eat more fresh fruits and vegetables, and eat less fast food and canned, processed, or prepared foods.  This information is not intended to replace advice given to you by your health care provider. Make sure you discuss any questions you have with your health care provider.  Document Revised: 01/22/2021 Document Reviewed: 11/18/2020  [employer] Patient Education © 2021 [employer]    Check weight daily--same scale, same time every day.  For weight gain greater than 5 pounds in 3 days, call office.

## 2021-06-29 ENCOUNTER — CLINICAL SUPPORT (OUTPATIENT)
Dept: CARDIOLOGY | Facility: CLINIC | Age: 71
End: 2021-06-29

## 2021-06-29 VITALS — HEART RATE: 58 BPM

## 2021-06-29 DIAGNOSIS — R42 DIZZINESS: ICD-10-CM

## 2021-06-29 DIAGNOSIS — I48.0 PAROXYSMAL ATRIAL FIBRILLATION (HCC): Primary | ICD-10-CM

## 2021-06-29 PROCEDURE — 93000 ELECTROCARDIOGRAM COMPLETE: CPT | Performed by: NURSE PRACTITIONER

## 2021-06-29 PROCEDURE — 99213 OFFICE O/P EST LOW 20 MIN: CPT | Performed by: NURSE PRACTITIONER

## 2021-06-29 NOTE — PATIENT INSTRUCTIONS
Propafenone tablets  What is this medicine?  PROPAFENONE (proe pa FEEN one) is an antiarrhythmic agent. It is used to treat irregular heart rhythm and can slow rapid heartbeats. This medicine can help your heart to return to and maintain a normal rhythm.  This medicine may be used for other purposes; ask your health care provider or pharmacist if you have questions.  COMMON BRAND NAME(S): Rythmol  What should I tell my health care provider before I take this medicine?  They need to know if you have any of these conditions:  · heart disease  · high blood levels of potassium  · kidney disease  · liver disease  · low blood pressure  · lung disease like asthma, chronic bronchitis or emphysema  · pacemaker  · slow heart rate  · an unusual or allergic reaction to propafenone, other medicines, foods, dyes, or preservatives  · pregnant or trying to get pregnant  · breast-feeding  How should I use this medicine?  Take this medicine by mouth with a glass of water. Follow the directions on the prescription label. You can take this medicine with or without food. Take your doses at regular intervals. Do not take your medicine more often than directed. Do not stop taking except on the advice of your doctor or health care professional.  Talk to your pediatrician regarding the use of this medicine in children. Special care may be needed.  Overdosage: If you think you have taken too much of this medicine contact a poison control center or emergency room at once.  NOTE: This medicine is only for you. Do not share this medicine with others.  What if I miss a dose?  If you miss a dose, take it as soon as you can. If it is almost time for your next dose, take only that dose. Do not take double or extra doses.  What should I watch for while using this medicine?  Your condition will be monitored closely when you first begin therapy. Often, this drug is first started in a hospital or other monitored health care setting. Once you are on  maintenance therapy, visit your doctor or health care professional for regular checks on your progress. Because your condition and use of this medicine carry some risk, it is a good idea to carry an identification card, necklace or bracelet with details of your condition, medications, and doctor or health care professional.  You may get drowsy or dizzy. Do not drive, use machinery, or do anything that needs mental alertness until you know how this medicine affects you. Do not stand or sit up quickly, especially if you are an older patient. This reduces the risk of dizzy or fainting spells.  If you are going to have surgery, tell your doctor or health care professional that you are taking this medicine.  What side effects may I notice from receiving this medicine?  Side effects that you should report to your doctor or health care professional as soon as possible:  · chest pain, palpitations  · fever or chills  · shortness of breath  · swelling of feet or legs  · trembling or shaking  Side effects that usually do not require medical attention (report to your doctor or health care professional if they continue or are bothersome):  · blurred vision  · changes in taste (a metallic or bitter taste)  · constipation or diarrhea  · dry mouth  · headache  · nausea or vomiting  · tiredness or weakness  This list may not describe all possible side effects. Call your doctor for medical advice about side effects. You may report side effects to FDA at 5-141-FDA-2409.  Where should I keep my medicine?  Keep out of the reach of children.  Store at room temperature between 15 and 30 degrees C (59 and 86 degrees F). Protect from light. Keep container tightly closed. Throw away any unused medicine after the expiration date.  NOTE: This sheet is a summary. It may not cover all possible information. If you have questions about this medicine, talk to your doctor, pharmacist, or health care provider.  © 2021 [employer]  (2019-12-10 08:51:33)

## 2021-06-29 NOTE — PROGRESS NOTES
"Chief Complaint  EKG only originally but had medication questions for S.King COSTELLO    Subjective            History of Present Illness  Nica Traylor is a 70-year-old white/ female who presents to the office today for EKG only but wanted to see the nurse practitioner (me) because she had some medication questions.  On 6/24/2021 she saw Dr. Manzo with complaints of dyspnea and increased edema.  He prescribed her propafenone for rhythm maintenance in addition to the metoprolol she was already prescribed.  She reports that she can only take half of the prescribed metoprolol because when she took 1 whole tablet her heart rate dropped to the 40s.  When she tried to take the propafenone, she reports that her blood pressure \"dropped\" to 110/60.  She reports lightheadedness and dizziness after taking the propafenone so she only took it 1 day.        ECG 12 Lead    Date/Time: 6/29/2021 12:30 PM  Performed by: Lisa Dietz APRN  Authorized by: Chris Manzo MD   Comparison: compared with previous ECG from 5/10/2021  Similar to previous ECG  Rhythm: sinus rhythm  Rate: bradycardic  BPM: 58  Other findings: non-specific ST-T wave changes    Clinical impression: abnormal EKG            PMH  Past Medical History:   Diagnosis Date   • Asthma    • Burning mouth syndrome    • Chronic kidney disease    • COPD (chronic obstructive pulmonary disease)    • Depression    • Diastolic CHF 6/24/2021   • Essential hypertension 6/20/2012   • GERD (gastroesophageal reflux disease)    • Hyperlipidemia    • Oral lesion    • Paroxysmal atrial fibrillation 05/24/2021         ALLERGY  Allergies   Allergen Reactions   • Erythromycin Other (See Comments)     Stroke like   • Mercury Unknown - Low Severity   • Metoclopramide Other (See Comments)     Decreased blood pressure     • Penicillins Unknown - Low Severity          SURGICALHX  Past Surgical History:   Procedure Laterality Date   • CARPAL TUNNEL RELEASE     • ENDOSCOPY  2020 "   • GALLBLADDER SURGERY  2003   • HYSTERECTOMY            SOC  Social History     Socioeconomic History   • Marital status:      Spouse name: Not on file   • Number of children: Not on file   • Years of education: Not on file   • Highest education level: Not on file   Tobacco Use   • Smoking status: Former Smoker     Packs/day: 0.50     Quit date:      Years since quittin.4   • Smokeless tobacco: Never Used   Vaping Use   • Vaping Use: Never used   Substance and Sexual Activity   • Alcohol use: Never     Comment: Does not drink   • Drug use: Never   • Sexual activity: Defer         FAMHX  Family History   Problem Relation Age of Onset   • Stroke Mother    • Heart disease Mother    • Diabetes Mother    • Pancreatic cancer Mother    • Heart disease Father    • Heart disease Sister    • Cervical cancer Sister    • Uterine cancer Sister    • Heart disease Brother    • Stroke Other         Maternal grandparent/Paternal grandparent   • Heart disease Other         Maternal grandparent/Paternal grandparent   • Stroke Maternal Aunt    • Heart disease Maternal Aunt           MEDSIGONLY  Current Outpatient Medications on File Prior to Visit   Medication Sig   • acetaminophen (TYLENOL) 500 MG tablet Take 500 mg by mouth.   • apixaban (ELIQUIS) 5 MG tablet tablet Take 5 mg by mouth 2 (Two) Times a Day.   • atorvastatin (LIPITOR) 80 MG tablet Take 80 mg by mouth Daily.   • baclofen (LIORESAL) 10 MG tablet Take 10 mg by mouth 3 (Three) Times a Day.   • budesonide (PULMICORT) 0.5 MG/2ML nebulizer solution Take 0.5 mg by nebulization Daily.   • budesonide (PULMICORT) 180 MCG/ACT inhaler Inhale 1 puff 2 (Two) Times a Day.   • budesonide-formoterol (SYMBICORT) 160-4.5 MCG/ACT inhaler Inhale 2 puffs 2 (Two) Times a Day.   • Cholecalciferol (VITAMIN D3 PO) Take 2,000 Units by mouth Daily.   • citalopram (CeleXA) 20 MG tablet Take 20 mg by mouth Daily.   • diphenhydrAMINE (BENADRYL) 25 MG tablet Take 25 mg by mouth Every  6 (Six) Hours As Needed.   • estradiol (ESTRACE) 0.1 MG/GM vaginal cream estradiol 0.01% (0.1 mg/gram) vaginal cream   • FORMOTEROL FUMARATE IN Inhale.   • furosemide (LASIX) 20 MG tablet furosemide 20 mg tablet   • gabapentin (NEURONTIN) 300 MG capsule Take 300 mg by mouth Daily.   • hydroCHLOROthiazide (HYDRODIURIL) 25 MG tablet Take 25 mg by mouth Daily.   • HYDROcodone-acetaminophen (NORCO)  MG per tablet hydrocodone 10 mg-acetaminophen 325 mg tablet   • Lactobacillus (FLORAJEN ACIDOPHILUS PO) Take 460 mg by mouth Daily.   • metoprolol succinate XL (TOPROL-XL) 25 MG 24 hr tablet 1/2 tab qd   • mirtazapine (REMERON) 7.5 MG half tablet Take 15 mg by mouth Every Night.   • multivitamin with minerals tablet tablet Take 1 tablet by mouth Daily.   • pantoprazole (PROTONIX) 40 MG EC tablet Take 40 mg by mouth Daily.   • potassium chloride ER (K-TAB) 20 MEQ tablet controlled-release ER tablet Take 20 mEq by mouth Daily.   • propafenone (RYTHMOL) 150 MG tablet Take 1 tablet by mouth Every 8 (Eight) Hours.   • spironolactone (ALDACTONE) 25 MG tablet Take 25 mg by mouth Daily.     No current facility-administered medications on file prior to visit.         Objective   Pulse 58       Physical Exam  Constitutional:       General: He is awake. He is not in acute distress.     Appearance: Normal appearance.   Neck:      Vascular: No carotid bruit, hepatojugular reflux or JVD.   Cardiovascular:      Rate and Rhythm: Normal rate and regular rhythm.      Chest Wall: PMI is not displaced.      Heart sounds: Normal heart sounds, S1 normal and S2 normal. No murmur heard.   No friction rub. No gallop. No S3 or S4 sounds.    Pulmonary:      Effort: Pulmonary effort is normal.      Breath sounds: Normal breath sounds. No wheezing, rhonchi or rales.   Ext.      Right lower leg: No edema.      Left lower leg: No edema.   Skin:     General: Skin is warm and dry.      Coloration: Skin is not cyanotic.      Findings: No petechiae or  rash.   Neurological:      Mental Status: He is alert.   Psychiatric:         Behavior: Behavior is cooperative    Result Review :   The following data was reviewed by: PRAVIN Briones on 06/29/2021:  proBNP   Date Value Ref Range Status   06/21/2021 32.0 0.0 - 900.0 pg/mL Final     CMP    CMP 6/21/21   Glucose 97   Glucose    BUN 27 (A)   Creatinine 1.87 (A)   eGFR Non African Am 27 (A)   Sodium 139   Potassium 4.1   Chloride 96 (A)   Calcium 9.5   Albumin    Total Bilirubin    Alkaline Phosphatase    AST (SGOT)    ALT (SGPT)    (A) Abnormal value            CBC w/diff    CBC w/Diff 4/18/21   WBC 11.65 (A)   RBC 4.50   Hemoglobin 13.5   Hematocrit 39.1   MCV 86.9   MCH 30.0   MCHC 34.5   RDW 14.1   Platelets 341   Neutrophil Rel % 48.9   Immature Granulocyte Rel %    Lymphocyte Rel % 37.2   Monocyte Rel % 10.4 (A)   Eosinophil Rel % 2.6   Basophil Rel % 0.6   (A) Abnormal value             Lab Results   Component Value Date    TSH 1.740 01/01/2020      Lab Results   Component Value Date    FREET4 1.4 01/01/2020      No results found for: DDIMERQUANT  Magnesium   Date Value Ref Range Status   01/02/2020 1.76 1.60 - 2.30 mg/dL Final      No results found for: DIGOXIN   Lab Results   Component Value Date    TROPONINT <0.01 04/18/2021           Lipid Panel    Lipid Panel 7/8/20   Total Cholesterol 130   Triglycerides 222 (A)   HDL Cholesterol 47   VLDL Cholesterol 44 (A)   LDL Cholesterol  39 (A)   (A) Abnormal value       Comments are available for some flowsheets but are not being displayed.                 Assessment and Plan    Diagnoses and all orders for this visit:    1. Paroxysmal atrial fibrillation (CMS/HCC) (Primary)  -     ECG 12 Lead performed in office today which shows sinus rhythm, I advised her to stop the propafenone and to just take her metoprolol as prescribed for now.  Continue Eliquis for stroke prevention.  She verbalizes understanding and agrees at this time.    2. Dizziness  -     Duplex  Carotid Ultrasound CAR; Future   I reviewed and discussed all of her lab and imaging results, explaining that the etiology is likely noncardiac related and that if this carotid ultrasound has negative findings then she should go back to her PCP and either ask for referral to ENT or neuro for further work-up.  She verbalizes understanding and agrees, all questions answered.        Follow Up   I will call her when carotid ultrasound results are available to me.  She already has follow-up scheduled with Dr. Manzo November 24, 2021, advised to keep this appointment for further follow-up.    Patient was given instructions and counseling regarding her condition or for health maintenance advice. Please see specific information pulled into the AVS if appropriate.           Lisa Dietz, APRN  06/29/21  11:43 EDT    Dictated Utilizing Dragon Dictation

## 2021-06-29 NOTE — PROGRESS NOTES
Procedure     ECG 12 Lead    Date/Time: 6/29/2021 12:12 PM  Performed by: Chris Manzo MD  Authorized by: Chris Manzo MD   Comparison: compared with previous ECG from 5/10/2021  Similar to previous ECG  Rhythm: sinus rhythm  Rate: bradycardic  Rate comments: 58bpm  Other findings: non-specific ST-T wave changes    Clinical impression: abnormal EKG

## 2021-07-12 ENCOUNTER — TELEPHONE (OUTPATIENT)
Dept: CARDIOLOGY | Facility: CLINIC | Age: 71
End: 2021-07-12

## 2021-07-12 NOTE — TELEPHONE ENCOUNTER
Received VM from patient.   RE:  Requesting to speak with Lisa regarding recent testing results.  Call back number: [phone]

## 2021-07-12 NOTE — TELEPHONE ENCOUNTER
Spoke with patient, she states since test results were negative her PCP is sending her to neuro for further work up

## 2021-07-15 VITALS
SYSTOLIC BLOOD PRESSURE: 124 MMHG | WEIGHT: 267 LBS | HEIGHT: 68 IN | BODY MASS INDEX: 40.47 KG/M2 | HEART RATE: 68 BPM | DIASTOLIC BLOOD PRESSURE: 76 MMHG

## 2021-07-19 ENCOUNTER — TELEPHONE (OUTPATIENT)
Dept: CARDIOLOGY | Facility: CLINIC | Age: 71
End: 2021-07-19

## 2021-07-19 NOTE — TELEPHONE ENCOUNTER
Have her stop for 1 week and if dizziness resolves, let us know  If dizziness continues, start back the medication and follow up with ENT

## 2021-07-19 NOTE — TELEPHONE ENCOUNTER
Patient was seen at ENT office today for dizziness, blurred vision and off balance feeling. That  recommended the patient to stop Metoprolol and if that is what is causing the issues. Patient wants to know if she should do this. Please advise.

## 2021-07-28 NOTE — TELEPHONE ENCOUNTER
Patient called.  She states she continued to have dizziness.  She has restarted her Metoprolol and f/u with ENT.  Further testing has been ordered.

## 2021-08-05 NOTE — PROGRESS NOTES
Primary Care Provider  Wilder Morales MD     Referring Provider  No ref. provider found     Chief Complaint  Follow-up (3 month ), Cough, Shortness of Breath (with movement, chest pain), and Wheezing    Subjective          History of Presenting Illness  Patient is a 70-year-old  female, patient of Dr. Florence with a history of dyspnea and emphysema seen on CT scan and atrial fibrillation who presents for follow-up visit today.  Patient states that she is no longer taking nebulizer treatments and that she is just taking Symbicort 2 puffs twice a day.  Patient states that Symbicort  does help her wheezing and shortness of breath.  Patient states at times she will get short of breath with heavy exertion and when working in the yard that is mild to moderate in severity and improved with rest. Patient states that she is under the care of Dr. Manzo for atrial fibrillation.  Patient states that she is also taking Lasix every other day for leg swelling.  Patient denies fever, chills, night sweats, swollen glands in the head and neck, unintentional weight loss, hemoptysis, purulent sputum production, dysphagia, chest pain, palpitations, chest tightness, abdominal pain, nausea, vomiting, and diarrhea.  Patient also denies any myalgias, changes in sense of taste and/or smell, sore throat, any other coronavirus flulike symptoms.  Patient denies any orthopnea and paroxysmal nocturnal dyspnea.  Patient is able to perform her activities of daily living.  Review of Systems   Constitutional: Negative for activity change, appetite change, chills, diaphoresis, fatigue, fever, unexpected weight gain and unexpected weight loss.        Negative for Insomnia   HENT: Negative for congestion (Nasal), mouth sores, nosebleeds, postnasal drip, sore throat, swollen glands and trouble swallowing.         Negative for Thrush  Negative for Hoarseness  Negative for Allergies/Hay Fever  Negative for Recent Head injury  Negative for  Ear Fullness  Negative for Nasal or Sinus pain  Negative for Dry lips  Negative for Nasal discharge   Respiratory: Negative for apnea, cough, chest tightness, shortness of breath and wheezing.         Negative for Hemoptysis  Negative for Pleuritic pain   Cardiovascular: Negative for chest pain, palpitations and leg swelling.        Negative for Claudication  Negative for Cyanosis  Negative for Dyspnea on exertion   Gastrointestinal: Negative for abdominal pain, diarrhea, nausea, vomiting and GERD.   Musculoskeletal: Negative for joint swelling and myalgias.        Negative for Joint pain  Negative for Joint stiffness   Skin: Negative for color change, dry skin, pallor and rash.   Neurological: Negative for syncope, weakness and headache.   Hematological: Negative for adenopathy. Does not bruise/bleed easily.        Family History   Problem Relation Age of Onset   • Stroke Mother    • Heart disease Mother    • Diabetes Mother    • Pancreatic cancer Mother    • Heart disease Father    • Heart disease Sister    • Cervical cancer Sister    • Uterine cancer Sister    • Heart disease Brother    • Stroke Other         Maternal grandparent/Paternal grandparent   • Heart disease Other         Maternal grandparent/Paternal grandparent   • Stroke Maternal Aunt    • Heart disease Maternal Aunt         Social History     Socioeconomic History   • Marital status:      Spouse name: Not on file   • Number of children: Not on file   • Years of education: Not on file   • Highest education level: Not on file   Tobacco Use   • Smoking status: Former Smoker     Packs/day: 0.50     Quit date: 2016     Years since quittin.6   • Smokeless tobacco: Never Used   Vaping Use   • Vaping Use: Never used   Substance and Sexual Activity   • Alcohol use: Never     Comment: Does not drink   • Drug use: Never   • Sexual activity: Defer        Past Medical History:   Diagnosis Date   • Asthma    • Burning mouth syndrome    • Chronic  kidney disease    • COPD (chronic obstructive pulmonary disease)    • Depression    • Diastolic CHF 6/24/2021   • Essential hypertension 6/20/2012   • GERD (gastroesophageal reflux disease)    • Hyperlipidemia    • Oral lesion    • Paroxysmal atrial fibrillation 05/24/2021        Immunization History   Administered Date(s) Administered   • COVID-19 (MODERNA) 03/02/2021, 04/02/2021, 05/02/2021   • Flu Vaccine Quad PF >36MO 09/25/2014   • Flu Vaccine Split Quad 09/25/2014   • Fluad Quad 65+ 09/14/2020   • Fluzone High Dose =>65 Years ([employer] ONLY) 12/01/2015, 10/24/2016, 09/26/2017, 09/28/2018, 10/11/2019   • Influenza TIV (IM) 11/22/2005, 10/29/2008, 09/10/2010, 11/10/2011   • Pneumococcal Conjugate 13-Valent (PCV13) 12/01/2015   • Pneumococcal Polysaccharide (PPSV23) 01/26/2017   • Shingrix 07/07/2020, 09/23/2020   • Tdap 07/15/2013       Allergies   Allergen Reactions   • Erythromycin Other (See Comments)     Stroke like   • Mercury Unknown - Low Severity   • Metoclopramide Other (See Comments)     Decreased blood pressure     • Moxifloxacin Hcl Unknown - Low Severity   • Penicillins Unknown - Low Severity          Current Outpatient Medications:   •  acetaminophen (TYLENOL) 500 MG tablet, Take 500 mg by mouth., Disp: , Rfl:   •  albuterol (PROVENTIL) (2.5 MG/3ML) 0.083% nebulizer solution, albuterol sulfate 2.5 mg/3 mL (0.083 %) solution for nebulization, Disp: , Rfl:   •  ALPRAZolam (XANAX) 0.5 MG tablet, alprazolam 0.5 mg oral tablet take 1 tablet by oral route 3 times a day as needed   Active, Disp: , Rfl:   •  amLODIPine (NORVASC) 5 MG tablet, amlodipine 5 mg oral tablet take 1 tablet (5 mg) by oral route once daily   Suspended, Disp: , Rfl:   •  apixaban (ELIQUIS) 5 MG tablet tablet, Take 5 mg by mouth 2 (Two) Times a Day., Disp: , Rfl:   •  atorvastatin (LIPITOR) 80 MG tablet, Take 80 mg by mouth Daily., Disp: , Rfl:   •  baclofen (LIORESAL) 10 MG tablet, Take 10 mg by mouth 3 (Three) Times a Day., Disp: ,  Rfl:   •  budesonide-formoterol (SYMBICORT) 160-4.5 MCG/ACT inhaler, Inhale 2 puffs 2 (Two) Times a Day., Disp: , Rfl:   •  Cholecalciferol (VITAMIN D3 PO), Take 2,000 Units by mouth Daily., Disp: , Rfl:   •  citalopram (CeleXA) 20 MG tablet, Take 20 mg by mouth Daily., Disp: , Rfl:   •  diphenhydrAMINE (BENADRYL) 25 MG tablet, Take 25 mg by mouth Every 6 (Six) Hours As Needed., Disp: , Rfl:   •  estradiol (ESTRACE) 0.1 MG/GM vaginal cream, estradiol 0.01% (0.1 mg/gram) vaginal cream, Disp: , Rfl:   •  famotidine (PEPCID) 20 MG tablet, Take 20 mg by mouth every night at bedtime., Disp: , Rfl:   •  flecainide (TAMBOCOR) 50 MG tablet, Take 50 mg by mouth 2 (Two) Times a Day., Disp: , Rfl:   •  FORMOTEROL FUMARATE IN, Inhale., Disp: , Rfl:   •  furosemide (LASIX) 20 MG tablet, furosemide 20 mg tablet, Disp: , Rfl:   •  gabapentin (NEURONTIN) 300 MG capsule, Take 300 mg by mouth Daily., Disp: , Rfl:   •  hydroCHLOROthiazide (HYDRODIURIL) 25 MG tablet, Take 25 mg by mouth Daily., Disp: , Rfl:   •  HYDROcodone-acetaminophen (NORCO)  MG per tablet, hydrocodone 10 mg-acetaminophen 325 mg tablet, Disp: , Rfl:   •  hydrOXYzine (ATARAX) 25 MG tablet, hydroxyzine HCl 25 mg tablet, Disp: , Rfl:   •  Lactobacillus (FLORAJEN ACIDOPHILUS PO), Take 460 mg by mouth Daily., Disp: , Rfl:   •  metoprolol succinate XL (TOPROL-XL) 25 MG 24 hr tablet, 1/2 tab qd, Disp: 45 tablet, Rfl: 3  •  mirtazapine (REMERON) 7.5 MG half tablet, Take 15 mg by mouth Every Night., Disp: , Rfl:   •  multivitamin (THERAGRAN) tablet tablet, Take  by mouth., Disp: , Rfl:   •  pantoprazole (PROTONIX) 40 MG EC tablet, Take 40 mg by mouth Daily., Disp: , Rfl:   •  potassium chloride (K-DUR,KLOR-CON) 20 MEQ CR tablet, potassium chloride ER 20 mEq tablet,extended release, Disp: , Rfl:   •  PROBIOTIC PRODUCT PO, Take  by mouth., Disp: , Rfl:   •  prochlorperazine (COMPAZINE) 5 MG tablet, prochlorperazine maleate 5 mg oral tablet take 1 tablet (5 mg) by oral  "route 4 times per day   Active, Disp: , Rfl:   •  promethazine (PHENERGAN) 25 MG tablet, promethazine 25 mg oral tablet take 1 tablet (25 mg) by oral route every 6 hours as needed   Active, Disp: , Rfl:   •  spironolactone-hydrochlorothiazide (ALDACTAZIDE) 25-25 MG tablet, spironolactone 25 mg-hydrochlorothiazide 25 mg tablet, Disp: , Rfl:   •  tamsulosin (FLOMAX) 0.4 MG capsule 24 hr capsule, tamsulosin 0.4 mg oral capsule,extended release 24hr take 1 capsule (0.4 mg) by oral route once daily 1/2 hour following the same meal each day   Suspended, Disp: , Rfl:   •  Zoster Vac Recomb Adjuvanted (Shingrix) 50 MCG/0.5ML reconstituted suspension, Shingrix (PF) 50 mcg/0.5 mL intramuscular suspension, kit, Disp: , Rfl:      Objective     Physical Exam  Vital Signs Reviewed  WDWN, Alert, NAD.    HEENT:  PERRL, EOMI.  OP, nares clear, no sinus tenderness  Neck:  Supple, no JVD, no thyromegaly  Lymph: no axillary, cervical, supraclavicular lymphadenopathy noted bilaterally  Chest:  good aeration, clear to auscultation bilaterally, tympanic to percussion bilaterally, no work of breathing noted  CV: RRR, no MGR, pulses 2+, equal.  Abd:  Soft, NT, ND, + BS, no HSM  EXT:  no clubbing, no cyanosis, no edema, no joint tenderness  Neuro:  A&Ox3, CN grossly intact, no focal deficits.  Skin: No rashes or lesions noted.    Vital Signs:   /63 (BP Location: Right arm, Patient Position: Sitting, Cuff Size: Large Adult)   Pulse 57   Temp 97.5 °F (36.4 °C) (Oral)   Resp 17   Ht 172.7 cm (67.99\")   Wt 123 kg (271 lb)   SpO2 93% Comment: room air  BMI 41.22 kg/m²         Result Review :   I have personally reviewed Dr. Byrd's last office visit note.  I also reviewed pulmonary function test report completed on 1/11/2021.  Scanned report.           Assessment and Plan      Assessment:  1. Dyspnea.      2. Emphysema seen on CT scan.      3. Atrial fibrillation. Patient under the care of Dr. Manzo.    4.  Tobacco abuse of " cigarettes in remission.            Plan:  1.  Feno/exhaled nitric oxide testing was performed in the office today with a level of 26 which is improved from a level of 42 on prior visit consistent with eosinophilic airway inflammation.  2.  Patient's pulmonary function test that showed mild decrease diffusion capacity otherwise unremarkable and did not show much improved with bronchodilators.  Recommended methacholine challenge test however patient declines further testing at this time.  3.  We will order CBC and IgE level.  4.  Patient to continue Symbicort 2 puffs twice a day as prescribed and rinse her mouth out after each use.  5.  Patient continue albuterol nebulizer treatments as needed.  6.  Patient to follow-up with Dr. Manzo as scheduled.  7.  Patient reports they are up-to-date with flu, pneumonia, and Covid vaccines.  Patient is advised to continue to follow CDC recommendations such as social distancing wearing a mask and washing hands for at least 20 seconds.  8.  Patient to call the office, 911, or go to the ER with new or worsening symptoms.  9.  Follow-up with Dr. Reynolds in 1 month, sooner if needed.      Follow Up   Return in about 1 month (around 9/12/2021) for with Dr. Reynolds. Pt to see him. .  Patient was given instructions and counseling regarding her condition or for health maintenance advice. Please see specific information pulled into the AVS if appropriate.

## 2021-08-12 ENCOUNTER — OFFICE VISIT (OUTPATIENT)
Dept: PULMONOLOGY | Facility: CLINIC | Age: 71
End: 2021-08-12

## 2021-08-12 VITALS
TEMPERATURE: 97.5 F | OXYGEN SATURATION: 93 % | HEART RATE: 57 BPM | DIASTOLIC BLOOD PRESSURE: 63 MMHG | RESPIRATION RATE: 17 BRPM | WEIGHT: 271 LBS | BODY MASS INDEX: 41.07 KG/M2 | HEIGHT: 68 IN | SYSTOLIC BLOOD PRESSURE: 128 MMHG

## 2021-08-12 DIAGNOSIS — J43.9 PULMONARY EMPHYSEMA, UNSPECIFIED EMPHYSEMA TYPE (HCC): Primary | ICD-10-CM

## 2021-08-12 DIAGNOSIS — F17.201 TOBACCO ABUSE, IN REMISSION: ICD-10-CM

## 2021-08-12 DIAGNOSIS — I48.91 ATRIAL FIBRILLATION, UNSPECIFIED TYPE (HCC): ICD-10-CM

## 2021-08-12 DIAGNOSIS — R06.00 DYSPNEA, UNSPECIFIED TYPE: ICD-10-CM

## 2021-08-12 LAB — EXHALED NITROUS OXIDE: 26

## 2021-08-12 PROCEDURE — 95012 NITRIC OXIDE EXP GAS DETER: CPT | Performed by: NURSE PRACTITIONER

## 2021-08-12 PROCEDURE — 99214 OFFICE O/P EST MOD 30 MIN: CPT | Performed by: NURSE PRACTITIONER

## 2021-08-12 RX ORDER — NITROFURANTOIN 25; 75 MG/1; MG/1
CAPSULE ORAL
COMMUNITY
Start: 2021-07-06 | End: 2021-08-12

## 2021-08-12 RX ORDER — OMEPRAZOLE 40 MG/1
CAPSULE, DELAYED RELEASE ORAL
COMMUNITY
End: 2021-08-12

## 2021-08-12 RX ORDER — PROMETHAZINE HYDROCHLORIDE 25 MG/1
TABLET ORAL
COMMUNITY

## 2021-08-12 RX ORDER — AMLODIPINE BESYLATE 5 MG/1
TABLET ORAL
COMMUNITY
End: 2022-01-06

## 2021-08-12 RX ORDER — TAMSULOSIN HYDROCHLORIDE 0.4 MG/1
CAPSULE ORAL
COMMUNITY
End: 2022-01-06

## 2021-08-12 RX ORDER — ALBUTEROL SULFATE 2.5 MG/3ML
SOLUTION RESPIRATORY (INHALATION)
COMMUNITY
End: 2022-01-06

## 2021-08-12 RX ORDER — FAMOTIDINE 20 MG/1
20 TABLET, FILM COATED ORAL
COMMUNITY
Start: 2021-07-16

## 2021-08-12 RX ORDER — HYDROXYZINE HYDROCHLORIDE 25 MG/1
TABLET, FILM COATED ORAL
COMMUNITY

## 2021-08-12 RX ORDER — LANSOPRAZOLE 30 MG/1
CAPSULE, DELAYED RELEASE ORAL
COMMUNITY
End: 2021-08-12

## 2021-08-12 RX ORDER — FLECAINIDE ACETATE 50 MG/1
50 TABLET ORAL 2 TIMES DAILY
COMMUNITY
Start: 2021-08-02

## 2021-08-12 RX ORDER — FORMOTEROL FUMARATE DIHYDRATE 20 UG/2ML
SOLUTION RESPIRATORY (INHALATION)
COMMUNITY
Start: 2021-07-17 | End: 2021-08-12

## 2021-08-12 RX ORDER — PROCHLORPERAZINE MALEATE 5 MG/1
TABLET ORAL
COMMUNITY

## 2021-08-12 RX ORDER — SPIRONOLACTONE AND HYDROCHLOROTHIAZIDE 25; 25 MG/1; MG/1
TABLET ORAL
COMMUNITY

## 2021-08-12 RX ORDER — REVEFENACIN 175 UG/3ML
SOLUTION RESPIRATORY (INHALATION)
COMMUNITY
End: 2021-08-12

## 2021-08-12 RX ORDER — ZOSTER VACCINE RECOMBINANT, ADJUVANTED 50 MCG/0.5
KIT INTRAMUSCULAR
COMMUNITY

## 2021-08-12 RX ORDER — DIPHENOXYLATE HYDROCHLORIDE AND ATROPINE SULFATE 2.5; .025 MG/1; MG/1
TABLET ORAL
COMMUNITY

## 2021-08-12 RX ORDER — POTASSIUM CHLORIDE 20 MEQ/1
TABLET, EXTENDED RELEASE ORAL
COMMUNITY

## 2021-08-12 RX ORDER — ALPRAZOLAM 0.5 MG/1
TABLET ORAL
COMMUNITY
End: 2022-01-06

## 2021-08-12 NOTE — PATIENT INSTRUCTIONS
Asthma, Adult    Asthma is a long-term (chronic) condition in which the airways get tight and narrow. The airways are the breathing passages that lead from the nose and mouth down into the lungs. A person with asthma will have times when symptoms get worse. These are called asthma attacks. They can cause coughing, whistling sounds when you breathe (wheezing), shortness of breath, and chest pain. They can make it hard to breathe. There is no cure for asthma, but medicines and lifestyle changes can help control it.  There are many things that can bring on an asthma attack or make asthma symptoms worse (triggers). Common triggers include:  · Mold.  · Dust.  · Cigarette smoke.  · Cockroaches.  · Things that can cause allergy symptoms (allergens). These include animal skin flakes (dander) and pollen from trees or grass.  · Things that pollute the air. These may include household , wood smoke, smog, or chemical odors.  · Cold air, weather changes, and wind.  · Crying or laughing hard.  · Stress.  · Certain medicines or drugs.  · Certain foods such as dried fruit, potato chips, and grape juice.  · Infections, such as a cold or the flu.  · Certain medical conditions or diseases.  · Exercise or tiring activities.  Asthma may be treated with medicines and by staying away from the things that cause asthma attacks. Types of medicines may include:  · Controller medicines. These help prevent asthma symptoms. They are usually taken every day.  · Fast-acting reliever or rescue medicines. These quickly relieve asthma symptoms. They are used as needed and provide short-term relief.  · Allergy medicines if your attacks are brought on by allergens.  · Medicines to help control the body's defense (immune) system.  Follow these instructions at home:  Avoiding triggers in your home  · Change your heating and air conditioning filter often.  · Limit your use of fireplaces and wood stoves.  · Get rid of pests (such as roaches and  mice) and their droppings.  · Throw away plants if you see mold on them.  · Clean your floors. Dust regularly. Use cleaning products that do not smell.  · Have someone vacuum when you are not home. Use a vacuum  with a HEPA filter if possible.  · Replace carpet with wood, tile, or vinyl syd. Carpet can trap animal skin flakes and dust.  · Use allergy-proof pillows, mattress covers, and box spring covers.  · Wash bed sheets and blankets every week in hot water. Dry them in a dryer.  · Keep your bedroom free of any triggers.  · Avoid pets and keep windows closed when things that cause allergy symptoms are in the air.  · Use blankets that are made of polyester or cotton.  · Clean bathrooms and jorden with bleach. If possible, have someone repaint the walls in these rooms with mold-resistant paint. Keep out of the rooms that are being cleaned and painted.  · Wash your hands often with soap and water. If soap and water are not available, use hand .  · Do not allow anyone to smoke in your home.  General instructions  · Take over-the-counter and prescription medicines only as told by your doctor.  ? Talk with your doctor if you have questions about how or when to take your medicines.  ? Make note if you need to use your medicines more often than usual.  · Do not use any products that contain nicotine or tobacco, such as cigarettes and e-cigarettes. If you need help quitting, ask your doctor.  · Stay away from secondhand smoke.  · Avoid doing things outdoors when allergen counts are high and when air quality is low.  · Wear a ski mask when doing outdoor activities in the winter. The mask should cover your nose and mouth. Exercise indoors on cold days if you can.  · Warm up before you exercise. Take time to cool down after exercise.  · Use a peak flow meter as told by your doctor. A peak flow meter is a tool that measures how well the lungs are working.  · Keep track of the peak flow meter's readings.  Write them down.  · Follow your asthma action plan. This is a written plan for taking care of your asthma and treating your attacks.  · Make sure you get all the shots (vaccines) that your doctor recommends. Ask your doctor about a flu shot and a pneumonia shot.  · Keep all follow-up visits as told by your doctor. This is important.  Contact a doctor if:  · You have wheezing, shortness of breath, or a cough even while taking medicine to prevent attacks.  · The mucus you cough up (sputum) is thicker than usual.  · The mucus you cough up changes from clear or white to yellow, green, gray, or bloody.  · You have problems from the medicine you are taking, such as:  ? A rash.  ? Itching.  ? Swelling.  ? Trouble breathing.  · You need reliever medicines more than 2-3 times a week.  · Your peak flow reading is still at 50-79% of your personal best after following the action plan for 1 hour.  · You have a fever.  Get help right away if:  · You seem to be worse and are not responding to medicine during an asthma attack.  · You are short of breath even at rest.  · You get short of breath when doing very little activity.  · You have trouble eating, drinking, or talking.  · You have chest pain or tightness.  · You have a fast heartbeat.  · Your lips or fingernails start to turn blue.  · You are light-headed or dizzy, or you faint.  · Your peak flow is less than 50% of your personal best.  · You feel too tired to breathe normally.  Summary  · Asthma is a long-term (chronic) condition in which the airways get tight and narrow. An asthma attack can make it hard to breathe.  · Asthma cannot be cured, but medicines and lifestyle changes can help control it.  · Make sure you understand how to avoid triggers and how and when to use your medicines.  This information is not intended to replace advice given to you by your health care provider. Make sure you discuss any questions you have with your health care provider.  Document Revised:  02/20/2020 Document Reviewed: 01/22/2018  Elsevier Patient Education © 2021 [employer]

## 2021-08-17 RX ORDER — PANTOPRAZOLE SODIUM 40 MG/1
TABLET, DELAYED RELEASE ORAL
Qty: 90 TABLET | Refills: 1 | Status: SHIPPED | OUTPATIENT
Start: 2021-08-17 | End: 2021-12-09 | Stop reason: SDUPTHER

## 2021-10-18 DIAGNOSIS — J43.9 PULMONARY EMPHYSEMA, UNSPECIFIED EMPHYSEMA TYPE (HCC): Primary | ICD-10-CM

## 2021-10-18 RX ORDER — BUDESONIDE AND FORMOTEROL FUMARATE DIHYDRATE 160; 4.5 UG/1; UG/1
2 AEROSOL RESPIRATORY (INHALATION)
Qty: 3 EACH | Refills: 3 | Status: SHIPPED | OUTPATIENT
Start: 2021-10-18 | End: 2022-01-06

## 2021-12-09 RX ORDER — PANTOPRAZOLE SODIUM 40 MG/1
40 TABLET, DELAYED RELEASE ORAL DAILY
Qty: 90 TABLET | Refills: 1 | Status: SHIPPED | OUTPATIENT
Start: 2021-12-09 | End: 2022-01-06 | Stop reason: SDUPTHER

## 2021-12-15 ENCOUNTER — OFFICE VISIT (OUTPATIENT)
Dept: PULMONOLOGY | Facility: CLINIC | Age: 71
End: 2021-12-15

## 2021-12-15 VITALS
SYSTOLIC BLOOD PRESSURE: 120 MMHG | TEMPERATURE: 98 F | HEIGHT: 68 IN | BODY MASS INDEX: 41.07 KG/M2 | WEIGHT: 271 LBS | RESPIRATION RATE: 14 BRPM | OXYGEN SATURATION: 94 % | HEART RATE: 62 BPM | DIASTOLIC BLOOD PRESSURE: 64 MMHG

## 2021-12-15 DIAGNOSIS — J43.2 CENTRILOBULAR EMPHYSEMA (HCC): ICD-10-CM

## 2021-12-15 DIAGNOSIS — R06.00 DYSPNEA, UNSPECIFIED TYPE: ICD-10-CM

## 2021-12-15 DIAGNOSIS — G47.10 EXCESSIVE SLEEPINESS: Primary | ICD-10-CM

## 2021-12-15 PROCEDURE — 99213 OFFICE O/P EST LOW 20 MIN: CPT | Performed by: INTERNAL MEDICINE

## 2021-12-15 RX ORDER — AZITHROMYCIN 250 MG/1
TABLET, FILM COATED ORAL
COMMUNITY
Start: 2021-11-09 | End: 2022-12-05

## 2021-12-15 RX ORDER — TRAZODONE HYDROCHLORIDE 50 MG/1
TABLET ORAL
COMMUNITY

## 2021-12-15 RX ORDER — POTASSIUM CHLORIDE 750 MG/1
TABLET, FILM COATED, EXTENDED RELEASE ORAL
COMMUNITY
Start: 2021-08-07 | End: 2022-01-06

## 2021-12-15 RX ORDER — SULFAMETHOXAZOLE AND TRIMETHOPRIM 800; 160 MG/1; MG/1
TABLET ORAL
COMMUNITY
Start: 2021-10-15 | End: 2022-07-06

## 2021-12-15 RX ORDER — SPIRONOLACTONE 25 MG/1
12.5 TABLET ORAL DAILY
COMMUNITY
Start: 2021-10-05 | End: 2022-07-06

## 2021-12-15 RX ORDER — BUDESONIDE 0.5 MG/2ML
INHALANT ORAL
COMMUNITY
Start: 2021-07-17 | End: 2022-01-06

## 2021-12-15 RX ORDER — SUCRALFATE 1 G/1
TABLET ORAL
COMMUNITY
End: 2022-07-06

## 2021-12-15 RX ORDER — FORMOTEROL FUMARATE 20 UG/2ML
SOLUTION RESPIRATORY (INHALATION)
COMMUNITY
Start: 2021-07-17 | End: 2022-01-06

## 2021-12-15 NOTE — ASSESSMENT & PLAN NOTE
Emphysema noted on CT scan however patient does not have obstructive defect on PFTs    At this time we will try to wean patient off bronchodilator therapies that she is not noticing any significant improvement her symptoms    Does likely have asthma overlap as well given her elevated Heath    If she does not notice any change in her breathing after being off the medications for 2 weeks I told her to continue staying off of the inhalers

## 2021-12-15 NOTE — PROGRESS NOTES
"Chief Complaint  Follow-up ( 1mo f/u), Emphysema, Shortness of Breath, Cough, Wheezing, Congestive Heart Failure, Atrial Fibrillation, Asthma, and Sleep Apnea    Subjective     {Problem List  Visit Diagnosis   Encounters  Notes  Medications  Labs  Result Review Imaging  Media :23}     Nica Traylor presents to [facility]  History of Present Illness  71-year-old female with emphysema without obstruction here for follow-up  Does have emphysema seen on CT scan  Takes Symbicort but does not notice any significant improvement in symptoms  Feels most of her shortness of breath is due to her heart failure  Is having issues with atrial fibrillation  /A. fib heart failure clinic wants her to be tested for sleep apnea again  Patient had sleep study 2 years ago showing no sleep apnea  Objective   Vital Signs:   /64 (BP Location: Right arm, Patient Position: Sitting, Cuff Size: Large Adult)   Pulse 62   Temp 98 °F (36.7 °C)   Resp 14   Ht 172.7 cm (67.99\")   Wt 123 kg (271 lb)   SpO2 94%   BMI 41.22 kg/m²     Physical Exam   Vital Signs Reviewed  General WDWN, Alert, NAD.    HEENT:  PERRL, EOMI.  OP, nares clear, no sinus tenderness  Neck:  Supple, no JVD, no thyromegaly  Lymph: no axillary, cervical, supraclavicular lymphadenopathy noted bilaterally  Chest:  good aeration, clear to auscultation bilaterally, tympanic to percussion bilaterally, no work of breathing noted  CV: Irregular, no MGR, pulses 2+, equal.  Abd:  Soft, NT, ND, + BS, no HSM  EXT:  no clubbing, no cyanosis, no edema, no joint tenderness  Neuro:  A&Ox3, CN grossly intact, no focal deficits.  Skin: No rashes or lesions noted    Result Review :                 Assessment and Plan    Diagnoses and all orders for this visit:    1. Excessive sleepiness (Primary)  Assessment & Plan:  Patient's being seen by heart pressure specialist in [place] had a negative sleep study 2 years " ago    Requesting given her high stop bang score    Orders:  -     Polysomnography; Future    2. Centrilobular emphysema (HCC)  Assessment & Plan:  Emphysema noted on CT scan however patient does not have obstructive defect on PFTs    At this time we will try to wean patient off bronchodilator therapies that she is not noticing any significant improvement her symptoms    Does likely have asthma overlap as well given her elevated Heath    If she does not notice any change in her breathing after being off the medications for 2 weeks I told her to continue staying off of the inhalers        3. Dyspnea, unspecified type  Assessment & Plan:  Likely multifactorial secondary to emphysema and congestive heart failure today        Follow Up   Return in about 3 months (around 3/15/2022).  Patient was given instructions and counseling regarding her condition or for health maintenance advice. Please see specific information pulled into the AVS if appropriate.

## 2021-12-15 NOTE — ASSESSMENT & PLAN NOTE
Patient's being seen by heart pressure specialist in [place] had a negative sleep study 2 years ago    Requesting given her high stop bang score

## 2021-12-25 ENCOUNTER — HOSPITAL ENCOUNTER (EMERGENCY)
Facility: HOSPITAL | Age: 71
Discharge: HOME | End: 2021-12-25
Attending: EMERGENCY MEDICINE | Admitting: EMERGENCY MEDICINE

## 2021-12-25 ENCOUNTER — APPOINTMENT (OUTPATIENT)
Dept: GENERAL RADIOLOGY | Facility: HOSPITAL | Age: 71
End: 2021-12-25

## 2021-12-25 VITALS
DIASTOLIC BLOOD PRESSURE: 71 MMHG | RESPIRATION RATE: 17 BRPM | WEIGHT: 270.28 LBS | OXYGEN SATURATION: 97 % | HEIGHT: 68 IN | HEART RATE: 66 BPM | SYSTOLIC BLOOD PRESSURE: 125 MMHG | TEMPERATURE: 98.7 F | BODY MASS INDEX: 40.96 KG/M2

## 2021-12-25 DIAGNOSIS — R31.21 ASYMPTOMATIC MICROSCOPIC HEMATURIA: Primary | ICD-10-CM

## 2021-12-25 DIAGNOSIS — K59.00 CONSTIPATION, UNSPECIFIED CONSTIPATION TYPE: ICD-10-CM

## 2021-12-25 LAB
ALBUMIN SERPL-MCNC: 4.7 G/DL (ref 3.5–5.2)
ALBUMIN/GLOB SERPL: 1.6 G/DL
ALP SERPL-CCNC: 90 U/L (ref 39–117)
ALT SERPL W P-5'-P-CCNC: 18 U/L (ref 1–33)
ANION GAP SERPL CALCULATED.3IONS-SCNC: 13.9 MMOL/L (ref 5–15)
AST SERPL-CCNC: 24 U/L (ref 1–32)
BACTERIA UR QL AUTO: ABNORMAL /HPF
BASOPHILS # BLD AUTO: 0.06 10*3/MM3 (ref 0–0.2)
BASOPHILS NFR BLD AUTO: 0.4 % (ref 0–1.5)
BILIRUB SERPL-MCNC: 0.7 MG/DL (ref 0–1.2)
BILIRUB UR QL STRIP: NEGATIVE
BUN SERPL-MCNC: 28 MG/DL (ref 8–23)
BUN/CREAT SERPL: 16.1 (ref 7–25)
CALCIUM SPEC-SCNC: 10.1 MG/DL (ref 8.6–10.5)
CHLORIDE SERPL-SCNC: 95 MMOL/L (ref 98–107)
CLARITY UR: CLEAR
CO2 SERPL-SCNC: 27.1 MMOL/L (ref 22–29)
COLOR UR: YELLOW
CREAT SERPL-MCNC: 1.74 MG/DL (ref 0.57–1)
DEPRECATED RDW RBC AUTO: 42.5 FL (ref 37–54)
EOSINOPHIL # BLD AUTO: 0.07 10*3/MM3 (ref 0–0.4)
EOSINOPHIL NFR BLD AUTO: 0.5 % (ref 0.3–6.2)
ERYTHROCYTE [DISTWIDTH] IN BLOOD BY AUTOMATED COUNT: 15.3 % (ref 12.3–15.4)
GFR SERPL CREATININE-BSD FRML MDRD: 29 ML/MIN/1.73
GLOBULIN UR ELPH-MCNC: 2.9 GM/DL
GLUCOSE SERPL-MCNC: 116 MG/DL (ref 65–99)
GLUCOSE UR STRIP-MCNC: NEGATIVE MG/DL
HCT VFR BLD AUTO: 35 % (ref 34–46.6)
HGB BLD-MCNC: 11.9 G/DL (ref 12–15.9)
HGB UR QL STRIP.AUTO: ABNORMAL
HOLD SPECIMEN: NORMAL
HOLD SPECIMEN: NORMAL
HYALINE CASTS UR QL AUTO: ABNORMAL /LPF
IMM GRANULOCYTES # BLD AUTO: 0.1 10*3/MM3 (ref 0–0.05)
IMM GRANULOCYTES NFR BLD AUTO: 0.7 % (ref 0–0.5)
KETONES UR QL STRIP: NEGATIVE
LEUKOCYTE ESTERASE UR QL STRIP.AUTO: ABNORMAL
LIPASE SERPL-CCNC: 68 U/L (ref 13–60)
LYMPHOCYTES # BLD AUTO: 2.43 10*3/MM3 (ref 0.7–3.1)
LYMPHOCYTES NFR BLD AUTO: 16.3 % (ref 19.6–45.3)
MCH RBC QN AUTO: 26.6 PG (ref 26.6–33)
MCHC RBC AUTO-ENTMCNC: 34 G/DL (ref 31.5–35.7)
MCV RBC AUTO: 78.1 FL (ref 79–97)
MONOCYTES # BLD AUTO: 1.33 10*3/MM3 (ref 0.1–0.9)
MONOCYTES NFR BLD AUTO: 8.9 % (ref 5–12)
NEUTROPHILS NFR BLD AUTO: 10.9 10*3/MM3 (ref 1.7–7)
NEUTROPHILS NFR BLD AUTO: 73.2 % (ref 42.7–76)
NITRITE UR QL STRIP: NEGATIVE
NRBC BLD AUTO-RTO: 0 /100 WBC (ref 0–0.2)
NT-PROBNP SERPL-MCNC: 125.7 PG/ML (ref 0–900)
PH UR STRIP.AUTO: 7.5 [PH] (ref 5–8)
PLATELET # BLD AUTO: 375 10*3/MM3 (ref 140–450)
PMV BLD AUTO: 9.9 FL (ref 6–12)
POTASSIUM SERPL-SCNC: 4.1 MMOL/L (ref 3.5–5.2)
PROT SERPL-MCNC: 7.6 G/DL (ref 6–8.5)
PROT UR QL STRIP: NEGATIVE
RBC # BLD AUTO: 4.48 10*6/MM3 (ref 3.77–5.28)
RBC # UR STRIP: ABNORMAL /HPF
REF LAB TEST METHOD: ABNORMAL
SODIUM SERPL-SCNC: 136 MMOL/L (ref 136–145)
SP GR UR STRIP: 1.01 (ref 1–1.03)
SQUAMOUS #/AREA URNS HPF: ABNORMAL /HPF
UROBILINOGEN UR QL STRIP: ABNORMAL
WBC # UR STRIP: ABNORMAL /HPF
WBC NRBC COR # BLD: 14.89 10*3/MM3 (ref 3.4–10.8)
WHOLE BLOOD HOLD SPECIMEN: NORMAL
WHOLE BLOOD HOLD SPECIMEN: NORMAL

## 2021-12-25 PROCEDURE — 36415 COLL VENOUS BLD VENIPUNCTURE: CPT

## 2021-12-25 PROCEDURE — 83880 ASSAY OF NATRIURETIC PEPTIDE: CPT | Performed by: NURSE PRACTITIONER

## 2021-12-25 PROCEDURE — 80053 COMPREHEN METABOLIC PANEL: CPT

## 2021-12-25 PROCEDURE — 81001 URINALYSIS AUTO W/SCOPE: CPT | Performed by: EMERGENCY MEDICINE

## 2021-12-25 PROCEDURE — 83690 ASSAY OF LIPASE: CPT

## 2021-12-25 PROCEDURE — 99283 EMERGENCY DEPT VISIT LOW MDM: CPT

## 2021-12-25 PROCEDURE — 74019 RADEX ABDOMEN 2 VIEWS: CPT

## 2021-12-25 PROCEDURE — 85025 COMPLETE CBC W/AUTO DIFF WBC: CPT

## 2021-12-25 PROCEDURE — 87086 URINE CULTURE/COLONY COUNT: CPT | Performed by: EMERGENCY MEDICINE

## 2021-12-25 RX ORDER — SODIUM CHLORIDE 0.9 % (FLUSH) 0.9 %
10 SYRINGE (ML) INJECTION AS NEEDED
Status: DISCONTINUED | OUTPATIENT
Start: 2021-12-25 | End: 2021-12-25 | Stop reason: HOSPADM

## 2021-12-25 RX ADMIN — SODIUM CHLORIDE 1000 ML: 9 INJECTION, SOLUTION INTRAVENOUS at 18:56

## 2021-12-25 NOTE — ED PROVIDER NOTES
Subjective   Patient reports that she has been having constipation over the past month which seems to be getting worse and she has taken multiple over-the-counter medicines including magnesium citrate without much output.  She has had abdominal bloating and pain.  She also reports that when she tries to have a bowel movement she becomes numb and tingly all over and breaks out in a sweat.      History provided by:  Patient   used: No        Review of Systems   Constitutional: Positive for diaphoresis. Negative for chills and fever.   HENT: Negative for congestion, ear pain, rhinorrhea and sore throat.    Eyes: Negative for pain.   Respiratory: Negative for cough and shortness of breath.    Cardiovascular: Negative for chest pain.   Gastrointestinal: Positive for abdominal pain, constipation and nausea. Negative for diarrhea and vomiting.   Genitourinary: Negative for decreased urine volume, dysuria and flank pain.   Musculoskeletal: Negative for arthralgias and myalgias.   Skin: Negative for rash.   Neurological: Negative for seizures and headaches.   All other systems reviewed and are negative.      Past Medical History:   Diagnosis Date   • Asthma    • Burning mouth syndrome    • COPD (chronic obstructive pulmonary disease)    • Depression    • Diastolic CHF 6/24/2021   • Essential hypertension 6/20/2012   • GERD (gastroesophageal reflux disease)    • Hyperlipidemia    • Oral lesion    • Paroxysmal atrial fibrillation 05/24/2021       Allergies   Allergen Reactions   • Propafenone Other (See Comments)     Drops B/p   • Erythromycin Other (See Comments)     Stroke like   • Mercury Unknown - Low Severity   • Metoclopramide Other (See Comments)     Decreased blood pressure     • Moxifloxacin Hcl Unknown - Low Severity   • Penicillins Unknown - Low Severity       Past Surgical History:   Procedure Laterality Date   • BLADDER SURGERY     • CARPAL TUNNEL RELEASE     • ENDOSCOPY  2020   • GALLBLADDER  SURGERY  2003   • HYSTERECTOMY         Family History   Problem Relation Age of Onset   • Stroke Mother    • Heart disease Mother    • Diabetes Mother    • Pancreatic cancer Mother    • Heart disease Father    • Heart disease Sister    • Cervical cancer Sister    • Uterine cancer Sister    • Heart disease Brother    • Stroke Other         Maternal grandparent/Paternal grandparent   • Heart disease Other         Maternal grandparent/Paternal grandparent   • Stroke Maternal Aunt    • Heart disease Maternal Aunt        Social History     Socioeconomic History   • Marital status:    Tobacco Use   • Smoking status: Former Smoker     Packs/day: 0.50     Years: 25.00     Pack years: 12.50     Quit date:      Years since quittin.9   • Smokeless tobacco: Never Used   Vaping Use   • Vaping Use: Never used   Substance and Sexual Activity   • Alcohol use: Never     Comment: Does not drink   • Drug use: Never   • Sexual activity: Defer           Objective   Physical Exam  Vitals and nursing note reviewed.   Constitutional:       General: She is not in acute distress.     Appearance: Normal appearance. She is morbidly obese. She is not ill-appearing, toxic-appearing or diaphoretic.   HENT:      Head: Normocephalic and atraumatic.      Right Ear: External ear normal.      Left Ear: External ear normal.      Mouth/Throat:      Mouth: Mucous membranes are moist.   Eyes:      General: No scleral icterus.     Conjunctiva/sclera: Conjunctivae normal.      Pupils: Pupils are equal, round, and reactive to light.   Cardiovascular:      Rate and Rhythm: Normal rate and regular rhythm.      Heart sounds: Normal heart sounds.   Pulmonary:      Effort: Pulmonary effort is normal. No respiratory distress.      Breath sounds: Normal breath sounds.   Abdominal:      General: Bowel sounds are normal. There is distension.      Palpations: Abdomen is soft.      Tenderness: There is abdominal tenderness.   Musculoskeletal:          General: No swelling, tenderness, deformity or signs of injury. Normal range of motion.      Cervical back: Normal range of motion and neck supple.   Skin:     General: Skin is warm and dry.      Capillary Refill: Capillary refill takes less than 2 seconds.   Neurological:      General: No focal deficit present.      Mental Status: She is alert and oriented to person, place, and time.   Psychiatric:         Mood and Affect: Mood normal.         Behavior: Behavior normal.         Procedures           ED Course                                                 MDM  Number of Diagnoses or Management Options  Asymptomatic microscopic hematuria: new and requires workup  Constipation, unspecified constipation type: new and requires workup     Amount and/or Complexity of Data Reviewed  Clinical lab tests: reviewed and ordered  Tests in the radiology section of CPT®: reviewed and ordered  Decide to obtain previous medical records or to obtain history from someone other than the patient: yes    Patient Progress  Patient progress: stable      Final diagnoses:   Asymptomatic microscopic hematuria   Constipation, unspecified constipation type       ED Disposition  ED Disposition     ED Disposition Condition Comment    Discharge Stable           Wilder Morales MD  [address]  [place] [address]  [phone]               Medication List      No changes were made to your prescriptions during this visit.          Nisha Perry, APRN  12/27/21 1828

## 2021-12-27 LAB — BACTERIA SPEC AEROBE CULT: NO GROWTH

## 2022-01-06 ENCOUNTER — OFFICE VISIT (OUTPATIENT)
Dept: GASTROENTEROLOGY | Facility: CLINIC | Age: 72
End: 2022-01-06

## 2022-01-06 VITALS
WEIGHT: 264.4 LBS | HEART RATE: 73 BPM | DIASTOLIC BLOOD PRESSURE: 70 MMHG | HEIGHT: 68 IN | BODY MASS INDEX: 40.07 KG/M2 | SYSTOLIC BLOOD PRESSURE: 132 MMHG

## 2022-01-06 DIAGNOSIS — K58.2 IRRITABLE BOWEL SYNDROME WITH BOTH CONSTIPATION AND DIARRHEA: Primary | ICD-10-CM

## 2022-01-06 DIAGNOSIS — K21.9 GASTROESOPHAGEAL REFLUX DISEASE, UNSPECIFIED WHETHER ESOPHAGITIS PRESENT: ICD-10-CM

## 2022-01-06 DIAGNOSIS — K62.89 ANAL OR RECTAL PAIN: ICD-10-CM

## 2022-01-06 PROCEDURE — 99214 OFFICE O/P EST MOD 30 MIN: CPT | Performed by: NURSE PRACTITIONER

## 2022-01-06 RX ORDER — PANTOPRAZOLE SODIUM 40 MG/1
40 TABLET, DELAYED RELEASE ORAL DAILY
Qty: 90 TABLET | Refills: 1 | Status: SHIPPED | OUTPATIENT
Start: 2022-01-06 | End: 2022-07-06

## 2022-01-06 RX ORDER — HYDROCORTISONE 25 MG/G
CREAM TOPICAL 2 TIMES DAILY
Qty: 1 EACH | Refills: 2 | Status: SHIPPED | OUTPATIENT
Start: 2022-01-06 | End: 2022-01-20

## 2022-01-06 NOTE — PROGRESS NOTES
Chief Complaint  Follow-up (ibs)    Nica Traylor is a 71 y.o. female who presents to [facility] for follow-up of GERD, IBS-mixed and mouth pain.    History of present Illness    Reports that she's been experiencing constipation.  Had to be evaluated at the ER due to severe constipation.  She was given an enema with good results.  She also took a bottle of mag. Citrate.  Reports painful bowel movements that have been present for about one month.  Since ER visit, she's having a bowel movement about every other day.   Taking senokot PRN constipation.      She reports that she will sometimes not bright red blood with bowel movements.      Last colonoscopy 9/12/2016 with no polyps.      No improvement in mouth pain with nystatin.  She's avoiding carbonated beverages and spicy foods.     Past Medical History:   Diagnosis Date   • Asthma    • Burning mouth syndrome    • COPD (chronic obstructive pulmonary disease)    • Depression    • Diastolic CHF 6/24/2021   • Essential hypertension 6/20/2012   • GERD (gastroesophageal reflux disease)    • Hyperlipidemia    • Oral lesion    • Paroxysmal atrial fibrillation 05/24/2021       Past Surgical History:   Procedure Laterality Date   • BLADDER SURGERY     • CARPAL TUNNEL RELEASE     • ENDOSCOPY  2020   • GALLBLADDER SURGERY  2003   • HYSTERECTOMY           Current Outpatient Medications:   •  acetaminophen (TYLENOL) 500 MG tablet, Take 500 mg by mouth., Disp: , Rfl:   •  apixaban (ELIQUIS) 5 MG tablet tablet, Take 5 mg by mouth 2 (Two) Times a Day., Disp: , Rfl:   •  atorvastatin (LIPITOR) 80 MG tablet, Take 80 mg by mouth Daily., Disp: , Rfl:   •  azithromycin (ZITHROMAX) 250 MG tablet, , Disp: , Rfl:   •  baclofen (LIORESAL) 10 MG tablet, Take 10 mg by mouth 3 (Three) Times a Day., Disp: , Rfl:   •  Cholecalciferol (VITAMIN D3 PO), Take 2,000 Units by mouth Daily., Disp: , Rfl:   •  citalopram (CeleXA) 20 MG tablet, Take 20 mg by mouth  Daily., Disp: , Rfl:   •  diphenhydrAMINE (BENADRYL) 25 MG tablet, Take 25 mg by mouth Every 6 (Six) Hours As Needed., Disp: , Rfl:   •  estradiol (ESTRACE) 0.1 MG/GM vaginal cream, estradiol 0.01% (0.1 mg/gram) vaginal cream, Disp: , Rfl:   •  famotidine (PEPCID) 20 MG tablet, Take 20 mg by mouth every night at bedtime., Disp: , Rfl:   •  flecainide (TAMBOCOR) 50 MG tablet, Take 50 mg by mouth 2 (Two) Times a Day., Disp: , Rfl:   •  furosemide (LASIX) 20 MG tablet, furosemide 20 mg tablet, Disp: , Rfl:   •  gabapentin (NEURONTIN) 300 MG capsule, Take 300 mg by mouth Daily., Disp: , Rfl:   •  hydroCHLOROthiazide (HYDRODIURIL) 25 MG tablet, Take 25 mg by mouth Daily., Disp: , Rfl:   •  HYDROcodone-acetaminophen (NORCO)  MG per tablet, hydrocodone 10 mg-acetaminophen 325 mg tablet, Disp: , Rfl:   •  hydrOXYzine (ATARAX) 25 MG tablet, hydroxyzine HCl 25 mg tablet, Disp: , Rfl:   •  meclizine (ANTIVERT) 25 MG tablet, Take 1 tablet by mouth 4 (Four) Times a Day As Needed for Dizziness., Disp: 60 tablet, Rfl: 0  •  metoprolol succinate XL (TOPROL-XL) 25 MG 24 hr tablet, 1/2 tab qd, Disp: 45 tablet, Rfl: 3  •  mirtazapine (REMERON) 7.5 MG half tablet, Take 15 mg by mouth Every Night., Disp: , Rfl:   •  multivitamin (THERAGRAN) tablet tablet, Take  by mouth., Disp: , Rfl:   •  ondansetron ODT (ZOFRAN-ODT) 4 MG disintegrating tablet, Place 1 tablet on the tongue 4 (Four) Times a Day As Needed for Nausea., Disp: 30 tablet, Rfl: 0  •  pantoprazole (PROTONIX) 40 MG EC tablet, Take 1 tablet by mouth Daily., Disp: 90 tablet, Rfl: 1  •  potassium chloride (K-DUR,KLOR-CON) 20 MEQ CR tablet, potassium chloride ER 20 mEq tablet,extended release, Disp: , Rfl:   •  PROBIOTIC PRODUCT PO, Take  by mouth., Disp: , Rfl:   •  prochlorperazine (COMPAZINE) 5 MG tablet, prochlorperazine maleate 5 mg oral tablet take 1 tablet (5 mg) by oral route 4 times per day   Active, Disp: , Rfl:   •  promethazine (PHENERGAN) 25 MG tablet,  promethazine 25 mg oral tablet take 1 tablet (25 mg) by oral route every 6 hours as needed   Active, Disp: , Rfl:   •  senna (SENOKOT) 8.6 MG tablet, Take 1 tablet by mouth Daily., Disp: , Rfl:   •  spironolactone (ALDACTONE) 25 MG tablet, Take 12.5 mg by mouth Daily., Disp: , Rfl:   •  spironolactone-hydrochlorothiazide (ALDACTAZIDE) 25-25 MG tablet, spironolactone 25 mg-hydrochlorothiazide 25 mg tablet, Disp: , Rfl:   •  sucralfate (CARAFATE) 1 g tablet, sucralfate 1 gram tablet, Disp: , Rfl:   •  sulfamethoxazole-trimethoprim (BACTRIM DS,SEPTRA DS) 800-160 MG per tablet, , Disp: , Rfl:   •  traZODone (DESYREL) 50 MG tablet, trazodone 50 mg tablet, Disp: , Rfl:   •  Zoster Vac Recomb Adjuvanted (Shingrix) 50 MCG/0.5ML reconstituted suspension, Shingrix (PF) 50 mcg/0.5 mL intramuscular suspension, kit, Disp: , Rfl:   •  Hydrocortisone, Perianal, (ANUSOL-HC) 2.5 % rectal cream, Insert  into the rectum 2 (Two) Times a Day for 14 days., Disp: 1 each, Rfl: 2     Allergies   Allergen Reactions   • Propafenone Other (See Comments)     Drops B/p   • Erythromycin Other (See Comments)     Stroke like   • Mercury Unknown - Low Severity   • Metoclopramide Other (See Comments)     Decreased blood pressure     • Moxifloxacin Hcl Unknown - Low Severity   • Penicillins Unknown - Low Severity       Family History   Problem Relation Age of Onset   • Stroke Mother    • Heart disease Mother    • Diabetes Mother    • Pancreatic cancer Mother    • Heart disease Father    • Heart disease Sister    • Cervical cancer Sister    • Uterine cancer Sister    • Heart disease Brother    • Stroke Other         Maternal grandparent/Paternal grandparent   • Heart disease Other         Maternal grandparent/Paternal grandparent   • Stroke Maternal Aunt    • Heart disease Maternal Aunt         Social History     Social History Narrative    Lives alone.       Objective     Review of Systems     Vital Signs:   /70 (BP Location: Left arm, Patient  "Position: Sitting, Cuff Size: Adult)   Pulse 73   Ht 172.7 cm (68\")   Wt 120 kg (264 lb 6.4 oz)   BMI 40.20 kg/m²       Physical Exam  Constitutional:       General: She is not in acute distress.     Appearance: Normal appearance. She is well-developed and normal weight.   HENT:      Head: Normocephalic and atraumatic.   Eyes:      Conjunctiva/sclera: Conjunctivae normal.      Pupils: Pupils are equal, round, and reactive to light.      Visual Fields: Right eye visual fields normal and left eye visual fields normal.   Cardiovascular:      Rate and Rhythm: Normal rate and regular rhythm.      Heart sounds: Normal heart sounds.   Pulmonary:      Effort: Pulmonary effort is normal. No retractions.      Breath sounds: Normal breath sounds and air entry.   Abdominal:      General: Bowel sounds are normal.      Palpations: Abdomen is soft.      Tenderness: There is no abdominal tenderness.      Comments: No appreciable hepatosplenomegaly or ascites   Musculoskeletal:         General: Normal range of motion.      Cervical back: Neck supple.      Right lower leg: No edema.      Left lower leg: No edema.   Lymphadenopathy:      Cervical: No cervical adenopathy.   Skin:     General: Skin is warm and dry.      Findings: No lesion.   Neurological:      General: No focal deficit present.      Mental Status: She is alert and oriented to person, place, and time.   Psychiatric:         Mood and Affect: Mood and affect normal.         Behavior: Behavior normal.         Result Review :     CBC Auto Diff   WBC   Date Value Ref Range Status   01/03/2022 11.09 (H) 4.5 - 11.0 10*3/uL Final     RBC   Date Value Ref Range Status   01/03/2022 4.53 4.0 - 5.2 10*6/uL Final     Hemoglobin   Date Value Ref Range Status   01/03/2022 11.8 (L) 12.0 - 16.0 g/dL Final     Hematocrit   Date Value Ref Range Status   01/03/2022 37.3 36.0 - 46.0 % Final     MCV   Date Value Ref Range Status   01/03/2022 82.3 80.0 - 100.0 fL Final     MCH   Date " Value Ref Range Status   01/03/2022 26.0 26.0 - 34.0 pg Final     MCHC   Date Value Ref Range Status   01/03/2022 31.6 31.0 - 37.0 g/dL Final     RDW   Date Value Ref Range Status   01/03/2022 15.8 12.0 - 16.8 % Final     RDW-SD   Date Value Ref Range Status   12/25/2021 42.5 37.0 - 54.0 fl Final     MPV   Date Value Ref Range Status   01/03/2022 10.2 8.4 - 12.4 fL Final     Platelets   Date Value Ref Range Status   01/03/2022 361 140 - 440 10*3/uL Final     Neutrophil Rel %   Date Value Ref Range Status   01/03/2022 63.2 45 - 80 % Final     Lymphocyte Rel %   Date Value Ref Range Status   01/03/2022 25.5 15 - 50 % Final     Monocyte Rel %   Date Value Ref Range Status   01/03/2022 9.6 0 - 15 % Final     Eosinophil %   Date Value Ref Range Status   01/03/2022 0.9 0 - 7 % Final     Basophil Rel %   Date Value Ref Range Status   01/03/2022 0.5 0 - 2 % Final     Immature Grans %   Date Value Ref Range Status   01/03/2022 0.3 0.0 - 1.0 % Final     Neutrophils Absolute   Date Value Ref Range Status   01/03/2022 7.01 2.0 - 8.8 10*3/uL Final     Lymphocytes Absolute   Date Value Ref Range Status   01/03/2022 2.83 0.7 - 5.5 10*3/uL Final     Monocytes Absolute   Date Value Ref Range Status   01/03/2022 1.06 0.0 - 1.7 10*3/uL Final     Eosinophils Absolute   Date Value Ref Range Status   01/03/2022 0.10 0.0 - 0.8 10*3/uL Final     Basophils Absolute   Date Value Ref Range Status   01/03/2022 0.06 0.0 - 0.2 10*3/uL Final     Immature Grans, Absolute   Date Value Ref Range Status   01/03/2022 0.03 0.00 - 0.10 10*3/uL Final     nRBC   Date Value Ref Range Status   12/25/2021 0.0 0.0 - 0.2 /100 WBC Final     CMP   Glucose   Date Value Ref Range Status   12/25/2021 116 (H) 65 - 99 mg/dL Final     BUN   Date Value Ref Range Status   12/25/2021 28 (H) 8 - 23 mg/dL Final   10/15/2021 20 7 - 20 mg/dL Final     Creatinine   Date Value Ref Range Status   12/25/2021 1.74 (H) 0.57 - 1.00 mg/dL Final   10/15/2021 1.5 0.7 - 1.5 mg/dL Final      Sodium   Date Value Ref Range Status   12/25/2021 136 136 - 145 mmol/L Final   10/15/2021 139 137 - 145 mmol/L Final     Potassium   Date Value Ref Range Status   12/25/2021 4.1 3.5 - 5.2 mmol/L Final   10/15/2021 4.4 3.5 - 5.1 mmol/L Final     Chloride   Date Value Ref Range Status   12/25/2021 95 (L) 98 - 107 mmol/L Final   10/15/2021 100 98 - 107 mmol/L Final     CO2   Date Value Ref Range Status   12/25/2021 27.1 22.0 - 29.0 mmol/L Final     Total CO2   Date Value Ref Range Status   10/15/2021 26 22 - 30 mmol/L Final     Calcium   Date Value Ref Range Status   12/25/2021 10.1 8.6 - 10.5 mg/dL Final   10/15/2021 10.2 8.4 - 10.2 mg/dL Final     Total Protein   Date Value Ref Range Status   12/25/2021 7.6 6.0 - 8.5 g/dL Final   10/15/2021 7.7 6.3 - 8.2 g/dL Final     Albumin   Date Value Ref Range Status   12/25/2021 4.70 3.50 - 5.20 g/dL Final   10/15/2021 4.6 3.5 - 5.0 g/dL Final     ALT (SGPT)   Date Value Ref Range Status   12/25/2021 18 1 - 33 U/L Final   10/15/2021 20 13 - 69 U/L Final     AST (SGOT)   Date Value Ref Range Status   12/25/2021 24 1 - 32 U/L Final   10/15/2021 33 15 - 46 U/L Final     Alkaline Phosphatase   Date Value Ref Range Status   12/25/2021 90 39 - 117 U/L Final   10/15/2021 92 38 - 126 U/L Final     Total Bilirubin   Date Value Ref Range Status   12/25/2021 0.7 0.0 - 1.2 mg/dL Final   10/15/2021 0.8 0.2 - 1.3 mg/dL Final     eGFR Non  Amer   Date Value Ref Range Status   12/25/2021 29 (L) >60 mL/min/1.73 Final     Globulin   Date Value Ref Range Status   12/25/2021 2.9 gm/dL Final   10/15/2021 3.1 1.5 - 4.5 g/dL Final     A/G Ratio   Date Value Ref Range Status   12/25/2021 1.6 g/dL Final     BUN/Creatinine Ratio   Date Value Ref Range Status   12/25/2021 16.1 7.0 - 25.0 Final   10/15/2021 13.0 10.0 - 20.0 RATIO Final     Anion Gap   Date Value Ref Range Status   12/25/2021 13.9 5.0 - 15.0 mmol/L Final               Assessment and Plan    Diagnoses and all orders for this  visit:    1. Irritable bowel syndrome with both constipation and diarrhea (Primary)    2. Gastroesophageal reflux disease, unspecified whether esophagitis present  -     pantoprazole (PROTONIX) 40 MG EC tablet; Take 1 tablet by mouth Daily.  Dispense: 90 tablet; Refill: 1    3. Anal or rectal pain  -     Hydrocortisone, Perianal, (ANUSOL-HC) 2.5 % rectal cream; Insert  into the rectum 2 (Two) Times a Day for 14 days.  Dispense: 1 each; Refill: 2    Recommend that she begin miralax daily for constipation.  If no improvement in bowel movements in 2 weeks, consider colonoscopy.  Also recommend anusol for rectal pain and probable hemorrhoids.  Continue protonix daily as this is controlled reflux.          Follow Up   Return in about 6 months (around 7/6/2022) for GERD, IBS.  Patient was given instructions and counseling regarding her condition or for health maintenance advice. Please see specific information pulled into the AVS if appropriate.

## 2022-03-14 RX ORDER — APIXABAN 5 MG/1
TABLET, FILM COATED ORAL
Qty: 180 TABLET | Refills: 3 | OUTPATIENT
Start: 2022-03-14

## 2022-03-15 ENCOUNTER — OFFICE VISIT (OUTPATIENT)
Dept: PULMONOLOGY | Facility: CLINIC | Age: 72
End: 2022-03-15

## 2022-03-15 VITALS
HEART RATE: 62 BPM | TEMPERATURE: 98.7 F | DIASTOLIC BLOOD PRESSURE: 68 MMHG | HEIGHT: 68 IN | BODY MASS INDEX: 41.07 KG/M2 | SYSTOLIC BLOOD PRESSURE: 123 MMHG | WEIGHT: 271 LBS | OXYGEN SATURATION: 95 % | RESPIRATION RATE: 16 BRPM

## 2022-03-15 DIAGNOSIS — I48.0 PAROXYSMAL ATRIAL FIBRILLATION: ICD-10-CM

## 2022-03-15 DIAGNOSIS — J43.2 CENTRILOBULAR EMPHYSEMA: Primary | ICD-10-CM

## 2022-03-15 DIAGNOSIS — F17.211 NICOTINE DEPENDENCE, CIGARETTES, IN REMISSION: ICD-10-CM

## 2022-03-15 DIAGNOSIS — I50.32 CHRONIC HEART FAILURE WITH PRESERVED EJECTION FRACTION (HFPEF): ICD-10-CM

## 2022-03-15 DIAGNOSIS — I50.33 ACUTE ON CHRONIC DIASTOLIC CHF (CONGESTIVE HEART FAILURE): ICD-10-CM

## 2022-03-15 DIAGNOSIS — R06.09 DYSPNEA ON EXERTION: ICD-10-CM

## 2022-03-15 DIAGNOSIS — G47.10 EXCESSIVE SLEEPINESS: ICD-10-CM

## 2022-03-15 DIAGNOSIS — E66.01 MORBID OBESITY WITH BMI OF 40.0-44.9, ADULT: ICD-10-CM

## 2022-03-15 PROCEDURE — 99214 OFFICE O/P EST MOD 30 MIN: CPT | Performed by: NURSE PRACTITIONER

## 2022-03-15 RX ORDER — TIOTROPIUM BROMIDE AND OLODATEROL 3.124; 2.736 UG/1; UG/1
2 SPRAY, METERED RESPIRATORY (INHALATION)
Qty: 2 EACH | Refills: 0 | COMMUNITY
Start: 2022-03-15 | End: 2022-04-04 | Stop reason: SDUPTHER

## 2022-03-15 RX ORDER — ALBUTEROL SULFATE 90 UG/1
2 AEROSOL, METERED RESPIRATORY (INHALATION) EVERY 4 HOURS PRN
Qty: 54 G | Refills: 3 | Status: SHIPPED | OUTPATIENT
Start: 2022-03-15

## 2022-03-15 RX ORDER — ACETAMINOPHEN 500 MG
500 TABLET ORAL
COMMUNITY

## 2022-03-15 RX ORDER — MIRTAZAPINE 7.5 MG/1
7.5 TABLET, FILM COATED ORAL
COMMUNITY
Start: 2022-01-17 | End: 2022-07-06

## 2022-03-15 NOTE — PROGRESS NOTES
"Primary Care Provider  Wilder Morales MD     Referring Provider  No ref. provider found     Chief Complaint  Emphysema (3mo f/u), Shortness of Breath (Gets very short of breath, often. While walking or laying down), COPD, and Cough (Some coughing, productive sometimes, normal mucus)    Subjective          Nica Traylor presents to [facility]  History of Present Illness  Nica Traylor is a 71 y.o. female patient of Dr. Reynolds here for management of excessive sleepiness, centrilobular emphysema, back abuse of cigarettes in remission, atrial fibrillation, congestive heart failure, and dyspnea on exertion.     Patient states that she is doing okay since her last visit.  She denies using any antibiotics or steroids for her lungs.  She denies any fevers or chills.  Patient states that her shortness breath is mild to moderate in severity, worse with exertion and improved with rest.  She states that she is able to walk a couple 100 feet before getting short of breath.  She used to take Symbicort, but states that she did not notice a difference in it.  She would like to have a rescue inhaler to have on hand.  She does have a productive cough with \"normal colored phlegm\".  She is scheduled to have a sleep study in April 2022.  Patient's congestive heart failure and atrial fibrillation is being managed by cardiology.  Patient states that she is aware that she is overweight, and she is trying to stay more active.  Patient states that her shortness of breath does affect her ADLs.  She states that she has to take frequent breaks to complete them.  She is up-to-date on her flu, pneumonia, and Covid vaccines.     Her history of smoking is   Tobacco Use: Medium Risk   • Smoking Tobacco Use: Former Smoker   • Smokeless Tobacco Use: Never Used   .    Review of Systems   Constitutional: Negative for chills, fatigue, fever, unexpected weight gain and unexpected weight " loss.   HENT: Negative for congestion (Nasal), hearing loss, mouth sores, nosebleeds, postnasal drip, sore throat and trouble swallowing.    Eyes: Negative for blurred vision and visual disturbance.   Respiratory: Positive for cough and shortness of breath. Negative for apnea and wheezing.         Negative for Hemoptysis     Cardiovascular: Negative for chest pain, palpitations and leg swelling.   Gastrointestinal: Negative for abdominal pain, constipation, diarrhea, nausea, vomiting and GERD.        Negative for Jaundice  Negative for Bloating  Negative for Melena   Musculoskeletal: Negative for joint swelling and myalgias.        Negative for Joint pain  Negative for Joint stiffness   Skin: Negative for color change.        Negative for cyanosis   Neurological: Negative for syncope, weakness, numbness and headache.      Sleep: Negative for Excessive daytime sleepiness  Negative for morning headaches  Negative for Snoring    Family History   Problem Relation Age of Onset   • Stroke Mother    • Heart disease Mother    • Diabetes Mother    • Pancreatic cancer Mother    • Heart disease Father    • Heart disease Sister    • Cervical cancer Sister    • Uterine cancer Sister    • Heart disease Brother    • Stroke Other         Maternal grandparent/Paternal grandparent   • Heart disease Other         Maternal grandparent/Paternal grandparent   • Stroke Maternal Aunt    • Heart disease Maternal Aunt         Social History     Socioeconomic History   • Marital status:    Tobacco Use   • Smoking status: Former Smoker     Packs/day: 0.50     Years: 25.00     Pack years: 12.50     Types: Cigarettes     Quit date: 2016     Years since quittin.2   • Smokeless tobacco: Never Used   Vaping Use   • Vaping Use: Never used   Substance and Sexual Activity   • Alcohol use: Never     Comment: Does not drink   • Drug use: Never   • Sexual activity: Defer        Past Medical History:   Diagnosis Date   • Asthma    • Burning  mouth syndrome    • COPD (chronic obstructive pulmonary disease)    • Depression    • Diastolic CHF 6/24/2021   • Essential hypertension 6/20/2012   • GERD (gastroesophageal reflux disease)    • Hyperlipidemia    • Oral lesion    • Paroxysmal atrial fibrillation 05/24/2021        Immunization History   Administered Date(s) Administered   • COVID-19 (MODERNA) 1st, 2nd, 3rd Dose Only 03/02/2021, 04/02/2021, 05/02/2021, 12/09/2021   • Flu Vaccine Quad PF >36MO 09/25/2014   • Flu Vaccine Split Quad 11/22/2005, 10/29/2008, 09/10/2010, 11/10/2011, 09/25/2014   • FluLaval/Fluarix/Fluzone >6 09/25/2014   • Fluad Quad 65+ 09/14/2020   • Fluzone High Dose =>65 Years ([employer] ONLY) 12/01/2015, 10/24/2016, 09/26/2017, 09/28/2018, 10/11/2019   • Fluzone High-Dose 65+yrs 10/09/2021   • Influenza TIV (IM) 11/22/2005, 10/29/2008, 09/10/2010, 11/10/2011   • Pneumococcal Conjugate 13-Valent (PCV13) 12/01/2015   • Pneumococcal Polysaccharide (PPSV23) 01/26/2017   • Shingrix 07/07/2020, 09/23/2020   • Tdap 07/15/2013         Allergies   Allergen Reactions   • Propafenone Other (See Comments)     Drops B/p   • Erythromycin Other (See Comments)     Stroke like   • Mercury Unknown - Low Severity   • Metoclopramide Other (See Comments)     Decreased blood pressure     • Moxifloxacin Hcl Unknown - Low Severity   • Penicillins Unknown - Low Severity          Current Outpatient Medications:   •  acetaminophen (TYLENOL) 500 MG tablet, Take 500 mg by mouth., Disp: , Rfl:   •  acetaminophen (TYLENOL) 500 MG tablet, Take 500 mg by mouth., Disp: , Rfl:   •  apixaban (ELIQUIS) 5 MG tablet tablet, Take 5 mg by mouth 2 (Two) Times a Day., Disp: , Rfl:   •  atorvastatin (LIPITOR) 80 MG tablet, Take 80 mg by mouth Daily., Disp: , Rfl:   •  baclofen (LIORESAL) 10 MG tablet, Take 10 mg by mouth 3 (Three) Times a Day., Disp: , Rfl:   •  Cholecalciferol (VITAMIN D3 PO), Take 2,000 Units by mouth Daily., Disp: , Rfl:   •  citalopram (CeleXA) 20 MG tablet,  Take 20 mg by mouth Daily., Disp: , Rfl:   •  diphenhydrAMINE (BENADRYL) 25 MG tablet, Take 25 mg by mouth Every 6 (Six) Hours As Needed., Disp: , Rfl:   •  estradiol (ESTRACE) 0.1 MG/GM vaginal cream, estradiol 0.01% (0.1 mg/gram) vaginal cream, Disp: , Rfl:   •  famotidine (PEPCID) 20 MG tablet, Take 20 mg by mouth every night at bedtime., Disp: , Rfl:   •  flecainide (TAMBOCOR) 50 MG tablet, Take 50 mg by mouth 2 (Two) Times a Day., Disp: , Rfl:   •  furosemide (LASIX) 20 MG tablet, furosemide 20 mg tablet, Disp: , Rfl:   •  gabapentin (NEURONTIN) 300 MG capsule, Take 300 mg by mouth Daily., Disp: , Rfl:   •  hydroCHLOROthiazide (HYDRODIURIL) 25 MG tablet, Take 25 mg by mouth Daily., Disp: , Rfl:   •  HYDROcodone-acetaminophen (NORCO)  MG per tablet, hydrocodone 10 mg-acetaminophen 325 mg tablet, Disp: , Rfl:   •  hydrOXYzine (ATARAX) 25 MG tablet, hydroxyzine HCl 25 mg tablet, Disp: , Rfl:   •  meclizine (ANTIVERT) 25 MG tablet, Take 1 tablet by mouth 4 (Four) Times a Day As Needed for Dizziness., Disp: 60 tablet, Rfl: 0  •  metoprolol succinate XL (TOPROL-XL) 25 MG 24 hr tablet, 1/2 tab qd, Disp: 45 tablet, Rfl: 3  •  mirtazapine (REMERON) 7.5 MG half tablet, Take 15 mg by mouth Every Night., Disp: , Rfl:   •  mirtazapine (REMERON) 7.5 MG tablet, Take 7.5 mg by mouth every night at bedtime., Disp: , Rfl:   •  multivitamin (THERAGRAN) tablet tablet, Take  by mouth., Disp: , Rfl:   •  ondansetron ODT (ZOFRAN-ODT) 4 MG disintegrating tablet, Place 1 tablet on the tongue 4 (Four) Times a Day As Needed for Nausea., Disp: 30 tablet, Rfl: 0  •  pantoprazole (PROTONIX) 40 MG EC tablet, Take 1 tablet by mouth Daily., Disp: 90 tablet, Rfl: 1  •  potassium chloride (K-DUR,KLOR-CON) 20 MEQ CR tablet, potassium chloride ER 20 mEq tablet,extended release, Disp: , Rfl:   •  PROBIOTIC PRODUCT PO, Take  by mouth., Disp: , Rfl:   •  prochlorperazine (COMPAZINE) 5 MG tablet, prochlorperazine maleate 5 mg oral tablet take 1  tablet (5 mg) by oral route 4 times per day   Active, Disp: , Rfl:   •  promethazine (PHENERGAN) 25 MG tablet, promethazine 25 mg oral tablet take 1 tablet (25 mg) by oral route every 6 hours as needed   Active, Disp: , Rfl:   •  senna (SENOKOT) 8.6 MG tablet, Take 1 tablet by mouth Daily., Disp: , Rfl:   •  spironolactone (ALDACTONE) 25 MG tablet, Take 12.5 mg by mouth Daily., Disp: , Rfl:   •  spironolactone-hydrochlorothiazide (ALDACTAZIDE) 25-25 MG tablet, spironolactone 25 mg-hydrochlorothiazide 25 mg tablet, Disp: , Rfl:   •  sucralfate (CARAFATE) 1 g tablet, sucralfate 1 gram tablet, Disp: , Rfl:   •  sulfamethoxazole-trimethoprim (BACTRIM DS,SEPTRA DS) 800-160 MG per tablet, , Disp: , Rfl:   •  traZODone (DESYREL) 50 MG tablet, trazodone 50 mg tablet, Disp: , Rfl:   •  albuterol sulfate  (90 Base) MCG/ACT inhaler, Inhale 2 puffs Every 4 (Four) Hours As Needed for Wheezing., Disp: 54 g, Rfl: 3  •  azithromycin (ZITHROMAX) 250 MG tablet, , Disp: , Rfl:   •  tiotropium bromide-olodaterol (Stiolto Respimat) 2.5-2.5 MCG/ACT aerosol solution inhaler, Inhale 2 puffs Daily., Disp: 2 each, Rfl: 0  •  Zoster Vac Recomb Adjuvanted (Shingrix) 50 MCG/0.5ML reconstituted suspension, Shingrix (PF) 50 mcg/0.5 mL intramuscular suspension, kit, Disp: , Rfl:      Objective   Physical Exam  Constitutional:       General: She is not in acute distress.     Appearance: Normal appearance. She is obese.   HENT:      Right Ear: Hearing normal.      Left Ear: Hearing normal.      Nose: No nasal tenderness or congestion.      Mouth/Throat:      Mouth: Mucous membranes are moist. No oral lesions.   Eyes:      Extraocular Movements: Extraocular movements intact.      Pupils: Pupils are equal, round, and reactive to light.   Neck:      Thyroid: No thyroid mass or thyromegaly.   Cardiovascular:      Rate and Rhythm: Normal rate and regular rhythm.      Pulses: Normal pulses.      Heart sounds: Normal heart sounds. No murmur  "heard.  Pulmonary:      Effort: Pulmonary effort is normal.      Breath sounds: Normal breath sounds. No wheezing, rhonchi or rales.   Chest:   Breasts:      Right: No axillary adenopathy.       Abdominal:      General: Bowel sounds are normal. There is no distension.      Palpations: Abdomen is soft.      Tenderness: There is no abdominal tenderness.   Musculoskeletal:      Cervical back: Neck supple.      Right lower leg: No edema.      Left lower leg: No edema.   Lymphadenopathy:      Cervical: No cervical adenopathy.      Upper Body:      Right upper body: No axillary adenopathy.   Skin:     General: Skin is warm and dry.      Findings: No lesion or rash.   Neurological:      General: No focal deficit present.      Mental Status: She is alert and oriented to person, place, and time.      Cranial Nerves: Cranial nerves are intact.   Psychiatric:         Mood and Affect: Affect normal. Mood is not anxious or depressed.         Vital Signs:   /68 (BP Location: Left arm, Patient Position: Sitting, Cuff Size: Adult)   Pulse 62   Temp 98.7 °F (37.1 °C) (Infrared)   Resp 16   Ht 172.7 cm (68\")   Wt 123 kg (271 lb)   SpO2 95% Comment: room air  BMI 41.21 kg/m²        Result Review :     CMP    CMP 8/30/21 10/15/21 12/25/21   Glucose   116 (A)   Glucose 102 (A) 90    BUN 27 (A) 20 28 (A)   Creatinine 1.6 (A) 1.5 1.74 (A)   eGFR Non African Am   29 (A)   Sodium 138 139 136   Potassium 4.4 4.4 4.1   Chloride 97 (A) 100 95 (A)   Calcium 9.9 10.2 10.1   Albumin 4.5 4.6 4.70   Total Bilirubin 0.7 0.8 0.7   Alkaline Phosphatase 90 92 90   AST (SGOT) 31 33 24   ALT (SGPT) 17 20 18   (A) Abnormal value            CBC w/diff    CBC w/Diff 10/15/21 12/25/21 1/3/22   WBC 10.36 14.89 (A) 11.09 (A)   RBC 4.50 4.48 4.53   Hemoglobin 11.9 (A) 11.9 (A) 11.8 (A)   Hematocrit 38.1 35.0 37.3   MCV 84.7 78.1 (A) 82.3   MCH 26.4 26.6 26.0   MCHC 31.2 34.0 31.6   RDW 15.8 15.3 15.8   Platelets 416 375 361   Neutrophil Rel % 63.8 " 73.2 63.2   Immature Granulocyte Rel % 0.5 0.7 (A) 0.3   Lymphocyte Rel % 25.6 16.3 (A) 25.5   Monocyte Rel % 9.1 8.9 9.6   Eosinophil Rel % 0.5 0.5 0.9   Basophil Rel % 0.5 0.4 0.5   (A) Abnormal value            Data reviewed: Radiologic studies Chest CT 1/28/2021, chest x-ray 6/21/2021, pulmonary function test 1/11/2021 and Dr. Reynolds's last office note   Procedures        Assessment and Plan    Diagnoses and all orders for this visit:    1. Centrilobular emphysema (HCC) (Primary)  -     albuterol sulfate  (90 Base) MCG/ACT inhaler; Inhale 2 puffs Every 4 (Four) Hours As Needed for Wheezing.  Dispense: 54 g; Refill: 3  -     tiotropium bromide-olodaterol (Stiolto Respimat) 2.5-2.5 MCG/ACT aerosol solution inhaler; Inhale 2 puffs Daily.  Dispense: 2 each; Refill: 0    2. Excessive sleepiness    3. Nicotine dependence, cigarettes, in remission    4. Dyspnea on exertion    5. Diastolic CHF    6. Morbid obesity with BMI of 40.0-44.9, adult ([facility])    7. Paroxysmal atrial fibrillation (HCC)    8. Chronic heart failure with preserved ejection fraction (HFpEF) ([facility])    9.  Patient to start Stiolto 2 puffs daily.  Was given in office today.  If patient likes this inhaler, she will let her office know to send the prescription to her pharmacy.  10.  Start albuterol as needed for shortness of breath/wheezing.  11.  Continue follow-up with cardiology for management of heart failure and atrial fibrillation.  12.  Patient is scheduled to have a sleep study in April 2022.  Encouraged patient to keep this appointment.  13.  Spent 5 minutes counseling patient on diet and exercise.  Recommended a low-fat, 1800-calorie diet.  Also recommend 30 minutes of daily exercise.  Patient verbalizes understanding.  14.  Follow-up with [facility] in 3 months, sooner if needed.        Follow Up   Return in about 3 months (around 6/15/2022) for Recheck with Spencer.  Patient was given instructions and counseling regarding  her condition or for health maintenance advice. Please see specific information pulled into the AVS if appropriate.

## 2022-03-15 NOTE — PATIENT INSTRUCTIONS
Obesity, Adult  Obesity is the condition of having too much total body fat. Being overweight or obese means that your weight is greater than what is considered healthy for your body size. Obesity is determined by a measurement called BMI. BMI is an estimate of body fat and is calculated from height and weight. For adults, a BMI of 30 or higher is considered obese.  Obesity can lead to other health concerns and major illnesses, including:  Stroke.  Coronary artery disease (CAD).  Type 2 diabetes.  Some types of cancer, including cancers of the colon, breast, uterus, and gallbladder.  Osteoarthritis.  High blood pressure (hypertension).  High cholesterol.  Sleep apnea.  Gallbladder stones.  Infertility problems.  What are the causes?  Common causes of this condition include:  Eating daily meals that are high in calories, sugar, and fat.  Being born with genes that may make you more likely to become obese.  Having a medical condition that causes obesity, including:  Hypothyroidism.  Polycystic ovarian syndrome (PCOS).  Binge-eating disorder.  Cushing syndrome.  Taking certain medicines, such as steroids, antidepressants, and seizure medicines.  Not being physically active (sedentary lifestyle).  Not getting enough sleep.  Drinking high amounts of sugar-sweetened beverages, such as soft drinks.  What increases the risk?  The following factors may make you more likely to develop this condition:  Having a family history of obesity.  Being a woman of  descent.  Being a man of  descent.  Living in an area with limited access to:  [place], recreation centers, or sidewalks.  Healthy food choices, such as grocery stores and [employer] markets.  What are the signs or symptoms?  The main sign of this condition is having too much body fat.  How is this diagnosed?  This condition is diagnosed based on:  Your BMI. If you are an adult with a BMI of 30 or higher, you are considered obese.  Your waist  circumference. This measures the distance around your waistline.  Your skinfold thickness. Your health care provider may gently pinch a fold of your skin and measure it.  You may have other tests to check for underlying conditions.  How is this treated?  Treatment for this condition often includes changing your lifestyle. Treatment may include some or all of the following:  Dietary changes. This may include developing a healthy meal plan.  Regular physical activity. This may include activity that causes your heart to beat faster (aerobic exercise) and strength training. Work with your health care provider to design an exercise program that works for you.  Medicine to help you lose weight if you are unable to lose 1 pound a week after 6 weeks of healthy eating and more physical activity.  Treating conditions that cause the obesity (underlying conditions).  Surgery. Surgical options may include gastric banding and gastric bypass. Surgery may be done if:  Other treatments have not helped to improve your condition.  You have a BMI of 40 or higher.  You have life-threatening health problems related to obesity.  Follow these instructions at home:  Eating and drinking    Follow recommendations from your health care provider about what you eat and drink. Your health care provider may advise you to:  Limit fast food, sweets, and processed snack foods.  Choose low-fat options, such as low-fat milk instead of whole milk.  Eat 5 or more servings of fruits or vegetables every day.  Eat at home more often. This gives you more control over what you eat.  Choose healthy foods when you eat out.  Learn to read food labels. This will help you understand how much food is considered 1 serving.  Learn what a healthy serving size is.  Keep low-fat snacks available.  Limit sugary drinks, such as soda, fruit juice, sweetened iced tea, and flavored milk.  Drink enough water to keep your urine pale yellow.  Do not follow a fad diet. Fad diets  can be unhealthy and even dangerous.    Physical activity  Exercise regularly, as told by your health care provider.  Most adults should get up to 150 minutes of moderate-intensity exercise every week.  Ask your health care provider what types of exercise are safe for you and how often you should exercise.  Warm up and stretch before being active.  Cool down and stretch after being active.  Rest between periods of activity.  Lifestyle  Work with your health care provider and a dietitian to set a weight-loss goal that is healthy and reasonable for you.  Limit your screen time.  Find ways to reward yourself that do not involve food.  Do not drink alcohol if:  Your health care provider tells you not to drink.  You are pregnant, may be pregnant, or are planning to become pregnant.  If you drink alcohol:  Limit how much you use to:  0-1 drink a day for women.  0-2 drinks a day for men.  Be aware of how much alcohol is in your drink. In the [place] one drink equals one 12 oz bottle of beer (355 mL), one 5 oz glass of wine (148 mL), or one 1½ oz glass of hard liquor (44 mL).  General instructions  Keep a weight-loss journal to keep track of the food you eat and how much exercise you get.  Take over-the-counter and prescription medicines only as told by your health care provider.  Take vitamins and supplements only as told by your health care provider.  Consider joining a support group. Your health care provider may be able to recommend a support group.  Keep all follow-up visits as told by your health care provider. This is important.  Contact a health care provider if:  You are unable to meet your weight loss goal after 6 weeks of dietary and lifestyle changes.  Get help right away if you are having:  Trouble breathing.  Suicidal thoughts or behaviors.  Summary  Obesity is the condition of having too much total body fat.  Being overweight or obese means that your weight is greater than what is considered healthy for your  body size.  Work with your health care provider and a dietitian to set a weight-loss goal that is healthy and reasonable for you.  Exercise regularly, as told by your health care provider. Ask your health care provider what types of exercise are safe for you and how often you should exercise.  This information is not intended to replace advice given to you by your health care provider. Make sure you discuss any questions you have with your health care provider.  Document Revised: 08/22/2019 Document Reviewed: 08/22/2019  Elsevier Patient Education © 2021 [employer]

## 2022-03-30 RX ORDER — METOPROLOL SUCCINATE 25 MG/1
TABLET, EXTENDED RELEASE ORAL
Qty: 45 TABLET | Refills: 3 | Status: SHIPPED | OUTPATIENT
Start: 2022-03-30

## 2022-03-30 RX ORDER — APIXABAN 5 MG/1
TABLET, FILM COATED ORAL
Qty: 180 TABLET | Refills: 3 | Status: SHIPPED | OUTPATIENT
Start: 2022-03-30

## 2022-04-04 ENCOUNTER — TELEPHONE (OUTPATIENT)
Dept: PULMONOLOGY | Facility: CLINIC | Age: 72
End: 2022-04-04

## 2022-04-04 DIAGNOSIS — J43.2 CENTRILOBULAR EMPHYSEMA: ICD-10-CM

## 2022-04-04 RX ORDER — TIOTROPIUM BROMIDE AND OLODATEROL 3.124; 2.736 UG/1; UG/1
2 SPRAY, METERED RESPIRATORY (INHALATION)
Qty: 3 EACH | Refills: 4 | Status: SHIPPED | OUTPATIENT
Start: 2022-04-04 | End: 2022-12-14 | Stop reason: SDUPTHER

## 2022-04-04 NOTE — TELEPHONE ENCOUNTER
Patient is needing a refill on her tiotropium bromide-olodaterol (Stiolto Respimat) 2.5-2.5 MCG/ACT aerosol solution inhaler she stated she is needing a 3 month supply. Please send in to pharmacy on chart.

## 2022-04-20 ENCOUNTER — OFFICE VISIT (OUTPATIENT)
Dept: SLEEP MEDICINE | Facility: HOSPITAL | Age: 72
End: 2022-04-20

## 2022-04-20 VITALS
HEIGHT: 68 IN | TEMPERATURE: 97.1 F | BODY MASS INDEX: 40.32 KG/M2 | DIASTOLIC BLOOD PRESSURE: 62 MMHG | WEIGHT: 266 LBS | HEART RATE: 85 BPM | SYSTOLIC BLOOD PRESSURE: 114 MMHG | OXYGEN SATURATION: 93 %

## 2022-04-20 DIAGNOSIS — J43.2 CENTRILOBULAR EMPHYSEMA: ICD-10-CM

## 2022-04-20 DIAGNOSIS — G47.30 OBSERVED SLEEP APNEA: Primary | ICD-10-CM

## 2022-04-20 DIAGNOSIS — G47.10 EXCESSIVE SLEEPINESS: ICD-10-CM

## 2022-04-20 DIAGNOSIS — I10 ESSENTIAL HYPERTENSION: ICD-10-CM

## 2022-04-20 DIAGNOSIS — I48.0 PAROXYSMAL ATRIAL FIBRILLATION: ICD-10-CM

## 2022-04-20 DIAGNOSIS — R06.83 SNORING: ICD-10-CM

## 2022-04-20 DIAGNOSIS — E66.01 CLASS 3 SEVERE OBESITY DUE TO EXCESS CALORIES WITHOUT SERIOUS COMORBIDITY WITH BODY MASS INDEX (BMI) OF 40.0 TO 44.9 IN ADULT: ICD-10-CM

## 2022-04-20 DIAGNOSIS — I50.33 ACUTE ON CHRONIC DIASTOLIC CHF (CONGESTIVE HEART FAILURE): ICD-10-CM

## 2022-04-20 PROBLEM — E66.813 CLASS 3 SEVERE OBESITY DUE TO EXCESS CALORIES WITHOUT SERIOUS COMORBIDITY WITH BODY MASS INDEX (BMI) OF 40.0 TO 44.9 IN ADULT: Status: ACTIVE | Noted: 2022-04-20

## 2022-04-20 PROCEDURE — G0463 HOSPITAL OUTPT CLINIC VISIT: HCPCS | Performed by: INTERNAL MEDICINE

## 2022-04-20 PROCEDURE — 99204 OFFICE O/P NEW MOD 45 MIN: CPT | Performed by: INTERNAL MEDICINE

## 2022-04-20 NOTE — PROGRESS NOTES
[address] [address]  Phone: [phone]  Fax: [phone]      Nica Traylor  [id number]   1950  71 y.o.  female      PCP:Wilder Morales MD    Type of service: Initial New Patient Office Visit  Date of service: 4/20/2022    Chief Complaint   Patient presents with   • Witnessed Apnea   • Snoring   • Non-restorative Sleep   • Fatigue   • Daytime Sleepiness   • Obesity       History of present illness;  Nica Traylor 71 y.o.  is a new patient for me and was seen today for sleep related problems of snoring, non-restorative sleep and witnessed apneas.  Patient reports that she had a home sleep test in 2020 and found to have no evidence of sleep apnea but has severe snoring.  Now she has developed diastolic heart failure and also atrial fibrillation and her cardiologist wants to have her sleep evaluation because of persistent atrial fibrillation.    Patient gives the following sleep history.  Sleep schedule:  Bedtime: 11 PM  Wake time: 10 AM  Normally takes about 30 minutes to fall asleep  Average hours of sleep 9  Number of naps per day 1  Symptoms  In addition to snoring, nonrestorative sleep and witnessed apneas patient gives the following associated symptoms.  Have you ever awakened gasping for breath, coughing, choking: Yes   Change in weight,  Yes   Morning headaches  Yes   Awaken with a sore throat or dry mouth  Yes   Leg jerking at night:  No   Crawly feeling/urge sensation to move in the legs: Yes   Teeth grinding:Yes   Have you ever awakened at night with a sour taste or burning sensation in your chest:  No       Past medical history: (Relevant to sleep medicine)  1. Atrial fibrillation  2. Heart failure diastolic  3. Emphysema  4. Hypertension  5. Obesity    • Medications are reviewed by me and documented in the encounter  • Allergies reviewed and documented in encounter    Social history:  Do you drive a commercial vehicle:   "No   Shift work:  No   Tobacco use:  No quit smoking  Alcohol use:  0 per week  Caffeinated drinks: 1    FAMILY HISTORY (Your mother, father, brothers and sisters)  1. No history of sleep apnea    REVIEW OF SYSTEMS.  Full review of systems available on the intake form which is scanned in the media tab.  The relevant positive are noted below  1. Daytime excessive sleepiness with [place] Sleepiness Scale :Total score: 0   2. Snoring snoring  3. Irregular heartbeat  4. Wheezing  5. Shortness of breath with exertion      Physical exam:  Vitals:    04/20/22 1300   BP: 114/62   Pulse: 85   Temp: 97.1 °F (36.2 °C)   SpO2: 93%   Weight: 121 kg (266 lb)   Height: 172.7 cm (68\")    Body mass index is 40.45 kg/m².    HEENT: Head is atraumatic, normocephalic  Eyes: pupils are round equal and reacting to light and accommodation, conjunctiva normal  Nose: no nasal septal defects or deviation and the nasal passages are clear, no nasal polyps,  Throat: tonsils are not enlarged, tongue normal, oral airway Mallampati class 3  NECK: , trachea is in the midline, thyroid not enlarged  RESPIRATORY SYSTEM: Breath sounds are equal on both sides, there are no wheezes   CARDIOVASULAR SYSTEM: Heart sounds are regular rhythm and efra rate, no edema  EXTREMITES: No cyanosis, clubbing  NEUROLOGICAL SYSTEM: Oriented x 3, no gross motor defects, gait normal    Office notes from care team reviewed.  Cardiology note from [facility] was reviewed on care everywhere    Assessment and plan:  · Witnessed apnea (R06.81) patient's symptoms and examination is consistent with sleep apnea (G47.30)  I have talked to the patient about the signs and symptoms of sleep apnea. In addition, I have also discussed pathophysiology of sleep apnea.  I also discussed the complications of untreated sleep apnea including effects on hypertension, diabetes mellitus and nonrestorative sleep with hypersomnia which can increase risk for motor vehicle accidents.  Untreated sleep " apnea is also a risk factor for development of atrial fibrillation, pulmonary hypertension and stroke.  Discussed in detail of various testing methods including home-based and lab based sleep studies.  Based on history and physical examination the most appropriate study is full night polysomnography.  The order for the sleep study is placed in [facility].  The test will be scheduled after approval from insurance. Treatment and management will be discussed after the test is completed.  Patient was given opportunity to ask questions and all the questions were answered.   · Snoring (R06.83) snoring is the sound created by turbulent airflow vibrating upper airway soft tissue.  I have also discussed factors affecting snoring including sleep deprivation, sleeping on the back and alcohol ingestion. To minimize snoring, patient is advised to have adequate sleep, sleep on the side and avoid alcohol and sedative medications before bedtime  · COPD/emphysema.  On bronchodilators  · Atrial fibrillation.  On medical management  · Heart failure, diastolic, medical management  · Obesity morbid, with BMI Body mass index is 40.45 kg/m².. I have discussed the relationship between weight and sleep apnea.There is direct correlation between weight and severity of sleep apnea.  Weight reduction is encouraged, as it is going to reduce the severity of sleep apnea. I have also discussed with the patient diet and exercise to achieve ideal body weight     I have also discussed with the patient the following  • Sleep hygiene: Maintaining a regular bedtime and wake time, not to watch television or work in bed, limit caffeine-containing beverages before bed time and avoid naps during the day  • Adequate amount of sleep.  Generally most people needs about 7 to 8 hours of sleep.  • Return for 31 to 90 days after PAP setup with down load..  Patient's questions were answered.        Arthur Hernandez MD  Sleep Medicine.  Medical Director, [facility]  [facility] and [place] [facility]  4/20/2022 ,

## 2022-05-26 ENCOUNTER — HOSPITAL ENCOUNTER (OUTPATIENT)
Dept: SLEEP MEDICINE | Facility: HOSPITAL | Age: 72
Discharge: HOME | End: 2022-05-26
Admitting: INTERNAL MEDICINE

## 2022-05-26 DIAGNOSIS — G47.10 EXCESSIVE SLEEPINESS: ICD-10-CM

## 2022-05-26 DIAGNOSIS — I48.0 PAROXYSMAL ATRIAL FIBRILLATION: ICD-10-CM

## 2022-05-26 DIAGNOSIS — J43.2 CENTRILOBULAR EMPHYSEMA: ICD-10-CM

## 2022-05-26 DIAGNOSIS — R06.83 SNORING: ICD-10-CM

## 2022-05-26 DIAGNOSIS — I50.33 ACUTE ON CHRONIC DIASTOLIC CHF (CONGESTIVE HEART FAILURE): ICD-10-CM

## 2022-05-26 DIAGNOSIS — I10 ESSENTIAL HYPERTENSION: ICD-10-CM

## 2022-05-26 DIAGNOSIS — E66.01 CLASS 3 SEVERE OBESITY DUE TO EXCESS CALORIES WITHOUT SERIOUS COMORBIDITY WITH BODY MASS INDEX (BMI) OF 40.0 TO 44.9 IN ADULT: ICD-10-CM

## 2022-05-26 DIAGNOSIS — G47.30 OBSERVED SLEEP APNEA: ICD-10-CM

## 2022-05-26 PROCEDURE — 95810 POLYSOM 6/> YRS 4/> PARAM: CPT

## 2022-05-26 PROCEDURE — 95810 POLYSOM 6/> YRS 4/> PARAM: CPT | Performed by: INTERNAL MEDICINE

## 2022-06-01 ENCOUNTER — TELEPHONE (OUTPATIENT)
Dept: SLEEP MEDICINE | Facility: HOSPITAL | Age: 72
End: 2022-06-01

## 2022-06-01 DIAGNOSIS — G47.34 SLEEP RELATED HYPOXIA: ICD-10-CM

## 2022-06-01 DIAGNOSIS — G47.61 PLMD (PERIODIC LIMB MOVEMENT DISORDER): ICD-10-CM

## 2022-06-01 DIAGNOSIS — R06.83 SNORING: ICD-10-CM

## 2022-06-01 DIAGNOSIS — G47.33 OSA (OBSTRUCTIVE SLEEP APNEA): Primary | ICD-10-CM

## 2022-06-03 ENCOUNTER — OFFICE VISIT (OUTPATIENT)
Dept: PULMONOLOGY | Facility: CLINIC | Age: 72
End: 2022-06-03

## 2022-06-03 VITALS
DIASTOLIC BLOOD PRESSURE: 71 MMHG | HEIGHT: 68 IN | SYSTOLIC BLOOD PRESSURE: 120 MMHG | RESPIRATION RATE: 16 BRPM | OXYGEN SATURATION: 93 % | WEIGHT: 261 LBS | BODY MASS INDEX: 39.56 KG/M2 | HEART RATE: 63 BPM

## 2022-06-03 DIAGNOSIS — G47.33 OSA (OBSTRUCTIVE SLEEP APNEA): ICD-10-CM

## 2022-06-03 DIAGNOSIS — E66.01 SEVERE OBESITY (BMI 35.0-39.9) WITH COMORBIDITY: ICD-10-CM

## 2022-06-03 DIAGNOSIS — R42 DIZZINESS: ICD-10-CM

## 2022-06-03 DIAGNOSIS — R06.09 DYSPNEA ON EXERTION: ICD-10-CM

## 2022-06-03 DIAGNOSIS — I48.0 PAROXYSMAL ATRIAL FIBRILLATION: ICD-10-CM

## 2022-06-03 DIAGNOSIS — R42 POSTURAL LIGHTHEADEDNESS: ICD-10-CM

## 2022-06-03 DIAGNOSIS — I50.33 ACUTE ON CHRONIC DIASTOLIC CHF (CONGESTIVE HEART FAILURE): ICD-10-CM

## 2022-06-03 DIAGNOSIS — F17.211 NICOTINE DEPENDENCE, CIGARETTES, IN REMISSION: ICD-10-CM

## 2022-06-03 DIAGNOSIS — J43.2 CENTRILOBULAR EMPHYSEMA: Primary | ICD-10-CM

## 2022-06-03 DIAGNOSIS — I10 ESSENTIAL HYPERTENSION: ICD-10-CM

## 2022-06-03 PROCEDURE — 99214 OFFICE O/P EST MOD 30 MIN: CPT | Performed by: NURSE PRACTITIONER

## 2022-06-03 RX ORDER — GABAPENTIN 300 MG/1
300 CAPSULE ORAL
COMMUNITY
Start: 2022-06-02 | End: 2022-12-05 | Stop reason: SDUPTHER

## 2022-06-03 NOTE — PATIENT INSTRUCTIONS
Sleep Apnea  Sleep apnea is a condition in which breathing pauses or becomes shallow during sleep. Episodes of sleep apnea usually last 10 seconds or longer, and they may occur as many as 20 times an hour. Sleep apnea disrupts your sleep and keeps your body from getting the rest that it needs. This condition can increase your risk of certain health problems, including:  Heart attack.  Stroke.  Obesity.  Diabetes.  Heart failure.  Irregular heartbeat.  What are the causes?  There are three kinds of sleep apnea:  Obstructive sleep apnea. This kind is caused by a blocked or collapsed airway.  Central sleep apnea. This kind happens when the part of the brain that controls breathing does not send the correct signals to the muscles that control breathing.  Mixed sleep apnea. This is a combination of obstructive and central sleep apnea.  The most common cause of this condition is a collapsed or blocked airway. An airway can collapse or become blocked if:  Your throat muscles are abnormally relaxed.  Your tongue and tonsils are larger than normal.  You are overweight.  Your airway is smaller than normal.  What increases the risk?  You are more likely to develop this condition if you:  Are overweight.  Smoke.  Have a smaller than normal airway.  Are elderly.  Are male.  Drink alcohol.  Take sedatives or tranquilizers.  Have a family history of sleep apnea.  What are the signs or symptoms?  Symptoms of this condition include:  Trouble staying asleep.  Daytime sleepiness and tiredness.  Irritability.  Loud snoring.  Morning headaches.  Trouble concentrating.  Forgetfulness.  Decreased interest in sex.  Unexplained sleepiness.  Mood swings.  Personality changes.  Feelings of depression.  Waking up often during the night to urinate.  Dry mouth.  Sore throat.  How is this diagnosed?  This condition may be diagnosed with:  A medical history.  A physical exam.  A series of tests that are done while you are sleeping (sleep study).  These tests are usually done in a sleep lab, but they may also be done at home.  How is this treated?  Treatment for this condition aims to restore normal breathing and to ease symptoms during sleep. It may involve managing health issues that can affect breathing, such as high blood pressure or obesity. Treatment may include:  Sleeping on your side.  Using a decongestant if you have nasal congestion.  Avoiding the use of depressants, including alcohol, sedatives, and narcotics.  Losing weight if you are overweight.  Making changes to your diet.  Quitting smoking.  Using a device to open your airway while you sleep, such as:  An oral appliance. This is a custom-made mouthpiece that shifts your lower jaw forward.  A continuous positive airway pressure (CPAP) device. This device blows air through a mask when you breathe out (exhale).  A nasal expiratory positive airway pressure (EPAP) device. This device has valves that you put into each nostril.  A bi-level positive airway pressure (BPAP) device. This device blows air through a mask when you breathe in (inhale) and breathe out (exhale).  Having surgery if other treatments do not work. During surgery, excess tissue is removed to create a wider airway.  It is important to get treatment for sleep apnea. Without treatment, this condition can lead to:  High blood pressure.  Coronary artery disease.  In men, an inability to achieve or maintain an erection (impotence).  Reduced thinking abilities.  Follow these instructions at home:  Lifestyle  Make any lifestyle changes that your health care provider recommends.  Eat a healthy, well-balanced diet.  Take steps to lose weight if you are overweight.  Avoid using depressants, including alcohol, sedatives, and narcotics.  Do not use any products that contain nicotine or tobacco, such as cigarettes, e-cigarettes, and chewing tobacco. If you need help quitting, ask your health care provider.  General instructions  Take  over-the-counter and prescription medicines only as told by your health care provider.  If you were given a device to open your airway while you sleep, use it only as told by your health care provider.  If you are having surgery, make sure to tell your health care provider you have sleep apnea. You may need to bring your device with you.  Keep all follow-up visits as told by your health care provider. This is important.  Contact a health care provider if:  The device that you received to open your airway during sleep is uncomfortable or does not seem to be working.  Your symptoms do not improve.  Your symptoms get worse.  Get help right away if:  You develop:  Chest pain.  Shortness of breath.  Discomfort in your back, arms, or stomach.  You have:  Trouble speaking.  Weakness on one side of your body.  Drooping in your face.  These symptoms may represent a serious problem that is an emergency. Do not wait to see if the symptoms will go away. Get medical help right away. Call your local emergency services (911 in the [place] Do not drive yourself to the hospital.  Summary  Sleep apnea is a condition in which breathing pauses or becomes shallow during sleep.  The most common cause is a collapsed or blocked airway.  The goal of treatment is to restore normal breathing and to ease symptoms during sleep.  This information is not intended to replace advice given to you by your health care provider. Make sure you discuss any questions you have with your health care provider.  Document Revised: 06/03/2020 Document Reviewed: 08/13/2019  [employer] Patient Education © 2021 [employer]  Obesity, Adult  Obesity is the condition of having too much total body fat. Being overweight or obese means that your weight is greater than what is considered healthy for your body size. Obesity is determined by a measurement called BMI. BMI is an estimate of body fat and is calculated from height and weight. For adults, a BMI of 30 or higher  is considered obese.  Obesity can lead to other health concerns and major illnesses, including:  Stroke.  Coronary artery disease (CAD).  Type 2 diabetes.  Some types of cancer, including cancers of the colon, breast, uterus, and gallbladder.  Osteoarthritis.  High blood pressure (hypertension).  High cholesterol.  Sleep apnea.  Gallbladder stones.  Infertility problems.  What are the causes?  Common causes of this condition include:  Eating daily meals that are high in calories, sugar, and fat.  Being born with genes that may make you more likely to become obese.  Having a medical condition that causes obesity, including:  Hypothyroidism.  Polycystic ovarian syndrome (PCOS).  Binge-eating disorder.  Cushing syndrome.  Taking certain medicines, such as steroids, antidepressants, and seizure medicines.  Not being physically active (sedentary lifestyle).  Not getting enough sleep.  Drinking high amounts of sugar-sweetened beverages, such as soft drinks.  What increases the risk?  The following factors may make you more likely to develop this condition:  Having a family history of obesity.  Being a woman of  descent.  Being a man of  descent.  Living in an area with limited access to:  [place], recreation centers, or sidewalks.  Healthy food choices, such as grocery stores and farmers' markets.  What are the signs or symptoms?  The main sign of this condition is having too much body fat.  How is this diagnosed?  This condition is diagnosed based on:  Your BMI. If you are an adult with a BMI of 30 or higher, you are considered obese.  Your waist circumference. This measures the distance around your waistline.  Your skinfold thickness. Your health care provider may gently pinch a fold of your skin and measure it.  You may have other tests to check for underlying conditions.  How is this treated?  Treatment for this condition often includes changing your lifestyle. Treatment may include some or all  of the following:  Dietary changes. This may include developing a healthy meal plan.  Regular physical activity. This may include activity that causes your heart to beat faster (aerobic exercise) and strength training. Work with your health care provider to design an exercise program that works for you.  Medicine to help you lose weight if you are unable to lose 1 pound a week after 6 weeks of healthy eating and more physical activity.  Treating conditions that cause the obesity (underlying conditions).  Surgery. Surgical options may include gastric banding and gastric bypass. Surgery may be done if:  Other treatments have not helped to improve your condition.  You have a BMI of 40 or higher.  You have life-threatening health problems related to obesity.  Follow these instructions at home:  Eating and drinking    Follow recommendations from your health care provider about what you eat and drink. Your health care provider may advise you to:  Limit fast food, sweets, and processed snack foods.  Choose low-fat options, such as low-fat milk instead of whole milk.  Eat 5 or more servings of fruits or vegetables every day.  Eat at home more often. This gives you more control over what you eat.  Choose healthy foods when you eat out.  Learn to read food labels. This will help you understand how much food is considered 1 serving.  Learn what a healthy serving size is.  Keep low-fat snacks available.  Limit sugary drinks, such as soda, fruit juice, sweetened iced tea, and flavored milk.  Drink enough water to keep your urine pale yellow.  Do not follow a fad diet. Fad diets can be unhealthy and even dangerous.    Physical activity  Exercise regularly, as told by your health care provider.  Most adults should get up to 150 minutes of moderate-intensity exercise every week.  Ask your health care provider what types of exercise are safe for you and how often you should exercise.  Warm up and stretch before being active.  Cool  down and stretch after being active.  Rest between periods of activity.  Lifestyle  Work with your health care provider and a dietitian to set a weight-loss goal that is healthy and reasonable for you.  Limit your screen time.  Find ways to reward yourself that do not involve food.  Do not drink alcohol if:  Your health care provider tells you not to drink.  You are pregnant, may be pregnant, or are planning to become pregnant.  If you drink alcohol:  Limit how much you use to:  0-1 drink a day for women.  0-2 drinks a day for men.  Be aware of how much alcohol is in your drink. In the [place] one drink equals one 12 oz bottle of beer (355 mL), one 5 oz glass of wine (148 mL), or one 1½ oz glass of hard liquor (44 mL).  General instructions  Keep a weight-loss journal to keep track of the food you eat and how much exercise you get.  Take over-the-counter and prescription medicines only as told by your health care provider.  Take vitamins and supplements only as told by your health care provider.  Consider joining a support group. Your health care provider may be able to recommend a support group.  Keep all follow-up visits as told by your health care provider. This is important.  Contact a health care provider if:  You are unable to meet your weight loss goal after 6 weeks of dietary and lifestyle changes.  Get help right away if you are having:  Trouble breathing.  Suicidal thoughts or behaviors.  Summary  Obesity is the condition of having too much total body fat.  Being overweight or obese means that your weight is greater than what is considered healthy for your body size.  Work with your health care provider and a dietitian to set a weight-loss goal that is healthy and reasonable for you.  Exercise regularly, as told by your health care provider. Ask your health care provider what types of exercise are safe for you and how often you should exercise.  This information is not intended to replace advice given to you  by your health care provider. Make sure you discuss any questions you have with your health care provider.  Document Revised: 08/22/2019 Document Reviewed: 08/22/2019  [employer] Patient Education © 2021 [employer]

## 2022-06-03 NOTE — PROGRESS NOTES
Primary Care Provider  Wilder Morales MD     Referring Provider  No ref. provider found     Chief Complaint  Pulmonary emphysema (3 month f/u), Shortness of Breath (Patient is still having trouble breathing, says the inhaler prescribed last visit helps a little but not much. ), and Cough (Sometimes)    Subjective          Nica Traylor presents to [facility]  History of Present Illness  Nica Traylor is a 71 y.o. female patient of Dr. Reynolds here for management of excessive sleepiness, centrilobular emphysema, tobacco abuse of cigarettes in remission, hypertension, atrial fibrillation, congestive heart failure, and dyspnea on exertion.    Patient recently had a sleep study in May 2022, which confirms a diagnosis of obstructive sleep apnea.  Patient's AHI was 14.3.  Sleep medicine is managing and recommended a auto CPAP 8-16.  Patient will follow-up with sleep medicine for compliance.  She states that overall she is doing okay since her last visit.  She denies using any antibiotics or steroids for her lungs.  She denies any fevers, chills, or wheezing.  Patient does have an occasional cough.  She continues to use the Stiolto that was given to her at her last appointment.  She states that at first it did work with her shortness of breath, but states that here recently she has not noticed much of an improvement.  Patient states that she does not use her albuterol inhaler, as she is to was told it was only for emergencies.  She is agreeable to trying Trelegy at this time and also using her albuterol in cases where she is short of breath.  Patient shortness of breath is considered to be mild to moderate in severity, worse with exertion and improved with rest.  She also complains of dizziness and lightheadedness.  She states that this has been an ongoing problem for approximately 3 years, but states that it is slowly getting worse.  Patient states that  this occurs mostly when she is changing positions at times.  Explained that this could be orthostatic hypotension and that it is important for her to change positions slowly if this is the case.  She is not interested in being checked for orthostatic hypotension at this time.  Patient also has paroxysmal atrial fibrillation, hypertension, and diastolic congestive heart failure, which is being managed by cardiology.  Overall, patient has no additional concerns or questions at this time.  She is able to perform her ADLs without difficulty.  She is up-to-date with her flu, pneumonia, and COVID vaccines.     Her history of smoking is   Tobacco Use: Medium Risk   • Smoking Tobacco Use: Former Smoker   • Smokeless Tobacco Use: Never Used   .    Review of Systems   Constitutional: Negative for chills, fatigue, fever, unexpected weight gain and unexpected weight loss.   HENT: Negative for congestion (Nasal), hearing loss, mouth sores, nosebleeds, postnasal drip, sore throat and trouble swallowing.    Eyes: Negative for blurred vision and visual disturbance.   Respiratory: Positive for cough and shortness of breath. Negative for apnea and wheezing.         Negative for Hemoptysis     Cardiovascular: Negative for chest pain, palpitations and leg swelling.   Gastrointestinal: Negative for abdominal pain, constipation, diarrhea, nausea, vomiting and GERD.        Negative for Jaundice  Negative for Bloating  Negative for Melena   Musculoskeletal: Negative for joint swelling and myalgias.        Negative for Joint pain  Negative for Joint stiffness   Skin: Negative for color change.        Negative for cyanosis   Neurological: Negative for syncope, weakness, numbness and headache.      Sleep: Negative for Excessive daytime sleepiness  Negative for morning headaches  Negative for Snoring    Family History   Problem Relation Age of Onset   • Stroke Mother    • Heart disease Mother    • Diabetes Mother    • Pancreatic cancer Mother     • Heart disease Father    • Heart disease Sister    • Cervical cancer Sister    • Uterine cancer Sister    • Heart disease Brother    • Stroke Other         Maternal grandparent/Paternal grandparent   • Heart disease Other         Maternal grandparent/Paternal grandparent   • Stroke Maternal Aunt    • Heart disease Maternal Aunt         Social History     Socioeconomic History   • Marital status:    Tobacco Use   • Smoking status: Former Smoker     Packs/day: 0.50     Years: 25.00     Pack years: 12.50     Types: Cigarettes     Quit date: 2016     Years since quittin.4   • Smokeless tobacco: Never Used   Vaping Use   • Vaping Use: Never used   Substance and Sexual Activity   • Alcohol use: Never     Comment: Does not drink   • Drug use: Never   • Sexual activity: Defer        Past Medical History:   Diagnosis Date   • Asthma    • Burning mouth syndrome    • COPD (chronic obstructive pulmonary disease)    • Depression    • Diastolic CHF 2021   • Essential hypertension 2012   • GERD (gastroesophageal reflux disease)    • Hyperlipidemia    • Oral lesion    • Paroxysmal atrial fibrillation 2021        Immunization History   Administered Date(s) Administered   • COVID-19 (MODERNA) 1st, 2nd, 3rd Dose Only 2021, 2021, 2021, 2021   • Flu Vaccine Quad PF >36MO 2014   • Flu Vaccine Split Quad 2005, 10/29/2008, 09/10/2010, 11/10/2011, 2014   • FluLaval/Fluarix/Fluzone >6 2014   • Fluad Quad 65+ 2020   • Fluzone High Dose =>65 Years (Vaxcare ONLY) 2015, 10/24/2016, 2017, 2018, 10/11/2019   • Fluzone High-Dose 65+yrs 10/09/2021   • Influenza TIV (IM) 2005, 10/29/2008, 09/10/2010, 11/10/2011   • Pneumococcal Conjugate 13-Valent (PCV13) 2015   • Pneumococcal Polysaccharide (PPSV23) 2017   • Shingrix 2020, 2020   • Tdap 07/15/2013         Allergies   Allergen Reactions   • Propafenone Other (See  Comments)     Drops B/p   • Erythromycin Other (See Comments)     Stroke like   • Mercury Unknown - Low Severity   • Metoclopramide Other (See Comments)     Decreased blood pressure     • Moxifloxacin Hcl Unknown - Low Severity   • Penicillins Unknown - Low Severity          Current Outpatient Medications:   •  acetaminophen (TYLENOL) 500 MG tablet, Take 500 mg by mouth., Disp: , Rfl:   •  acetaminophen (TYLENOL) 500 MG tablet, Take 500 mg by mouth., Disp: , Rfl:   •  albuterol sulfate  (90 Base) MCG/ACT inhaler, Inhale 2 puffs Every 4 (Four) Hours As Needed for Wheezing., Disp: 54 g, Rfl: 3  •  atorvastatin (LIPITOR) 80 MG tablet, Take 80 mg by mouth Daily., Disp: , Rfl:   •  azithromycin (ZITHROMAX) 250 MG tablet, , Disp: , Rfl:   •  baclofen (LIORESAL) 10 MG tablet, Take 10 mg by mouth 3 (Three) Times a Day., Disp: , Rfl:   •  Cholecalciferol (VITAMIN D3 PO), Take 2,000 Units by mouth Daily., Disp: , Rfl:   •  citalopram (CeleXA) 20 MG tablet, Take 20 mg by mouth Daily., Disp: , Rfl:   •  diphenhydrAMINE (BENADRYL) 25 MG tablet, Take 25 mg by mouth Every 6 (Six) Hours As Needed., Disp: , Rfl:   •  Eliquis 5 MG tablet tablet, TAKE 1 TABLET BY MOUTH TWICE A DAY, Disp: 180 tablet, Rfl: 3  •  estradiol (ESTRACE) 0.1 MG/GM vaginal cream, estradiol 0.01% (0.1 mg/gram) vaginal cream, Disp: , Rfl:   •  famotidine (PEPCID) 20 MG tablet, Take 20 mg by mouth every night at bedtime., Disp: , Rfl:   •  flecainide (TAMBOCOR) 50 MG tablet, Take 50 mg by mouth 2 (Two) Times a Day., Disp: , Rfl:   •  furosemide (LASIX) 20 MG tablet, furosemide 20 mg tablet, Disp: , Rfl:   •  gabapentin (NEURONTIN) 300 MG capsule, Take 300 mg by mouth Daily., Disp: , Rfl:   •  gabapentin (NEURONTIN) 300 MG capsule, Take 300 mg by mouth., Disp: , Rfl:   •  hydroCHLOROthiazide (HYDRODIURIL) 25 MG tablet, Take 25 mg by mouth Daily., Disp: , Rfl:   •  HYDROcodone-acetaminophen (NORCO)  MG per tablet, hydrocodone 10 mg-acetaminophen 325 mg  tablet, Disp: , Rfl:   •  hydrOXYzine (ATARAX) 25 MG tablet, hydroxyzine HCl 25 mg tablet, Disp: , Rfl:   •  meclizine (ANTIVERT) 25 MG tablet, Take 1 tablet by mouth 4 (Four) Times a Day As Needed for Dizziness., Disp: 60 tablet, Rfl: 0  •  metoprolol succinate XL (TOPROL-XL) 25 MG 24 hr tablet, TAKE 1/2 TABLET BY MOUTH EVERY DAY, Disp: 45 tablet, Rfl: 3  •  mirtazapine (REMERON) 7.5 MG half tablet, Take 15 mg by mouth Every Night., Disp: , Rfl:   •  mirtazapine (REMERON) 7.5 MG tablet, Take 7.5 mg by mouth every night at bedtime., Disp: , Rfl:   •  multivitamin (THERAGRAN) tablet tablet, Take  by mouth., Disp: , Rfl:   •  ondansetron ODT (ZOFRAN-ODT) 4 MG disintegrating tablet, Place 1 tablet on the tongue 4 (Four) Times a Day As Needed for Nausea., Disp: 30 tablet, Rfl: 0  •  pantoprazole (PROTONIX) 40 MG EC tablet, Take 1 tablet by mouth Daily., Disp: 90 tablet, Rfl: 1  •  potassium chloride (K-DUR,KLOR-CON) 20 MEQ CR tablet, potassium chloride ER 20 mEq tablet,extended release, Disp: , Rfl:   •  PROBIOTIC PRODUCT PO, Take  by mouth., Disp: , Rfl:   •  prochlorperazine (COMPAZINE) 5 MG tablet, prochlorperazine maleate 5 mg oral tablet take 1 tablet (5 mg) by oral route 4 times per day   Active, Disp: , Rfl:   •  promethazine (PHENERGAN) 25 MG tablet, promethazine 25 mg oral tablet take 1 tablet (25 mg) by oral route every 6 hours as needed   Active, Disp: , Rfl:   •  senna (SENOKOT) 8.6 MG tablet, Take 1 tablet by mouth Daily., Disp: , Rfl:   •  spironolactone (ALDACTONE) 25 MG tablet, Take 12.5 mg by mouth Daily., Disp: , Rfl:   •  spironolactone-hydrochlorothiazide (ALDACTAZIDE) 25-25 MG tablet, spironolactone 25 mg-hydrochlorothiazide 25 mg tablet, Disp: , Rfl:   •  sucralfate (CARAFATE) 1 g tablet, sucralfate 1 gram tablet, Disp: , Rfl:   •  sulfamethoxazole-trimethoprim (BACTRIM DS,SEPTRA DS) 800-160 MG per tablet, , Disp: , Rfl:   •  traZODone (DESYREL) 50 MG tablet, trazodone 50 mg tablet, Disp: , Rfl:    •  Zoster Vac Recomb Adjuvanted (Shingrix) 50 MCG/0.5ML reconstituted suspension, Shingrix (PF) 50 mcg/0.5 mL intramuscular suspension, kit, Disp: , Rfl:   •  Fluticasone-Umeclidin-Vilant (Trelegy Ellipta) 100-62.5-25 MCG/INH inhaler, Inhale 1 puff Daily., Disp: 2 each, Rfl: 0  •  tiotropium bromide-olodaterol (Stiolto Respimat) 2.5-2.5 MCG/ACT aerosol solution inhaler, Inhale 2 puffs Daily., Disp: 3 each, Rfl: 4     Objective   Physical Exam  Constitutional:       General: She is not in acute distress.     Appearance: Normal appearance. She is obese.   HENT:      Right Ear: Hearing normal.      Left Ear: Hearing normal.      Nose: No nasal tenderness or congestion.      Mouth/Throat:      Mouth: Mucous membranes are moist. No oral lesions.   Eyes:      Extraocular Movements: Extraocular movements intact.      Pupils: Pupils are equal, round, and reactive to light.   Neck:      Thyroid: No thyroid mass or thyromegaly.   Cardiovascular:      Rate and Rhythm: Normal rate and regular rhythm.      Pulses: Normal pulses.      Heart sounds: Normal heart sounds. No murmur heard.  Pulmonary:      Effort: Pulmonary effort is normal.      Breath sounds: Normal breath sounds. No wheezing, rhonchi or rales.   Chest:   Breasts:      Right: No axillary adenopathy.       Abdominal:      General: Bowel sounds are normal. There is no distension.      Palpations: Abdomen is soft.      Tenderness: There is no abdominal tenderness.   Musculoskeletal:      Cervical back: Neck supple.      Right lower leg: No edema.      Left lower leg: No edema.   Lymphadenopathy:      Cervical: No cervical adenopathy.      Upper Body:      Right upper body: No axillary adenopathy.   Skin:     General: Skin is warm and dry.      Findings: No lesion or rash.   Neurological:      General: No focal deficit present.      Mental Status: She is alert and oriented to person, place, and time.      Cranial Nerves: Cranial nerves are intact.   Psychiatric:     "     Mood and Affect: Affect normal. Mood is not anxious or depressed.         Vital Signs:   /71 (BP Location: Left arm, Patient Position: Sitting, Cuff Size: Adult)   Pulse 63   Resp 16   Ht 172.7 cm (68\")   Wt 118 kg (261 lb)   SpO2 93% Comment: room air  BMI 39.68 kg/m²        Result Review :     CMP    CMP 10/15/21 12/25/21 5/3/22   Glucose  116 (A)    Glucose 90  113 (A)   BUN 20 28 (A) 27 (A)   Creatinine 1.5 1.74 (A) 1.63 (A)   eGFR Non African Am  29 (A)    Sodium 139 136 139   Potassium 4.4 4.1 4.5   Chloride 100 95 (A) 98   Calcium 10.2 10.1 10.1   Albumin 4.6 4.70 4.6   Total Bilirubin 0.8 0.7 0.7   Alkaline Phosphatase 92 90 120   AST (SGOT) 33 24 27   ALT (SGPT) 20 18 17   (A) Abnormal value            CBC w/diff    CBC w/Diff 12/25/21 1/3/22 5/3/22   WBC 14.89 (A) 11.09 (A) 11.41 (A)   RBC 4.48 4.53 4.66   Hemoglobin 11.9 (A) 11.8 (A) 12.0   Hematocrit 35.0 37.3 38.6   MCV 78.1 (A) 82.3 82.8   MCH 26.6 26.0 25.8 (A)   MCHC 34.0 31.6 31.1   RDW 15.3 15.8 15.0   Platelets 375 361 424   Neutrophil Rel % 73.2 63.2 64.8   Immature Granulocyte Rel % 0.7 (A) 0.3 0.4   Lymphocyte Rel % 16.3 (A) 25.5 23.6   Monocyte Rel % 8.9 9.6 9.5   Eosinophil Rel % 0.5 0.9 1.2   Basophil Rel % 0.4 0.5 0.5   (A) Abnormal value            Data reviewed: Radiologic studies Chest x-ray 2/28/2022, sleep study 5/27/2022 and My last office note   Procedures        Assessment and Plan    Diagnoses and all orders for this visit:    1. Centrilobular emphysema (HCC) (Primary)  -     CT Chest Without Contrast; Future  -     Fluticasone-Umeclidin-Vilant (Trelegy Ellipta) 100-62.5-25 MCG/INH inhaler; Inhale 1 puff Daily.  Dispense: 2 each; Refill: 0    2. Nicotine dependence, cigarettes, in remission    3. CRISTINO (obstructive sleep apnea)    4. Diastolic CHF    5. Paroxysmal atrial fibrillation (HCC)    6. Essential hypertension    7. Dyspnea on exertion  -     CT Chest Without Contrast; Future    8. Dizziness    9. Postural " lightheadedness    10. Severe obesity (BMI 35.0-39.9) with comorbidity (HCC)    11.  Stop Stiolto and start Trelegy 1 puff daily.  Rinse mouth out after each use.  12.  Continue albuterol as needed.  13.  Follow-up with sleep medicine for management of sleep apnea.  14.  Continue follow-up with cardiology for congestive heart failure, atrial fibrillation and hypertension.  15.  Encouraged patient try stay as active as possible.  Recommended 30 minutes of daily exercise.  16.  CT scan of the chest ordered to further evaluate patient's shortness of breath.  17.  Follow-up with me in 3 months, sooner if needed.    I spent 30 minutes caring for Nica on this date of service. This time includes time spent by me in the following activities:preparing for the visit, reviewing tests, obtaining and/or reviewing a separately obtained history, performing a medically appropriate examination and/or evaluation , counseling and educating the patient/family/caregiver, ordering medications, tests, or procedures and documenting information in the medical record    Follow Up   Return in about 3 months (around 9/3/2022) for Recheck with Neris.  Patient was given instructions and counseling regarding her condition or for health maintenance advice. Please see specific information pulled into the AVS if appropriate.

## 2022-06-25 ENCOUNTER — APPOINTMENT (OUTPATIENT)
Dept: GENERAL RADIOLOGY | Facility: HOSPITAL | Age: 72
End: 2022-06-25

## 2022-06-25 ENCOUNTER — HOSPITAL ENCOUNTER (EMERGENCY)
Facility: HOSPITAL | Age: 72
Discharge: HOME | End: 2022-06-26
Attending: EMERGENCY MEDICINE | Admitting: EMERGENCY MEDICINE

## 2022-06-25 DIAGNOSIS — L03.116 CELLULITIS OF LEFT LOWER EXTREMITY: Primary | ICD-10-CM

## 2022-06-25 DIAGNOSIS — E86.0 MILD DEHYDRATION: ICD-10-CM

## 2022-06-25 DIAGNOSIS — E87.6 HYPOKALEMIA: ICD-10-CM

## 2022-06-25 LAB
ALBUMIN SERPL-MCNC: 4.8 G/DL (ref 3.5–5.2)
ALBUMIN/GLOB SERPL: 1.7 G/DL
ALP SERPL-CCNC: 130 U/L (ref 39–117)
ALT SERPL W P-5'-P-CCNC: 15 U/L (ref 1–33)
ANION GAP SERPL CALCULATED.3IONS-SCNC: 14.7 MMOL/L (ref 5–15)
AST SERPL-CCNC: 24 U/L (ref 1–32)
BASOPHILS # BLD AUTO: 0.07 10*3/MM3 (ref 0–0.2)
BASOPHILS NFR BLD AUTO: 0.5 % (ref 0–1.5)
BILIRUB SERPL-MCNC: 0.8 MG/DL (ref 0–1.2)
BUN SERPL-MCNC: 35 MG/DL (ref 8–23)
BUN/CREAT SERPL: 16.4 (ref 7–25)
CALCIUM SPEC-SCNC: 10.2 MG/DL (ref 8.6–10.5)
CHLORIDE SERPL-SCNC: 95 MMOL/L (ref 98–107)
CO2 SERPL-SCNC: 29.3 MMOL/L (ref 22–29)
CREAT SERPL-MCNC: 2.13 MG/DL (ref 0.57–1)
CRP SERPL-MCNC: 0.59 MG/DL (ref 0–0.5)
DEPRECATED RDW RBC AUTO: 45 FL (ref 37–54)
EGFRCR SERPLBLD CKD-EPI 2021: 24.4 ML/MIN/1.73
EOSINOPHIL # BLD AUTO: 0.13 10*3/MM3 (ref 0–0.4)
EOSINOPHIL NFR BLD AUTO: 1 % (ref 0.3–6.2)
ERYTHROCYTE [DISTWIDTH] IN BLOOD BY AUTOMATED COUNT: 16 % (ref 12.3–15.4)
ERYTHROCYTE [SEDIMENTATION RATE] IN BLOOD: 18 MM/HR (ref 0–30)
GLOBULIN UR ELPH-MCNC: 2.9 GM/DL
GLUCOSE SERPL-MCNC: 121 MG/DL (ref 65–99)
HCT VFR BLD AUTO: 34.5 % (ref 34–46.6)
HGB BLD-MCNC: 11.4 G/DL (ref 12–15.9)
HOLD SPECIMEN: NORMAL
HOLD SPECIMEN: NORMAL
IMM GRANULOCYTES # BLD AUTO: 0.04 10*3/MM3 (ref 0–0.05)
IMM GRANULOCYTES NFR BLD AUTO: 0.3 % (ref 0–0.5)
LYMPHOCYTES # BLD AUTO: 3.37 10*3/MM3 (ref 0.7–3.1)
LYMPHOCYTES NFR BLD AUTO: 25.5 % (ref 19.6–45.3)
MCH RBC QN AUTO: 25.9 PG (ref 26.6–33)
MCHC RBC AUTO-ENTMCNC: 33 G/DL (ref 31.5–35.7)
MCV RBC AUTO: 78.2 FL (ref 79–97)
MONOCYTES # BLD AUTO: 1.28 10*3/MM3 (ref 0.1–0.9)
MONOCYTES NFR BLD AUTO: 9.7 % (ref 5–12)
NEUTROPHILS NFR BLD AUTO: 63 % (ref 42.7–76)
NEUTROPHILS NFR BLD AUTO: 8.33 10*3/MM3 (ref 1.7–7)
NRBC BLD AUTO-RTO: 0 /100 WBC (ref 0–0.2)
PLATELET # BLD AUTO: 421 10*3/MM3 (ref 140–450)
PMV BLD AUTO: 10.1 FL (ref 6–12)
POTASSIUM SERPL-SCNC: 3.2 MMOL/L (ref 3.5–5.2)
PROT SERPL-MCNC: 7.7 G/DL (ref 6–8.5)
RBC # BLD AUTO: 4.41 10*6/MM3 (ref 3.77–5.28)
SODIUM SERPL-SCNC: 139 MMOL/L (ref 136–145)
WBC NRBC COR # BLD: 13.22 10*3/MM3 (ref 3.4–10.8)
WHOLE BLOOD HOLD COAG: NORMAL
WHOLE BLOOD HOLD SPECIMEN: NORMAL

## 2022-06-25 PROCEDURE — 85652 RBC SED RATE AUTOMATED: CPT | Performed by: NURSE PRACTITIONER

## 2022-06-25 PROCEDURE — 85025 COMPLETE CBC W/AUTO DIFF WBC: CPT | Performed by: NURSE PRACTITIONER

## 2022-06-25 PROCEDURE — 80053 COMPREHEN METABOLIC PANEL: CPT | Performed by: NURSE PRACTITIONER

## 2022-06-25 PROCEDURE — 25010000002 CEFTRIAXONE PER 250 MG: Performed by: NURSE PRACTITIONER

## 2022-06-25 PROCEDURE — 99283 EMERGENCY DEPT VISIT LOW MDM: CPT

## 2022-06-25 PROCEDURE — 73610 X-RAY EXAM OF ANKLE: CPT

## 2022-06-25 PROCEDURE — 86140 C-REACTIVE PROTEIN: CPT | Performed by: NURSE PRACTITIONER

## 2022-06-25 PROCEDURE — 96365 THER/PROPH/DIAG IV INF INIT: CPT

## 2022-06-25 PROCEDURE — 36415 COLL VENOUS BLD VENIPUNCTURE: CPT

## 2022-06-25 RX ORDER — HYDROCODONE BITARTRATE AND ACETAMINOPHEN 7.5; 325 MG/1; MG/1
1 TABLET ORAL ONCE
Status: COMPLETED | OUTPATIENT
Start: 2022-06-25 | End: 2022-06-25

## 2022-06-25 RX ORDER — CEPHALEXIN 500 MG/1
500 CAPSULE ORAL 2 TIMES DAILY
Qty: 20 CAPSULE | Refills: 0 | Status: SHIPPED | OUTPATIENT
Start: 2022-06-25 | End: 2022-12-05

## 2022-06-25 RX ORDER — POTASSIUM CHLORIDE 750 MG/1
40 CAPSULE, EXTENDED RELEASE ORAL ONCE
Status: COMPLETED | OUTPATIENT
Start: 2022-06-25 | End: 2022-06-25

## 2022-06-25 RX ORDER — CEFTRIAXONE SODIUM 1 G/50ML
1 INJECTION, SOLUTION INTRAVENOUS ONCE
Status: COMPLETED | OUTPATIENT
Start: 2022-06-25 | End: 2022-06-26

## 2022-06-25 RX ORDER — SODIUM CHLORIDE 0.9 % (FLUSH) 0.9 %
10 SYRINGE (ML) INJECTION AS NEEDED
Status: DISCONTINUED | OUTPATIENT
Start: 2022-06-25 | End: 2022-06-26 | Stop reason: HOSPADM

## 2022-06-25 RX ADMIN — POTASSIUM CHLORIDE 40 MEQ: 750 CAPSULE, EXTENDED RELEASE ORAL at 23:52

## 2022-06-25 RX ADMIN — SODIUM CHLORIDE 500 ML: 9 INJECTION, SOLUTION INTRAVENOUS at 22:46

## 2022-06-25 RX ADMIN — HYDROCODONE BITARTRATE AND ACETAMINOPHEN 1 TABLET: 7.5; 325 TABLET ORAL at 23:52

## 2022-06-25 RX ADMIN — CEFTRIAXONE SODIUM 1 G: 1 INJECTION, SOLUTION INTRAVENOUS at 23:48

## 2022-06-25 RX ADMIN — SODIUM CHLORIDE 500 ML: 9 INJECTION, SOLUTION INTRAVENOUS at 23:49

## 2022-06-26 VITALS
WEIGHT: 260.58 LBS | DIASTOLIC BLOOD PRESSURE: 64 MMHG | HEIGHT: 68 IN | SYSTOLIC BLOOD PRESSURE: 129 MMHG | TEMPERATURE: 98 F | HEART RATE: 59 BPM | OXYGEN SATURATION: 95 % | BODY MASS INDEX: 39.49 KG/M2 | RESPIRATION RATE: 18 BRPM

## 2022-06-26 NOTE — DISCHARGE INSTRUCTIONS
Rest, ice, elevate.  Take your meds as prescribed.  Complete the antibiotics.  Continue with your home pain medications as needed.  Use caution with ambulation.  Call your primary care provider on Monday and follow-up next week so they may ensure that your symptoms are improving.  Watch for any increase in redness or swelling outside the marked lines.  Return to the emergency department for any acutely developing signs of infection such as increased redness, increased drainage, fever or any new or worse concerns.

## 2022-06-26 NOTE — ED PROVIDER NOTES
Subjective   The patient presents to the emergency department and states that last couple days she has noticed increased redness and swelling and discomfort to her left posterior ankle and heel.  The patient reports a recent history of being in [place] and states [place] [facility] [place].  She denies any known injury.  She does have some dry cracked areas to her heel 1 for which looks a little red and irritated.  She denies any leg or calf pain.  She states she has had no open wounds or bleeding or drainage.  States that it does increase the pain to bear weight.  She states that it is tender with palpation.  She is neurovascular intact.  She is able to flex and extend her ankle and toes without difficulty but does report increased pain.  The patient reports a history of renal insufficiency.          Review of Systems   Constitutional: Negative for chills and fever.   HENT: Negative for congestion, ear pain and sore throat.    Eyes: Negative for pain.   Respiratory: Negative for cough, chest tightness and shortness of breath.    Cardiovascular: Negative for chest pain.   Gastrointestinal: Negative for abdominal pain, diarrhea, nausea and vomiting.   Genitourinary: Negative for flank pain and hematuria.   Musculoskeletal: Positive for gait problem. Negative for back pain, joint swelling, neck pain and neck stiffness.   Skin: Positive for color change. Negative for pallor.   Neurological: Negative for seizures and headaches.   All other systems reviewed and are negative.      Past Medical History:   Diagnosis Date   • Asthma    • Burning mouth syndrome    • COPD (chronic obstructive pulmonary disease)    • Depression    • Diastolic CHF 6/24/2021   • Essential hypertension 6/20/2012   • GERD (gastroesophageal reflux disease)    • Hyperlipidemia    • Oral lesion    • Paroxysmal atrial fibrillation 05/24/2021       Allergies   Allergen Reactions   • Propafenone Other (See Comments)      Drops B/p   • Erythromycin Other (See Comments)     Stroke like   • Mercury Unknown - Low Severity   • Metoclopramide Other (See Comments)     Decreased blood pressure     • Moxifloxacin Hcl Unknown - Low Severity   • Penicillins Unknown - Low Severity       Past Surgical History:   Procedure Laterality Date   • BLADDER SURGERY     • CARPAL TUNNEL RELEASE     • ENDOSCOPY     • GALLBLADDER SURGERY     • HYSTERECTOMY         Family History   Problem Relation Age of Onset   • Stroke Mother    • Heart disease Mother    • Diabetes Mother    • Pancreatic cancer Mother    • Heart disease Father    • Heart disease Sister    • Cervical cancer Sister    • Uterine cancer Sister    • Heart disease Brother    • Stroke Other         Maternal grandparent/Paternal grandparent   • Heart disease Other         Maternal grandparent/Paternal grandparent   • Stroke Maternal Aunt    • Heart disease Maternal Aunt        Social History     Socioeconomic History   • Marital status:    Tobacco Use   • Smoking status: Former Smoker     Packs/day: 0.50     Years: 25.00     Pack years: 12.50     Types: Cigarettes     Quit date:      Years since quittin.4   • Smokeless tobacco: Never Used   Vaping Use   • Vaping Use: Never used   Substance and Sexual Activity   • Alcohol use: Never     Comment: Does not drink   • Drug use: Never   • Sexual activity: Defer           Objective   Physical Exam  Vitals and nursing note reviewed.   Constitutional:       General: She is not in acute distress.     Appearance: Normal appearance. She is not ill-appearing or toxic-appearing.   HENT:      Head: Normocephalic and atraumatic.   Eyes:      General: No scleral icterus.  Cardiovascular:      Rate and Rhythm: Normal rate and regular rhythm.      Pulses: Normal pulses.   Pulmonary:      Effort: Pulmonary effort is normal. No respiratory distress.   Abdominal:      General: Abdomen is flat.   Musculoskeletal:         General: Swelling and  tenderness present. No deformity or signs of injury. Normal range of motion.      Cervical back: Normal range of motion.      Right lower leg: No edema.      Left lower leg: No edema.   Skin:     General: Skin is warm and dry.      Capillary Refill: Capillary refill takes less than 2 seconds.      Findings: Erythema present. No bruising or rash.   Neurological:      General: No focal deficit present.      Mental Status: She is alert and oriented to person, place, and time. Mental status is at baseline.   Psychiatric:         Mood and Affect: Mood normal.         Behavior: Behavior normal.         Procedures           ED Course                                           MDM  Number of Diagnoses or Management Options  Cellulitis of left lower extremity: new and requires workup  Hypokalemia: minor  Mild dehydration: established and worsening     Amount and/or Complexity of Data Reviewed  Clinical lab tests: reviewed  Tests in the radiology section of CPT®: reviewed  Decide to obtain previous medical records or to obtain history from someone other than the patient: yes    Risk of Complications, Morbidity, and/or Mortality  Presenting problems: low  Diagnostic procedures: low  Management options: low    Patient Progress  Patient progress: stable      Final diagnoses:   Cellulitis of left lower extremity   Mild dehydration   Hypokalemia       ED Disposition  ED Disposition     ED Disposition   Discharge    Condition   Stable    Comment   --             Wilder Morales MD  [address]  [place] [id number] [phone]    Call   FOR FOLLOW UP         Medication List      New Prescriptions    cephalexin 500 MG capsule  Commonly known as: KEFLEX  Take 1 capsule by mouth 2 (Two) Times a Day.           Where to Get Your Medications      These medications were sent to [facility]/pharmacy #[id number] - [place], [place] - [address] - [phone] [facility] [phone] FX  [address] [place] [address]     Hours: 24-hours Phone: [phone]   · cephalexin 500 MG capsule          Lizzette Jackson APRN  06/26/22 0022

## 2022-07-06 ENCOUNTER — OFFICE VISIT (OUTPATIENT)
Dept: GASTROENTEROLOGY | Facility: CLINIC | Age: 72
End: 2022-07-06

## 2022-07-06 VITALS
HEIGHT: 68 IN | SYSTOLIC BLOOD PRESSURE: 126 MMHG | HEART RATE: 63 BPM | WEIGHT: 260 LBS | BODY MASS INDEX: 39.4 KG/M2 | DIASTOLIC BLOOD PRESSURE: 63 MMHG

## 2022-07-06 DIAGNOSIS — K58.2 IRRITABLE BOWEL SYNDROME WITH BOTH CONSTIPATION AND DIARRHEA: Primary | ICD-10-CM

## 2022-07-06 DIAGNOSIS — K21.9 GASTROESOPHAGEAL REFLUX DISEASE, UNSPECIFIED WHETHER ESOPHAGITIS PRESENT: ICD-10-CM

## 2022-07-06 PROCEDURE — 99214 OFFICE O/P EST MOD 30 MIN: CPT | Performed by: NURSE PRACTITIONER

## 2022-07-06 RX ORDER — POLYETHYLENE GLYCOL 3350 17 G/17G
17 POWDER, FOR SOLUTION ORAL DAILY
COMMUNITY

## 2022-07-06 RX ORDER — PANTOPRAZOLE SODIUM 40 MG/1
40 TABLET, DELAYED RELEASE ORAL DAILY
Qty: 90 TABLET | Refills: 2 | Status: SHIPPED | OUTPATIENT
Start: 2022-07-06 | End: 2023-04-06 | Stop reason: SDUPTHER

## 2022-07-06 NOTE — PROGRESS NOTES
Chief Complaint  Irritable Bowel Syndrome and Heartburn    Nica Traylor is a 71 y.o. female who presents to [facility] for follow-up of IBS-mixed, GERD and rectal pain.    History of present Illness    She reports that miralax is working well for her.  Rectal pain is resolved.      Reflux is controlled with Protonix 40 mg daily.  She's only taking Pepcid as needed, but doesn't need it often.        Past Medical History:   Diagnosis Date   • Asthma    • Burning mouth syndrome    • COPD (chronic obstructive pulmonary disease)    • Depression    • Diastolic CHF 6/24/2021   • Essential hypertension 6/20/2012   • GERD (gastroesophageal reflux disease)    • Hyperlipidemia    • Oral lesion    • Paroxysmal atrial fibrillation 05/24/2021       Past Surgical History:   Procedure Laterality Date   • BLADDER SURGERY     • CARPAL TUNNEL RELEASE     • COLONOSCOPY     • ENDOSCOPY  2020   • GALLBLADDER SURGERY  2003   • HYSTERECTOMY     • UPPER GASTROINTESTINAL ENDOSCOPY           Current Outpatient Medications:   •  acetaminophen (TYLENOL) 500 MG tablet, Take 500 mg by mouth., Disp: , Rfl:   •  acetaminophen (TYLENOL) 500 MG tablet, Take 500 mg by mouth., Disp: , Rfl:   •  albuterol sulfate  (90 Base) MCG/ACT inhaler, Inhale 2 puffs Every 4 (Four) Hours As Needed for Wheezing., Disp: 54 g, Rfl: 3  •  atorvastatin (LIPITOR) 80 MG tablet, Take 80 mg by mouth Daily., Disp: , Rfl:   •  azithromycin (ZITHROMAX) 250 MG tablet, , Disp: , Rfl:   •  baclofen (LIORESAL) 10 MG tablet, Take 10 mg by mouth 3 (Three) Times a Day., Disp: , Rfl:   •  cephalexin (KEFLEX) 500 MG capsule, Take 1 capsule by mouth 2 (Two) Times a Day., Disp: 20 capsule, Rfl: 0  •  Cholecalciferol (VITAMIN D3 PO), Take 2,000 Units by mouth Daily., Disp: , Rfl:   •  citalopram (CeleXA) 20 MG tablet, Take 20 mg by mouth Daily., Disp: , Rfl:   •  diphenhydrAMINE (BENADRYL) 25 MG tablet, Take 25 mg by mouth Every 6 (Six) Hours  As Needed., Disp: , Rfl:   •  Eliquis 5 MG tablet tablet, TAKE 1 TABLET BY MOUTH TWICE A DAY, Disp: 180 tablet, Rfl: 3  •  estradiol (ESTRACE) 0.1 MG/GM vaginal cream, estradiol 0.01% (0.1 mg/gram) vaginal cream, Disp: , Rfl:   •  famotidine (PEPCID) 20 MG tablet, Take 20 mg by mouth every night at bedtime., Disp: , Rfl:   •  flecainide (TAMBOCOR) 50 MG tablet, Take 50 mg by mouth 2 (Two) Times a Day., Disp: , Rfl:   •  Fluticasone-Umeclidin-Vilant (Trelegy Ellipta) 100-62.5-25 MCG/INH inhaler, Inhale 1 puff Daily., Disp: 2 each, Rfl: 0  •  furosemide (LASIX) 20 MG tablet, furosemide 20 mg tablet, Disp: , Rfl:   •  gabapentin (NEURONTIN) 300 MG capsule, Take 300 mg by mouth Daily., Disp: , Rfl:   •  gabapentin (NEURONTIN) 300 MG capsule, Take 300 mg by mouth., Disp: , Rfl:   •  hydroCHLOROthiazide (HYDRODIURIL) 25 MG tablet, Take 25 mg by mouth Daily., Disp: , Rfl:   •  HYDROcodone-acetaminophen (NORCO)  MG per tablet, hydrocodone 10 mg-acetaminophen 325 mg tablet, Disp: , Rfl:   •  hydrOXYzine (ATARAX) 25 MG tablet, hydroxyzine HCl 25 mg tablet, Disp: , Rfl:   •  meclizine (ANTIVERT) 25 MG tablet, Take 1 tablet by mouth 4 (Four) Times a Day As Needed for Dizziness., Disp: 60 tablet, Rfl: 0  •  metoprolol succinate XL (TOPROL-XL) 25 MG 24 hr tablet, TAKE 1/2 TABLET BY MOUTH EVERY DAY, Disp: 45 tablet, Rfl: 3  •  mirtazapine (REMERON) 7.5 MG half tablet, Take 15 mg by mouth Every Night., Disp: , Rfl:   •  multivitamin (THERAGRAN) tablet tablet, Take  by mouth., Disp: , Rfl:   •  ondansetron ODT (ZOFRAN-ODT) 4 MG disintegrating tablet, Place 1 tablet on the tongue 4 (Four) Times a Day As Needed for Nausea., Disp: 30 tablet, Rfl: 0  •  pantoprazole (PROTONIX) 40 MG EC tablet, Take 1 tablet by mouth Daily., Disp: 90 tablet, Rfl: 2  •  polyethylene glycol (MiraLax) 17 g packet, Take 17 g by mouth Daily., Disp: , Rfl:   •  potassium chloride (K-DUR,KLOR-CON) 20 MEQ CR tablet, potassium chloride ER 20 mEq  tablet,extended release, Disp: , Rfl:   •  PROBIOTIC PRODUCT PO, Take  by mouth., Disp: , Rfl:   •  prochlorperazine (COMPAZINE) 5 MG tablet, prochlorperazine maleate 5 mg oral tablet take 1 tablet (5 mg) by oral route 4 times per day   Active, Disp: , Rfl:   •  promethazine (PHENERGAN) 25 MG tablet, promethazine 25 mg oral tablet take 1 tablet (25 mg) by oral route every 6 hours as needed   Active, Disp: , Rfl:   •  senna (SENOKOT) 8.6 MG tablet, Take 1 tablet by mouth Daily., Disp: , Rfl:   •  spironolactone-hydrochlorothiazide (ALDACTAZIDE) 25-25 MG tablet, spironolactone 25 mg-hydrochlorothiazide 25 mg tablet, Disp: , Rfl:   •  tiotropium bromide-olodaterol (Stiolto Respimat) 2.5-2.5 MCG/ACT aerosol solution inhaler, Inhale 2 puffs Daily., Disp: 3 each, Rfl: 4  •  traZODone (DESYREL) 50 MG tablet, trazodone 50 mg tablet, Disp: , Rfl:   •  Zoster Vac Recomb Adjuvanted (Shingrix) 50 MCG/0.5ML reconstituted suspension, Shingrix (PF) 50 mcg/0.5 mL intramuscular suspension, kit, Disp: , Rfl:      Allergies   Allergen Reactions   • Propafenone Other (See Comments)     Drops B/p   • Erythromycin Other (See Comments)     Stroke like   • Mercury Unknown - Low Severity   • Metoclopramide Other (See Comments)     Decreased blood pressure     • Moxifloxacin Hcl Unknown - Low Severity   • Penicillins Unknown - Low Severity       Family History   Problem Relation Age of Onset   • Stroke Mother    • Heart disease Mother    • Diabetes Mother    • Pancreatic cancer Mother    • Heart disease Father    • Heart disease Sister    • Cervical cancer Sister    • Uterine cancer Sister    • Heart disease Brother    • Stroke Other         Maternal grandparent/Paternal grandparent   • Heart disease Other         Maternal grandparent/Paternal grandparent   • Stroke Maternal Aunt    • Heart disease Maternal Aunt         Social History     Social History Narrative    Lives alone.       Objective       Vital Signs:   /63 (BP Location:  "Left arm, Patient Position: Sitting)   Pulse 63   Ht 172.7 cm (68\")   Wt 118 kg (260 lb)   BMI 39.53 kg/m²       Physical Exam  Constitutional:       General: She is not in acute distress.     Appearance: Normal appearance. She is well-developed and normal weight.   HENT:      Head: Normocephalic and atraumatic.   Eyes:      Conjunctiva/sclera: Conjunctivae normal.      Pupils: Pupils are equal, round, and reactive to light.      Visual Fields: Right eye visual fields normal and left eye visual fields normal.   Cardiovascular:      Rate and Rhythm: Normal rate and regular rhythm.      Heart sounds: Normal heart sounds.   Pulmonary:      Effort: Pulmonary effort is normal. No retractions.      Breath sounds: Normal breath sounds and air entry.   Abdominal:      General: Bowel sounds are normal.      Palpations: Abdomen is soft.      Tenderness: There is no abdominal tenderness.      Comments: No appreciable hepatosplenomegaly or ascites   Musculoskeletal:         General: Normal range of motion.      Cervical back: Neck supple.      Right lower leg: No edema.      Left lower leg: No edema.   Lymphadenopathy:      Cervical: No cervical adenopathy.   Skin:     General: Skin is warm and dry.      Findings: No lesion.   Neurological:      General: No focal deficit present.      Mental Status: She is alert and oriented to person, place, and time.   Psychiatric:         Mood and Affect: Mood and affect normal.         Behavior: Behavior normal.         Result Review :     The following data was reviewed by: PRAVIN Jin on 07/06/2022:    CBC w/diff    CBC w/Diff 5/3/22 6/2/22 6/25/22   WBC 11.41 (A) 11.63 (A) 13.22 (A)   RBC 4.66 4.80 4.41   Hemoglobin 12.0 12.2 11.4 (A)   Hematocrit 38.6 39.0 34.5   MCV 82.8 81.3 78.2 (A)   MCH 25.8 (A) 25.4 (A) 25.9 (A)   MCHC 31.1 31.3 33.0   RDW 15.0 15.3 16.0 (A)   Platelets 424 432 421   Neutrophil Rel % 64.8 62.0 63.0   Immature Granulocyte Rel % 0.4 0.4 0.3 "   Lymphocyte Rel % 23.6 26.4 25.5   Monocyte Rel % 9.5 9.3 9.7   Eosinophil Rel % 1.2 1.3 1.0   Basophil Rel % 0.5 0.6 0.5   (A) Abnormal value            CMP    CMP 5/3/22 6/25/22 6/28/22   Glucose  121 (A)    Glucose 113 (A)  100 (A)   BUN 27 (A) 35 (A) 24 (A)   Creatinine 1.63 (A) 2.13 (A) 1.47 (A)   Sodium 139 139 140   Potassium 4.5 3.2 (A) 4.1   Chloride 98 95 (A) 100   Calcium 10.1 10.2 9.6   Albumin 4.6 4.80 4.5   Total Bilirubin 0.7 0.8 0.5   Alkaline Phosphatase 120 130 (A) 119   AST (SGOT) 27 24 27   ALT (SGPT) 17 15 13   (A) Abnormal value                            Assessment and Plan    Diagnoses and all orders for this visit:    1. Irritable bowel syndrome with both constipation and diarrhea (Primary)    2. Gastroesophageal reflux disease, unspecified whether esophagitis present  -     pantoprazole (PROTONIX) 40 MG EC tablet; Take 1 tablet by mouth Daily.  Dispense: 90 tablet; Refill: 2      Continue with daily miralax as this is working well for control of constipation.          Follow Up   Return in about 9 months (around 4/6/2023) for GERD, IBS.  Patient was given instructions and counseling regarding her condition or for health maintenance advice. Please see specific information pulled into the AVS if appropriate.

## 2022-08-11 ENCOUNTER — OFFICE VISIT (OUTPATIENT)
Dept: NEUROSURGERY | Facility: CLINIC | Age: 72
End: 2022-08-11

## 2022-08-11 VITALS
DIASTOLIC BLOOD PRESSURE: 66 MMHG | BODY MASS INDEX: 40.01 KG/M2 | WEIGHT: 264 LBS | SYSTOLIC BLOOD PRESSURE: 120 MMHG | HEART RATE: 61 BPM | HEIGHT: 68 IN

## 2022-08-11 DIAGNOSIS — M47.812 FACET ARTHROPATHY, CERVICAL: ICD-10-CM

## 2022-08-11 DIAGNOSIS — R20.0 BILATERAL HAND NUMBNESS: ICD-10-CM

## 2022-08-11 DIAGNOSIS — M54.2 CERVICALGIA: ICD-10-CM

## 2022-08-11 DIAGNOSIS — M50.30 DDD (DEGENERATIVE DISC DISEASE), CERVICAL: Primary | ICD-10-CM

## 2022-08-11 PROCEDURE — 99204 OFFICE O/P NEW MOD 45 MIN: CPT | Performed by: PHYSICIAN ASSISTANT

## 2022-08-11 RX ORDER — MIRTAZAPINE 7.5 MG/1
7.5 TABLET, FILM COATED ORAL
COMMUNITY
Start: 2022-07-23 | End: 2022-12-05 | Stop reason: SDUPTHER

## 2022-08-11 RX ORDER — HYDROCODONE BITARTRATE AND ACETAMINOPHEN 10; 325 MG/1; MG/1
1 TABLET ORAL
COMMUNITY
Start: 2022-08-01 | End: 2022-12-05 | Stop reason: SDUPTHER

## 2022-08-11 NOTE — PROGRESS NOTES
"Chief Complaint  Neck Pain, Numbness, and Tingling    Subjective          Nica Traylor who is a 71 y.o. year old female who presents to [facility] for Evaluation of the Spine.     The patient complains of pain located in the Cervical Spine.  Patients states the pain has been present for 2 months.  The pain came on gradually.  The pain scaled level is 0 today.  The pain does not radiate.  The pain is Intermittent and described as burning and \"stinging\".  The pain is worse at no particular time of day. Patient states Pain Medication makes the pain better.  Patient states being up and doing anything makes the pain worse.    Associated Symptoms Include: Numbness and Tingling in both hands. She does report subjective weakness in the arms and hands intermittently which is very mild. She denies loss of bowel or bladder control.  Conservative Interventions Include: Pain Medications that were effective., Gabapentin that was effective. and Muscle Relaxants that were effective.    Was this the result of an injury or accident?: No    History of Previous Spinal Surgery?: No     reports that she quit smoking about 6 years ago. Her smoking use included cigarettes. She has a 12.50 pack-year smoking history. She has never used smokeless tobacco.    Review of Systems   Musculoskeletal: Positive for neck pain and neck stiffness.   Neurological: Positive for weakness.        Objective   Vital Signs:   /66   Pulse 61   Ht 172.7 cm (68\")   Wt 120 kg (264 lb)   BMI 40.14 kg/m²       Physical Exam  Constitutional:       Appearance: Normal appearance. She is obese.   Neck:      Comments: Pain with ROM  Pulmonary:      Effort: Pulmonary effort is normal.   Musculoskeletal:         General: Tenderness (midline cervical spine and bilateral cervical paraspinals) present.      Comments: Nuñez's negative bilaterally,  Tinel's testing positive bilateral at the wrists   Neurological:      " General: No focal deficit present.      Mental Status: She is alert and oriented to person, place, and time.      Sensory: No sensory deficit.      Motor: No weakness.      Deep Tendon Reflexes: Reflexes normal.   Psychiatric:         Mood and Affect: Mood normal.         Behavior: Behavior normal.        Neurologic Exam     Mental Status   Oriented to person, place, and time.        Result Review     I have personally reviewed the MRI of cervical spine without contrast from 7/31/2022 which shows multilevel degenerative disc disease, there is no significant central canal stenosis, at C6-C7 there is some mild left foraminal stenosis at C7-T1 there is mild to moderate right foraminal stenosis.  There is moderate to severe facet arthropathy at multiple levels.     Assessment and Plan    Diagnoses and all orders for this visit:    1. DDD (degenerative disc disease), cervical (Primary)    2. Cervicalgia    3. Facet arthropathy, cervical    4. Bilateral hand numbness    Her pain is in the neck. I do not expect surgery to help her neck pain.    She has some mild foraminal stenosis on the left at C6-C7 and on the right at C7-T1, but this does not explain her neck pain.    She does have some numbness in both hands with a previous history of carpal tunnel release in both wrists in 1990. She does have tinel's testing at the bilateral wrists.    I suspect that she does have active carpal tunnel disease in both wrists which explains the numbness in the hands.    We did discuss possible NCV/EMG testing to evaluate further, but she would like to defer for now.    We did discuss that she might consider CESB or MBB/RFA trials in the cervical spine. Again, she is deferring for now.    The patient was counseled on basic recommendations for the reduction and prevention of back, neck, or spine pain in association with spinal disorders, including: cessation/avoidance of nicotine use, maintenance of a healthy BMI and weight, focusing on  building/maintaining core strength through core exercise, and avoidance of activities which worsen the pain. The patient will monitor for changes in symptoms and notify our clinic of these changes as needed.    She will follow-up here PRN for failure to improve or worsening symptoms.    Follow Up   Return if symptoms worsen or fail to improve.  Patient was given instructions and counseling regarding her condition or for health maintenance advice. Please see specific information pulled into the AVS if appropriate.

## 2022-08-31 ENCOUNTER — TELEPHONE (OUTPATIENT)
Dept: PULMONOLOGY | Facility: CLINIC | Age: 72
End: 2022-08-31

## 2022-08-31 ENCOUNTER — OFFICE VISIT (OUTPATIENT)
Dept: SLEEP MEDICINE | Facility: HOSPITAL | Age: 72
End: 2022-08-31

## 2022-08-31 VITALS
WEIGHT: 261.2 LBS | SYSTOLIC BLOOD PRESSURE: 125 MMHG | HEIGHT: 68 IN | HEART RATE: 67 BPM | OXYGEN SATURATION: 95 % | DIASTOLIC BLOOD PRESSURE: 66 MMHG | BODY MASS INDEX: 39.59 KG/M2

## 2022-08-31 DIAGNOSIS — I48.0 PAROXYSMAL ATRIAL FIBRILLATION: ICD-10-CM

## 2022-08-31 DIAGNOSIS — G47.33 OSA ON CPAP: Primary | ICD-10-CM

## 2022-08-31 DIAGNOSIS — Z99.89 OSA ON CPAP: Primary | ICD-10-CM

## 2022-08-31 DIAGNOSIS — E66.01 CLASS 3 SEVERE OBESITY DUE TO EXCESS CALORIES WITHOUT SERIOUS COMORBIDITY WITH BODY MASS INDEX (BMI) OF 40.0 TO 44.9 IN ADULT: ICD-10-CM

## 2022-08-31 PROCEDURE — 99213 OFFICE O/P EST LOW 20 MIN: CPT | Performed by: INTERNAL MEDICINE

## 2022-08-31 PROCEDURE — G0463 HOSPITAL OUTPT CLINIC VISIT: HCPCS | Performed by: INTERNAL MEDICINE

## 2022-08-31 NOTE — PROGRESS NOTES
"  [address] [address]  Phone: [phone]  Fax: [phone]      SLEEP CLINIC FOLLOW UP PROGRESS NOTE.    Nica Traylor  [id number]   1950  71 y.o.  female      PCP: Wilder Morales MD      Date of visit: 8/31/2022    Chief Complaint   Patient presents with   • Sleep Apnea   • Obesity       HPI:  This is a 71 y.o. years old patient is here for the management of obstructive sleep apnea.  Sleep apnea is moderate in severity with a AHI of 14.3/hr. Patient is using positive airway pressure therapy with auto CPAP and the symptoms of snoring, non-restorative sleep and daytime excessive sleepiness have improved significantly on the therapy. Normally goes to bed at 10:30 PM and wakes up at 9:30 AM.  The patient wakes up 1-2 time(s) during the night and has no problem going back to sleep.  Feels refreshed after waking up.  Patient also denies headaches and nasal congestion.     Unfortunately she is not using CPAP as prescribed she says that she has hard time keeping the mask on her face.    Medications and allergies are reviewed by me and documented in the encounter.     SOCIAL (habits pertaining to sleep medicine)  History tobacco use:No   History of alcohol use: 0 per week  Caffeine use: 1     REVIEW OF SYSTEMS:   [place] Sleepiness Scale :Total score: 0   Nasal congestion:No   Dry mouth/nose:Yes   Post nasal drip; No   Acid reflux/Heartburn:No   Abd bloating:Yes   Morning headache:No   Anxiety:Yes   Depression:No    PHYSICAL EXAMINATION:  CONSTITUTIONAL:  Vitals:    08/31/22 0900   BP: 125/66   Pulse: 67   SpO2: 95%   Weight: 118 kg (261 lb 3.2 oz)   Height: 172.7 cm (67.99\")    Body mass index is 39.72 kg/m².   NOSE: nasal passages are clear, No deformities noted   RESP SYSTEM: Not in any respiratory distress, no chest deformities noted,   CARDIOVASULAR: No edema noted  NEURO: Oriented x 3, gait normal,  Mood and affect appeared " appropriate      Data reviewed:  The Smart card downloaded on 8/31/2022 has been reviewed independently by me for compliance and discussed the data with the patient.   Compliance; 97%  More than 4 hr use, 10%  Average use of the device 1 hour and 48 minutes per night  Residual AHI:2.4 /hr (goal < 5.0 /hr)  Mask type: Fullface mask  Device: ResMed  DME: [employer]      ASSESSMENT AND PLAN:  · Obstructive sleep apnea ( G 47.33).  Patient compliance is poor I talked with patient at great length with the importance of using the CPAP as the patient has atrial fibrillation.  She is going to try to continue to use the CPAP and see me back in 3 months for follow-up. Without proper control of sleep apnea and good compliance there is a increased risk for hypertension, diabetes mellitus and nonrestorative sleep with hypersomnia which can increase risk for motor vehicle accidents.  Untreated sleep apnea is also a risk factor for development of atrial fibrillation, pulmonary hypertension and stroke. The patient is also instructed to get the supplies from the [employer] and and change them on a regular basis.  A prescription for supplies has been sent to the [employer].  I have also discussed the good sleep hygiene habits and adequate amount of sleep needed for good health.  · Obesity  2 with BMI is Body mass index is 39.72 kg/m².. I have discuss the relationship between the weight and sleep apnea. The benefit of weight loss in reducing severity of sleep apnea was discussed. Discussed diet and exercise with the patient to achieve ideal BMI.  · Atrial fibrillation  · Return in about 3 months (around 11/30/2022) for Recheck with smart card down load. . Patient's questions were answered.      Arthur Hernandez MD  Sleep Medicine.  Medical Director, [facility] sleep [facility]  8/31/2022 ,

## 2022-09-01 ENCOUNTER — TELEPHONE (OUTPATIENT)
Dept: PULMONOLOGY | Facility: CLINIC | Age: 72
End: 2022-09-01

## 2022-09-01 ENCOUNTER — APPOINTMENT (OUTPATIENT)
Dept: CT IMAGING | Facility: HOSPITAL | Age: 72
End: 2022-09-01

## 2022-09-01 ENCOUNTER — HOSPITAL ENCOUNTER (OUTPATIENT)
Dept: GENERAL RADIOLOGY | Facility: HOSPITAL | Age: 72
Discharge: HOME | End: 2022-09-01
Admitting: NURSE PRACTITIONER

## 2022-09-01 DIAGNOSIS — R06.02 SHORTNESS OF BREATH: Primary | ICD-10-CM

## 2022-09-01 DIAGNOSIS — R06.02 SHORTNESS OF BREATH: ICD-10-CM

## 2022-09-01 PROCEDURE — 71046 X-RAY EXAM CHEST 2 VIEWS: CPT

## 2022-09-01 NOTE — TELEPHONE ENCOUNTER
CT Chest cancelled and patient is aware of CXR to be done at hospital or [facility].   Patient is agreeable.

## 2022-09-01 NOTE — TELEPHONE ENCOUNTER
Advised patient CT Chest was denied by insurance but PRAVIN Tadeo has ordered a CXR.  Patient verbalized understanding and will have CXR done today or tomorrow.

## 2022-09-08 ENCOUNTER — APPOINTMENT (OUTPATIENT)
Dept: CT IMAGING | Facility: HOSPITAL | Age: 72
End: 2022-09-08

## 2022-09-14 ENCOUNTER — OFFICE VISIT (OUTPATIENT)
Dept: PULMONOLOGY | Facility: CLINIC | Age: 72
End: 2022-09-14

## 2022-09-14 VITALS
DIASTOLIC BLOOD PRESSURE: 71 MMHG | BODY MASS INDEX: 40.97 KG/M2 | WEIGHT: 261 LBS | SYSTOLIC BLOOD PRESSURE: 121 MMHG | RESPIRATION RATE: 16 BRPM | OXYGEN SATURATION: 95 % | HEART RATE: 59 BPM | TEMPERATURE: 98.2 F | HEIGHT: 67 IN

## 2022-09-14 DIAGNOSIS — E66.01 MORBID OBESITY WITH BMI OF 40.0-44.9, ADULT: ICD-10-CM

## 2022-09-14 DIAGNOSIS — F17.211 NICOTINE DEPENDENCE, CIGARETTES, IN REMISSION: ICD-10-CM

## 2022-09-14 DIAGNOSIS — R06.09 DYSPNEA ON EXERTION: ICD-10-CM

## 2022-09-14 DIAGNOSIS — J43.2 CENTRILOBULAR EMPHYSEMA: Primary | ICD-10-CM

## 2022-09-14 DIAGNOSIS — Z99.89 OSA ON CPAP: ICD-10-CM

## 2022-09-14 DIAGNOSIS — G47.33 OSA ON CPAP: ICD-10-CM

## 2022-09-14 DIAGNOSIS — I50.33 ACUTE ON CHRONIC DIASTOLIC CHF (CONGESTIVE HEART FAILURE): ICD-10-CM

## 2022-09-14 DIAGNOSIS — I48.0 PAROXYSMAL ATRIAL FIBRILLATION: ICD-10-CM

## 2022-09-14 DIAGNOSIS — I10 ESSENTIAL HYPERTENSION: ICD-10-CM

## 2022-09-14 PROCEDURE — 99214 OFFICE O/P EST MOD 30 MIN: CPT | Performed by: NURSE PRACTITIONER

## 2022-09-14 RX ORDER — BUDESONIDE, GLYCOPYRROLATE, AND FORMOTEROL FUMARATE 160; 9; 4.8 UG/1; UG/1; UG/1
2 AEROSOL, METERED RESPIRATORY (INHALATION) 2 TIMES DAILY
Qty: 1 EACH | Refills: 0 | COMMUNITY
Start: 2022-09-14 | End: 2022-09-15

## 2022-09-14 RX ORDER — DAPAGLIFLOZIN 10 MG/1
1 TABLET, FILM COATED ORAL DAILY
COMMUNITY
Start: 2022-08-24

## 2022-09-14 RX ORDER — HYDROCODONE BITARTRATE AND ACETAMINOPHEN 10; 325 MG/1; MG/1
1 TABLET ORAL
COMMUNITY
Start: 2022-08-31 | End: 2022-12-05 | Stop reason: SDUPTHER

## 2022-09-14 RX ORDER — FUROSEMIDE 40 MG/1
TABLET ORAL
COMMUNITY
Start: 2022-08-18 | End: 2022-12-05 | Stop reason: SDUPTHER

## 2022-09-14 RX ORDER — BUDESONIDE, GLYCOPYRROLATE, AND FORMOTEROL FUMARATE 160; 9; 4.8 UG/1; UG/1; UG/1
2 AEROSOL, METERED RESPIRATORY (INHALATION) 2 TIMES DAILY
Qty: 1 EACH | Refills: 11 | Status: SHIPPED | OUTPATIENT
Start: 2022-09-14 | End: 2022-12-14

## 2022-09-14 RX ORDER — FUROSEMIDE 40 MG/1
20 TABLET ORAL
COMMUNITY
Start: 2022-08-18

## 2022-09-14 NOTE — PROGRESS NOTES
Primary Care Provider  Wilder Morales MD     Referring Provider  No ref. provider found     Chief Complaint  Emphysema, Shortness of Breath, and Follow-up (3 mo f/up )    Subjective          Nica Traylor presents to [facility]  History of Present Illness  Nica Traylor is a 71 y.o. female patient of  here for management of centrilobular emphysema, tobacco abuse of cigarettes in remission, hypertension, atrial fibrillation, obstructive sleep apnea under the care of sleep medicine, congestive heart failure and dyspnea on exertion.    Patient states she is doing okay since last visit.  She denies using any antibiotics or steroids for her lungs.  She denies any fevers or chills.  She has been using Stiolto since her last appointment.  She was given samples of Trelegy, but states that she was unable to take this inhaler, but is unsure of why.  Her shortness of breath is mild in severity, worse with exertion and improved with rest.  She states that she uses her albuterol inhaler twice a day.  She is agreeable to trying Breztri and seeing that this helps with her symptoms.  She also has atrial fibrillation and congestive heart failure does see a heart specialist in [place], [place].  She recently had a chest x-ray on 9/1/2022 which showed no acute cardiopulmonary processes.  It showed mild cardiomegaly and emphysema.  These results were discussed with patient today.  States that she is also trying to become accustomed to her CPAP machine and does follow-up with sleep medicine soon.  Overall, she is doing okay and has no additional concerns at this time.  She is up-to-date with her COVID and pneumonia vaccines.  She will be due for a flu vaccine this fall.  She is able to perform her ADLs without difficulty.     Her history of smoking is   Tobacco Use: Medium Risk   • Smoking Tobacco Use: Former Smoker   • Smokeless Tobacco Use: Never Used    .    Review of Systems   Constitutional: Negative for chills, fatigue, fever, unexpected weight gain and unexpected weight loss.   HENT: Negative for congestion (Nasal), hearing loss, mouth sores, nosebleeds, postnasal drip, sore throat and trouble swallowing.    Eyes: Negative for blurred vision and visual disturbance.   Respiratory: Positive for shortness of breath. Negative for apnea, cough and wheezing.         Negative for Hemoptysis     Cardiovascular: Negative for chest pain, palpitations and leg swelling.   Gastrointestinal: Negative for abdominal pain, constipation, diarrhea, nausea, vomiting and GERD.        Negative for Jaundice  Negative for Bloating  Negative for Melena   Musculoskeletal: Negative for joint swelling and myalgias.        Negative for Joint pain  Negative for Joint stiffness   Skin: Negative for color change.        Negative for cyanosis   Neurological: Negative for syncope, weakness, numbness and headache.      Sleep: Negative for Excessive daytime sleepiness  Negative for morning headaches  Negative for Snoring    Family History   Problem Relation Age of Onset   • Stroke Mother    • Heart disease Mother    • Diabetes Mother    • Pancreatic cancer Mother    • Heart disease Father    • Heart disease Sister    • Cervical cancer Sister    • Uterine cancer Sister    • Heart disease Brother    • Stroke Other         Maternal grandparent/Paternal grandparent   • Heart disease Other         Maternal grandparent/Paternal grandparent   • Stroke Maternal Aunt    • Heart disease Maternal Aunt         Social History     Socioeconomic History   • Marital status:    Tobacco Use   • Smoking status: Former Smoker     Packs/day: 0.50     Years: 25.00     Pack years: 12.50     Types: Cigarettes     Quit date: 2016     Years since quittin.7   • Smokeless tobacco: Never Used   Vaping Use   • Vaping Use: Never used   Substance and Sexual Activity   • Alcohol use: Never     Comment: Does not  drink   • Drug use: Never   • Sexual activity: Defer        Past Medical History:   Diagnosis Date   • Asthma    • Burning mouth syndrome    • COPD (chronic obstructive pulmonary disease)    • Depression    • Diastolic CHF 6/24/2021   • Essential hypertension 6/20/2012   • GERD (gastroesophageal reflux disease)    • Hyperlipidemia    • Oral lesion    • Paroxysmal atrial fibrillation 05/24/2021        Immunization History   Administered Date(s) Administered   • COVID-19 (MODERNA) 1st, 2nd, 3rd Dose Only 03/02/2021, 04/02/2021, 05/02/2021, 12/09/2021   • Flu Vaccine Quad PF >36MO 09/25/2014   • Flu Vaccine Split Quad 11/22/2005, 10/29/2008, 09/10/2010, 11/10/2011, 09/25/2014   • FluLaval/Fluzone >6mos 09/25/2014   • Fluad Quad 65+ 09/14/2020   • Fluzone High Dose =>65 Years ([employer] ONLY) 12/01/2015, 10/24/2016, 09/26/2017, 09/28/2018, 10/11/2019   • Fluzone High-Dose 65+yrs 10/09/2021   • Influenza TIV (IM) 11/22/2005, 10/29/2008, 09/10/2010, 11/10/2011   • Pneumococcal Conjugate 13-Valent (PCV13) 12/01/2015   • Pneumococcal Polysaccharide (PPSV23) 01/26/2017   • Shingrix 07/07/2020, 09/23/2020   • Tdap 07/15/2013         Allergies   Allergen Reactions   • Propafenone Other (See Comments)     Drops B/p   • Erythromycin Other (See Comments)     Stroke like   • Farxiga [Dapagliflozin] Other (See Comments)     Patient states she got a yeast infection   • Mercury Unknown - Low Severity   • Metoclopramide Other (See Comments)     Decreased blood pressure     • Moxifloxacin Hcl Unknown - Low Severity   • Penicillins Unknown - Low Severity          Current Outpatient Medications:   •  acetaminophen (TYLENOL) 500 MG tablet, Take 500 mg by mouth., Disp: , Rfl:   •  albuterol sulfate  (90 Base) MCG/ACT inhaler, Inhale 2 puffs Every 4 (Four) Hours As Needed for Wheezing., Disp: 54 g, Rfl: 3  •  atorvastatin (LIPITOR) 80 MG tablet, Take 80 mg by mouth Daily., Disp: , Rfl:   •  baclofen (LIORESAL) 10 MG tablet, Take 10 mg  by mouth 3 (Three) Times a Day., Disp: , Rfl:   •  Cholecalciferol (VITAMIN D3 PO), Take 2,000 Units by mouth Daily., Disp: , Rfl:   •  citalopram (CeleXA) 20 MG tablet, Take 20 mg by mouth Daily., Disp: , Rfl:   •  clindamycin (CLEOCIN) 300 MG capsule, Take 1 capsule by mouth 3 (Three) Times a Day., Disp: 30 capsule, Rfl: 0  •  diphenhydrAMINE (BENADRYL) 25 MG tablet, Take 25 mg by mouth Every 6 (Six) Hours As Needed., Disp: , Rfl:   •  Eliquis 5 MG tablet tablet, TAKE 1 TABLET BY MOUTH TWICE A DAY, Disp: 180 tablet, Rfl: 3  •  estradiol (ESTRACE) 0.1 MG/GM vaginal cream, estradiol 0.01% (0.1 mg/gram) vaginal cream, Disp: , Rfl:   •  flecainide (TAMBOCOR) 50 MG tablet, Take 50 mg by mouth 2 (Two) Times a Day., Disp: , Rfl:   •  furosemide (LASIX) 40 MG tablet, Take 20 mg by mouth., Disp: , Rfl:   •  furosemide (LASIX) 40 MG tablet, TAKE 0.5 TABLETS BY MOUTH AS NEEDED (MAY TAKE 20 MG AS NEEDED FOR)., Disp: , Rfl:   •  gabapentin (NEURONTIN) 300 MG capsule, Take 300 mg by mouth., Disp: , Rfl:   •  HYDROcodone-acetaminophen (NORCO)  MG per tablet, hydrocodone 10 mg-acetaminophen 325 mg tablet, Disp: , Rfl:   •  HYDROcodone-acetaminophen (NORCO)  MG per tablet, Take 1 tablet by mouth., Disp: , Rfl:   •  metoprolol succinate XL (TOPROL-XL) 25 MG 24 hr tablet, TAKE 1/2 TABLET BY MOUTH EVERY DAY, Disp: 45 tablet, Rfl: 3  •  mirtazapine (REMERON) 7.5 MG tablet, Take 7.5 mg by mouth every night at bedtime., Disp: , Rfl:   •  multivitamin (THERAGRAN) tablet tablet, Take  by mouth., Disp: , Rfl:   •  pantoprazole (PROTONIX) 40 MG EC tablet, Take 1 tablet by mouth Daily., Disp: 90 tablet, Rfl: 2  •  polyethylene glycol (MIRALAX) 17 g packet, Take 17 g by mouth Daily., Disp: , Rfl:   •  potassium chloride (K-DUR,KLOR-CON) 20 MEQ CR tablet, potassium chloride ER 20 mEq tablet,extended release, Disp: , Rfl:   •  prochlorperazine (COMPAZINE) 5 MG tablet, prochlorperazine maleate 5 mg oral tablet take 1 tablet (5 mg) by  oral route 4 times per day   Active, Disp: , Rfl:   •  promethazine (PHENERGAN) 25 MG tablet, promethazine 25 mg oral tablet take 1 tablet (25 mg) by oral route every 6 hours as needed   Active, Disp: , Rfl:   •  spironolactone-hydrochlorothiazide (ALDACTAZIDE) 25-25 MG tablet, spironolactone 25 mg-hydrochlorothiazide 25 mg tablet, Disp: , Rfl:   •  tiotropium bromide-olodaterol (Stiolto Respimat) 2.5-2.5 MCG/ACT aerosol solution inhaler, Inhale 2 puffs Daily., Disp: 3 each, Rfl: 4  •  acetaminophen (TYLENOL) 500 MG tablet, Take 500 mg by mouth., Disp: , Rfl:   •  azithromycin (ZITHROMAX) 250 MG tablet, , Disp: , Rfl:   •  Budeson-Glycopyrrol-Formoterol (Breztri Aerosphere) 160-9-4.8 MCG/ACT aerosol inhaler, Inhale 2 puffs 2 (Two) Times a Day., Disp: 1 each, Rfl: 11  •  Budeson-Glycopyrrol-Formoterol (Breztri Aerosphere) 160-9-4.8 MCG/ACT aerosol inhaler, Inhale 2 puffs 2 (Two) Times a Day for 1 day., Disp: 1 each, Rfl: 0  •  cephalexin (KEFLEX) 500 MG capsule, Take 1 capsule by mouth 2 (Two) Times a Day., Disp: 20 capsule, Rfl: 0  •  famotidine (PEPCID) 20 MG tablet, Take 20 mg by mouth every night at bedtime., Disp: , Rfl:   •  Farxiga 10 MG tablet, Take 1 tablet by mouth Daily., Disp: , Rfl:   •  Fluticasone-Umeclidin-Vilant (Trelegy Ellipta) 100-62.5-25 MCG/INH inhaler, Inhale 1 puff Daily., Disp: 2 each, Rfl: 0  •  furosemide (LASIX) 20 MG tablet, furosemide 20 mg tablet, Disp: , Rfl:   •  gabapentin (NEURONTIN) 300 MG capsule, Take 300 mg by mouth Daily., Disp: , Rfl:   •  hydroCHLOROthiazide (HYDRODIURIL) 25 MG tablet, Take 25 mg by mouth Daily., Disp: , Rfl:   •  HYDROcodone-acetaminophen (NORCO)  MG per tablet, Take 1 tablet by mouth., Disp: , Rfl:   •  hydrOXYzine (ATARAX) 25 MG tablet, hydroxyzine HCl 25 mg tablet, Disp: , Rfl:   •  meclizine (ANTIVERT) 25 MG tablet, Take 1 tablet by mouth 4 (Four) Times a Day As Needed for Dizziness., Disp: 60 tablet, Rfl: 0  •  mirtazapine (REMERON) 7.5 MG half  tablet, Take 15 mg by mouth Every Night., Disp: , Rfl:   •  ondansetron ODT (ZOFRAN-ODT) 4 MG disintegrating tablet, Place 1 tablet on the tongue 4 (Four) Times a Day As Needed for Nausea., Disp: 30 tablet, Rfl: 0  •  PROBIOTIC PRODUCT PO, Take  by mouth., Disp: , Rfl:   •  senna (SENOKOT) 8.6 MG tablet, Take 1 tablet by mouth Daily., Disp: , Rfl:   •  traZODone (DESYREL) 50 MG tablet, trazodone 50 mg tablet, Disp: , Rfl:   •  Zoster Vac Recomb Adjuvanted (Shingrix) 50 MCG/0.5ML reconstituted suspension, Shingrix (PF) 50 mcg/0.5 mL intramuscular suspension, kit, Disp: , Rfl:      Objective   Physical Exam  Constitutional:       General: She is not in acute distress.     Appearance: Normal appearance. She is obese.   HENT:      Right Ear: Hearing normal.      Left Ear: Hearing normal.      Nose: No nasal tenderness or congestion.      Mouth/Throat:      Mouth: Mucous membranes are moist. No oral lesions.   Eyes:      Extraocular Movements: Extraocular movements intact.      Pupils: Pupils are equal, round, and reactive to light.   Neck:      Thyroid: No thyroid mass or thyromegaly.   Cardiovascular:      Rate and Rhythm: Normal rate and regular rhythm.      Pulses: Normal pulses.      Heart sounds: Normal heart sounds. No murmur heard.  Pulmonary:      Effort: Pulmonary effort is normal.      Breath sounds: Decreased breath sounds present. No wheezing, rhonchi or rales.   Chest:   Breasts:      Right: No axillary adenopathy.       Abdominal:      General: Bowel sounds are normal. There is no distension.      Palpations: Abdomen is soft.      Tenderness: There is no abdominal tenderness.   Musculoskeletal:      Cervical back: Neck supple.      Right lower leg: No edema.      Left lower leg: No edema.   Lymphadenopathy:      Cervical: No cervical adenopathy.      Upper Body:      Right upper body: No axillary adenopathy.   Skin:     General: Skin is warm and dry.      Findings: No lesion or rash.   Neurological:       "General: No focal deficit present.      Mental Status: She is alert and oriented to person, place, and time.      Cranial Nerves: Cranial nerves are intact.   Psychiatric:         Mood and Affect: Affect normal. Mood is not anxious or depressed.         Vital Signs:   /71 (BP Location: Left arm, Patient Position: Sitting, Cuff Size: Adult)   Pulse 59   Temp 98.2 °F (36.8 °C) (Temporal)   Resp 16   Ht 170.2 cm (67\")   Wt 118 kg (261 lb)   SpO2 95% Comment: room air  BMI 40.88 kg/m²        Result Review :     CMP    CMP 5/3/22 6/25/22 6/28/22   Glucose  121 (A)    Glucose 113 (A)  100 (A)   BUN 27 (A) 35 (A) 24 (A)   Creatinine 1.63 (A) 2.13 (A) 1.47 (A)   Sodium 139 139 140   Potassium 4.5 3.2 (A) 4.1   Chloride 98 95 (A) 100   Calcium 10.1 10.2 9.6   Albumin 4.6 4.80 4.5   Total Bilirubin 0.7 0.8 0.5   Alkaline Phosphatase 120 130 (A) 119   AST (SGOT) 27 24 27   ALT (SGPT) 17 15 13   (A) Abnormal value            CBC w/diff    CBC w/Diff 5/3/22 6/2/22 6/25/22   WBC 11.41 (A) 11.63 (A) 13.22 (A)   RBC 4.66 4.80 4.41   Hemoglobin 12.0 12.2 11.4 (A)   Hematocrit 38.6 39.0 34.5   MCV 82.8 81.3 78.2 (A)   MCH 25.8 (A) 25.4 (A) 25.9 (A)   MCHC 31.1 31.3 33.0   RDW 15.0 15.3 16.0 (A)   Platelets 424 432 421   Neutrophil Rel % 64.8 62.0 63.0   Immature Granulocyte Rel % 0.4 0.4 0.3   Lymphocyte Rel % 23.6 26.4 25.5   Monocyte Rel % 9.5 9.3 9.7   Eosinophil Rel % 1.2 1.3 1.0   Basophil Rel % 0.5 0.6 0.5   (A) Abnormal value            Data reviewed: Radiologic studies Chest x-ray 9/1/2022, chest CT 1/28/2021, pulmonary function test 1/11/2021, sleep study 5/27/2022 cardiology studies Echocardiogram 12/31/2019 and My last office note   Procedures        Assessment and Plan    Diagnoses and all orders for this visit:    1. Centrilobular emphysema (HCC) (Primary)  -     Budeson-Glycopyrrol-Formoterol (Breztri Aerosphere) 160-9-4.8 MCG/ACT aerosol inhaler; Inhale 2 puffs 2 (Two) Times a Day.  Dispense: 1 each; " Refill: 11    2. Dyspnea on exertion    3. CRISTINO on CPAP    4. Paroxysmal atrial fibrillation (HCC)    5. Diastolic CHF    6. Essential hypertension    7. Nicotine dependence, cigarettes, in remission    8. Morbid obesity with BMI of 40.0-44.9, adult (HCC)    Other orders  -     Budeson-Glycopyrrol-Formoterol (Breztri Aerosphere) 160-9-4.8 MCG/ACT aerosol inhaler; Inhale 2 puffs 2 (Two) Times a Day for 1 day.  Dispense: 1 each; Refill: 0    9.  Stop Stiolto and start Breztri 2 puffs twice daily as prescribed.  Rinse mouth out after each use.  10.  Continue albuterol as needed.  11.  Encourage patient to try to stay as active as possible.  Recommend 30 minutes of daily activity.  12.  Continue CPAP at current settings.  Follow up with sleep medicine as scheduled.  13.  Follow-up with cardiology as scheduled.  14.  Follow up with our office in 3 months, sooner if needed.        Follow Up   Return in about 3 months (around 12/14/2022) for Recheck with dinorah.  Patient was given instructions and counseling regarding her condition or for health maintenance advice. Please see specific information pulled into the AVS if appropriate.

## 2022-09-14 NOTE — PATIENT INSTRUCTIONS
Sleep Apnea  Sleep apnea is a condition in which breathing pauses or becomes shallow during sleep. Episodes of sleep apnea usually last 10 seconds or longer, and they may occur as many as 20 times an hour. Sleep apnea disrupts your sleep and keeps your body from getting the rest that it needs. This condition can increase your risk of certain health problems, including:  Heart attack.  Stroke.  Obesity.  Diabetes.  Heart failure.  Irregular heartbeat.  What are the causes?  There are three kinds of sleep apnea:  Obstructive sleep apnea. This kind is caused by a blocked or collapsed airway.  Central sleep apnea. This kind happens when the part of the brain that controls breathing does not send the correct signals to the muscles that control breathing.  Mixed sleep apnea. This is a combination of obstructive and central sleep apnea.  The most common cause of this condition is a collapsed or blocked airway. An airway can collapse or become blocked if:  Your throat muscles are abnormally relaxed.  Your tongue and tonsils are larger than normal.  You are overweight.  Your airway is smaller than normal.  What increases the risk?  You are more likely to develop this condition if you:  Are overweight.  Smoke.  Have a smaller than normal airway.  Are elderly.  Are male.  Drink alcohol.  Take sedatives or tranquilizers.  Have a family history of sleep apnea.  What are the signs or symptoms?  Symptoms of this condition include:  Trouble staying asleep.  Daytime sleepiness and tiredness.  Irritability.  Loud snoring.  Morning headaches.  Trouble concentrating.  Forgetfulness.  Decreased interest in sex.  Unexplained sleepiness.  Mood swings.  Personality changes.  Feelings of depression.  Waking up often during the night to urinate.  Dry mouth.  Sore throat.  How is this diagnosed?  This condition may be diagnosed with:  A medical history.  A physical exam.  A series of tests that are done while you are sleeping (sleep study).  These tests are usually done in a sleep lab, but they may also be done at home.  How is this treated?  Treatment for this condition aims to restore normal breathing and to ease symptoms during sleep. It may involve managing health issues that can affect breathing, such as high blood pressure or obesity. Treatment may include:  Sleeping on your side.  Using a decongestant if you have nasal congestion.  Avoiding the use of depressants, including alcohol, sedatives, and narcotics.  Losing weight if you are overweight.  Making changes to your diet.  Quitting smoking.  Using a device to open your airway while you sleep, such as:  An oral appliance. This is a custom-made mouthpiece that shifts your lower jaw forward.  A continuous positive airway pressure (CPAP) device. This device blows air through a mask when you breathe out (exhale).  A nasal expiratory positive airway pressure (EPAP) device. This device has valves that you put into each nostril.  A bi-level positive airway pressure (BPAP) device. This device blows air through a mask when you breathe in (inhale) and breathe out (exhale).  Having surgery if other treatments do not work. During surgery, excess tissue is removed to create a wider airway.  It is important to get treatment for sleep apnea. Without treatment, this condition can lead to:  High blood pressure.  Coronary artery disease.  In men, an inability to achieve or maintain an erection (impotence).  Reduced thinking abilities.  Follow these instructions at home:  Lifestyle  Make any lifestyle changes that your health care provider recommends.  Eat a healthy, well-balanced diet.  Take steps to lose weight if you are overweight.  Avoid using depressants, including alcohol, sedatives, and narcotics.  Do not use any products that contain nicotine or tobacco, such as cigarettes, e-cigarettes, and chewing tobacco. If you need help quitting, ask your health care provider.  General instructions  Take  over-the-counter and prescription medicines only as told by your health care provider.  If you were given a device to open your airway while you sleep, use it only as told by your health care provider.  If you are having surgery, make sure to tell your health care provider you have sleep apnea. You may need to bring your device with you.  Keep all follow-up visits as told by your health care provider. This is important.  Contact a health care provider if:  The device that you received to open your airway during sleep is uncomfortable or does not seem to be working.  Your symptoms do not improve.  Your symptoms get worse.  Get help right away if:  You develop:  Chest pain.  Shortness of breath.  Discomfort in your back, arms, or stomach.  You have:  Trouble speaking.  Weakness on one side of your body.  Drooping in your face.  These symptoms may represent a serious problem that is an emergency. Do not wait to see if the symptoms will go away. Get medical help right away. Call your local emergency services (911 in the [place] Do not drive yourself to the hospital.  Summary  Sleep apnea is a condition in which breathing pauses or becomes shallow during sleep.  The most common cause is a collapsed or blocked airway.  The goal of treatment is to restore normal breathing and to ease symptoms during sleep.  This information is not intended to replace advice given to you by your health care provider. Make sure you discuss any questions you have with your health care provider.  Document Revised: 06/03/2020 Document Reviewed: 08/13/2019  [employer] Patient Education © 2021 [employer]

## 2022-09-19 ENCOUNTER — TELEPHONE (OUTPATIENT)
Dept: PULMONOLOGY | Facility: CLINIC | Age: 72
End: 2022-09-19

## 2022-09-19 DIAGNOSIS — J43.2 CENTRILOBULAR EMPHYSEMA: Primary | ICD-10-CM

## 2022-09-19 RX ORDER — BUDESONIDE, GLYCOPYRROLATE, AND FORMOTEROL FUMARATE 160; 9; 4.8 UG/1; UG/1; UG/1
2 AEROSOL, METERED RESPIRATORY (INHALATION) 2 TIMES DAILY
Qty: 3 EACH | Refills: 3 | Status: SHIPPED | OUTPATIENT
Start: 2022-09-19 | End: 2022-12-14

## 2022-09-19 NOTE — TELEPHONE ENCOUNTER
Patient called stating that the Breztri was working great and was asking if she could have a 3 mo supply.

## 2022-10-17 ENCOUNTER — TELEPHONE (OUTPATIENT)
Dept: SLEEP MEDICINE | Facility: HOSPITAL | Age: 72
End: 2022-10-17

## 2022-10-17 NOTE — TELEPHONE ENCOUNTER
Pt called today bc she is having terrible nose bleeds.  She states she takes Eliquis and it could be making it worse.  Pts machine is set on humification level of 4 but still has a lot of water in the chamber when she wakes.  Took a download to Dr. Hernandez, says pressure looks good and just moderate leakage of mask.  He feels like humidification level needs to be increased.  Pt will try level 6 and see how that does.  If no relief, she will call back.

## 2022-10-20 ENCOUNTER — TELEPHONE (OUTPATIENT)
Dept: SLEEP MEDICINE | Facility: HOSPITAL | Age: 72
End: 2022-10-20

## 2022-10-20 NOTE — TELEPHONE ENCOUNTER
Pt left message this morning re the humidification level on her machine.  It is still showing a 4 even though it was changed to a 6 by modem.  Told pt to call DME and they could walk her through the process of changing the level at home.

## 2022-11-28 ENCOUNTER — TELEPHONE (OUTPATIENT)
Dept: SLEEP MEDICINE | Facility: HOSPITAL | Age: 72
End: 2022-11-28

## 2022-11-28 NOTE — TELEPHONE ENCOUNTER
Patient called stating she returned cpap and wanted to cancel appt, patient will come in to appt and talk with physician about alternatives

## 2022-12-05 ENCOUNTER — OFFICE VISIT (OUTPATIENT)
Dept: SLEEP MEDICINE | Facility: HOSPITAL | Age: 72
End: 2022-12-05

## 2022-12-05 VITALS
HEART RATE: 68 BPM | SYSTOLIC BLOOD PRESSURE: 126 MMHG | DIASTOLIC BLOOD PRESSURE: 76 MMHG | WEIGHT: 261.5 LBS | BODY MASS INDEX: 39.63 KG/M2 | HEIGHT: 68 IN | OXYGEN SATURATION: 95 %

## 2022-12-05 DIAGNOSIS — E66.01 CLASS 3 SEVERE OBESITY DUE TO EXCESS CALORIES WITHOUT SERIOUS COMORBIDITY WITH BODY MASS INDEX (BMI) OF 40.0 TO 44.9 IN ADULT: ICD-10-CM

## 2022-12-05 DIAGNOSIS — I48.0 PAROXYSMAL ATRIAL FIBRILLATION: ICD-10-CM

## 2022-12-05 DIAGNOSIS — I10 ESSENTIAL HYPERTENSION: ICD-10-CM

## 2022-12-05 DIAGNOSIS — G47.30 OBSERVED SLEEP APNEA: Primary | ICD-10-CM

## 2022-12-05 DIAGNOSIS — R06.83 SNORING: ICD-10-CM

## 2022-12-05 PROCEDURE — 99214 OFFICE O/P EST MOD 30 MIN: CPT | Performed by: INTERNAL MEDICINE

## 2022-12-05 PROCEDURE — G0463 HOSPITAL OUTPT CLINIC VISIT: HCPCS

## 2022-12-05 RX ORDER — MIRTAZAPINE 7.5 MG/1
1 TABLET, FILM COATED ORAL
COMMUNITY
Start: 2022-11-07 | End: 2022-12-05 | Stop reason: SDUPTHER

## 2022-12-05 RX ORDER — ATORVASTATIN CALCIUM 80 MG/1
1 TABLET, FILM COATED ORAL DAILY
COMMUNITY
Start: 2022-11-10 | End: 2022-12-05 | Stop reason: SDUPTHER

## 2022-12-05 RX ORDER — GABAPENTIN 300 MG/1
300 CAPSULE ORAL
COMMUNITY
Start: 2022-12-01 | End: 2022-12-05 | Stop reason: SDUPTHER

## 2022-12-14 ENCOUNTER — OFFICE VISIT (OUTPATIENT)
Dept: PULMONOLOGY | Facility: CLINIC | Age: 72
End: 2022-12-14

## 2022-12-14 VITALS
SYSTOLIC BLOOD PRESSURE: 132 MMHG | BODY MASS INDEX: 40.65 KG/M2 | HEART RATE: 76 BPM | TEMPERATURE: 98.2 F | RESPIRATION RATE: 16 BRPM | OXYGEN SATURATION: 94 % | WEIGHT: 259 LBS | DIASTOLIC BLOOD PRESSURE: 71 MMHG | HEIGHT: 67 IN

## 2022-12-14 DIAGNOSIS — I50.33 ACUTE ON CHRONIC DIASTOLIC CHF (CONGESTIVE HEART FAILURE): ICD-10-CM

## 2022-12-14 DIAGNOSIS — R06.09 DYSPNEA ON EXERTION: ICD-10-CM

## 2022-12-14 DIAGNOSIS — F17.211 NICOTINE DEPENDENCE, CIGARETTES, IN REMISSION: ICD-10-CM

## 2022-12-14 DIAGNOSIS — I48.0 PAROXYSMAL ATRIAL FIBRILLATION: ICD-10-CM

## 2022-12-14 DIAGNOSIS — E66.01 MORBID OBESITY WITH BMI OF 40.0-44.9, ADULT: ICD-10-CM

## 2022-12-14 DIAGNOSIS — G47.33 OSA (OBSTRUCTIVE SLEEP APNEA): ICD-10-CM

## 2022-12-14 DIAGNOSIS — J43.2 CENTRILOBULAR EMPHYSEMA: Primary | ICD-10-CM

## 2022-12-14 PROCEDURE — 99214 OFFICE O/P EST MOD 30 MIN: CPT | Performed by: NURSE PRACTITIONER

## 2022-12-14 RX ORDER — DEXTROMETHORPHAN HYDROBROMIDE AND PROMETHAZINE HYDROCHLORIDE 15; 6.25 MG/5ML; MG/5ML
5 SYRUP ORAL
COMMUNITY
Start: 2022-12-14 | End: 2022-12-21

## 2022-12-14 RX ORDER — CEPHALEXIN 500 MG/1
500 CAPSULE ORAL
COMMUNITY
Start: 2022-12-14 | End: 2022-12-20

## 2022-12-14 RX ORDER — TIOTROPIUM BROMIDE AND OLODATEROL 3.124; 2.736 UG/1; UG/1
2 SPRAY, METERED RESPIRATORY (INHALATION)
Qty: 2 EACH | Refills: 0 | COMMUNITY
Start: 2022-12-14 | End: 2022-12-15

## 2022-12-14 RX ORDER — TIOTROPIUM BROMIDE AND OLODATEROL 3.124; 2.736 UG/1; UG/1
2 SPRAY, METERED RESPIRATORY (INHALATION)
Qty: 1 EACH | Refills: 11 | Status: SHIPPED | OUTPATIENT
Start: 2022-12-14 | End: 2023-02-01 | Stop reason: SDUPTHER

## 2022-12-14 NOTE — PROGRESS NOTES
Primary Care Provider  Wilder Morales MD     Referring Provider  No ref. provider found     Chief Complaint  Shortness of Breath, Cough (Clear/light yellow phlegm ), and Follow-up (3 mo f/up )    Subjective          Nica Traylor presents to [facility]MON[facility]INE  History of Present Illness  Nica Traylor is a 72 y.o. female patient of Dr. Reynolds here for management of centrilobular emphysema, tobacco abuse of cigarettes in remission, hypertension, atrial fibrillation, obstructive sleep apnea under the care of sleep medicine, congestive heart failure and dyspnea on exertion.     Patient states she is doing okay since last visit.    She is currently on Keflex per her primary care for an upper respiratory infection. She denies any fevers or chills.  Her shortness of breath is mild in severity, worse with exertion and improved with rest.  She continues to use Breztri as prescribed.  She will intermittently use her albuterol 1-2 times a week if needed.  She does complain of mouth soreness and would like to return to [facility].  Patient did recently see sleep medicine and states that she is unable to wear her CPAP mask.  She has been referred to a another provider for an oral mandibular advancement device.  Otherwise, patient has no additional concerns at this time.  She is able to perform her ADLs without difficulty.  She is up-to-date with her COVID, flu and pneumonia vaccines.     Her history of smoking is   Tobacco Use: Medium Risk   • Smoking Tobacco Use: Former   • Smokeless Tobacco Use: Never   • Passive Exposure: Not on file   .    Review of Systems   Constitutional: Negative for chills, fatigue, fever, unexpected weight gain and unexpected weight loss.   HENT: Negative for congestion (Nasal), hearing loss, mouth sores, nosebleeds, postnasal drip, sore throat and trouble swallowing.    Eyes: Negative for blurred vision and visual disturbance.   Respiratory:  Positive for shortness of breath. Negative for apnea, cough and wheezing.         Negative for Hemoptysis     Cardiovascular: Negative for chest pain, palpitations and leg swelling.   Gastrointestinal: Negative for abdominal pain, constipation, diarrhea, nausea, vomiting and GERD.        Negative for Jaundice  Negative for Bloating  Negative for Melena   Musculoskeletal: Negative for joint swelling and myalgias.        Negative for Joint pain  Negative for Joint stiffness   Skin: Negative for color change.        Negative for cyanosis   Neurological: Negative for syncope, weakness, numbness and headache.      Sleep: Negative for Excessive daytime sleepiness  Negative for morning headaches  Negative for Snoring    Family History   Problem Relation Age of Onset   • Stroke Mother    • Heart disease Mother    • Diabetes Mother    • Pancreatic cancer Mother    • Heart disease Father    • Heart disease Sister    • Cervical cancer Sister    • Uterine cancer Sister    • Heart disease Brother    • Stroke Other         Maternal grandparent/Paternal grandparent   • Heart disease Other         Maternal grandparent/Paternal grandparent   • Stroke Maternal Aunt    • Heart disease Maternal Aunt         Social History     Socioeconomic History   • Marital status:    Tobacco Use   • Smoking status: Former     Packs/day: 0.50     Years: 25.00     Pack years: 12.50     Types: Cigarettes     Quit date:      Years since quittin.9   • Smokeless tobacco: Never   Vaping Use   • Vaping Use: Never used   Substance and Sexual Activity   • Alcohol use: Never     Comment: Does not drink   • Drug use: Never   • Sexual activity: Defer        Past Medical History:   Diagnosis Date   • Asthma    • Burning mouth syndrome    • COPD (chronic obstructive pulmonary disease)    • Depression    • Diastolic CHF 2021   • Essential hypertension 2012   • GERD (gastroesophageal reflux disease)    • Hyperlipidemia    • Oral lesion    •  Paroxysmal atrial fibrillation 05/24/2021        Immunization History   Administered Date(s) Administered   • COVID-19 (MODERNA) 1st, 2nd, 3rd Dose Only 03/02/2021, 04/02/2021, 05/02/2021, 12/09/2021   • Flu Vaccine Quad PF >36MO 09/25/2014   • Flu Vaccine Split Quad 11/22/2005, 10/29/2008, 09/10/2010, 11/10/2011, 09/25/2014   • FluLaval/Fluzone >6mos 09/25/2014   • Fluad Quad 65+ 09/14/2020   • Fluzone High Dose =>65 Years ([employer] ONLY) 12/01/2015, 10/24/2016, 09/26/2017, 09/28/2018, 10/11/2019   • Fluzone High-Dose 65+yrs 10/09/2021, 09/27/2022   • Influenza TIV (IM) 11/22/2005, 10/29/2008, 09/10/2010, 11/10/2011   • Pneumococcal Conjugate 13-Valent (PCV13) 12/01/2015   • Pneumococcal Polysaccharide (PPSV23) 01/26/2017   • Shingrix 07/07/2020, 09/23/2020   • Tdap 07/15/2013         Allergies   Allergen Reactions   • Propafenone Other (See Comments)     Drops B/p   • Erythromycin Other (See Comments)     Stroke like   • Farxiga [Dapagliflozin] Other (See Comments)     Patient states she got a yeast infection   • Mercury Unknown - Low Severity   • Metoclopramide Other (See Comments)     Decreased blood pressure     • Moxifloxacin Hcl Unknown - Low Severity   • Penicillins Unknown - Low Severity          Current Outpatient Medications:   •  acetaminophen (TYLENOL) 500 MG tablet, Take 500 mg by mouth., Disp: , Rfl:   •  albuterol sulfate  (90 Base) MCG/ACT inhaler, Inhale 2 puffs Every 4 (Four) Hours As Needed for Wheezing., Disp: 54 g, Rfl: 3  •  atorvastatin (LIPITOR) 80 MG tablet, Take 80 mg by mouth Daily., Disp: , Rfl:   •  baclofen (LIORESAL) 10 MG tablet, Take 10 mg by mouth 3 (Three) Times a Day., Disp: , Rfl:   •  cephalexin (KEFLEX) 500 MG capsule, Take 500 mg by mouth., Disp: , Rfl:   •  Cholecalciferol (VITAMIN D3 PO), Take 2,000 Units by mouth Daily., Disp: , Rfl:   •  citalopram (CeleXA) 20 MG tablet, Take 20 mg by mouth Daily., Disp: , Rfl:   •  diphenhydrAMINE (BENADRYL) 25 MG tablet, Take 25  mg by mouth Every 6 (Six) Hours As Needed., Disp: , Rfl:   •  Eliquis 5 MG tablet tablet, TAKE 1 TABLET BY MOUTH TWICE A DAY, Disp: 180 tablet, Rfl: 3  •  estradiol (ESTRACE) 0.1 MG/GM vaginal cream, estradiol 0.01% (0.1 mg/gram) vaginal cream, Disp: , Rfl:   •  famotidine (PEPCID) 20 MG tablet, Take 20 mg by mouth every night at bedtime., Disp: , Rfl:   •  Farxiga 10 MG tablet, Take 1 tablet by mouth Daily., Disp: , Rfl:   •  flecainide (TAMBOCOR) 50 MG tablet, Take 50 mg by mouth 2 (Two) Times a Day., Disp: , Rfl:   •  furosemide (LASIX) 20 MG tablet, furosemide 20 mg tablet, Disp: , Rfl:   •  furosemide (LASIX) 40 MG tablet, Take 20 mg by mouth., Disp: , Rfl:   •  gabapentin (NEURONTIN) 300 MG capsule, Take 300 mg by mouth Daily., Disp: , Rfl:   •  hydroCHLOROthiazide (HYDRODIURIL) 25 MG tablet, Take 25 mg by mouth Daily., Disp: , Rfl:   •  HYDROcodone-acetaminophen (NORCO)  MG per tablet, hydrocodone 10 mg-acetaminophen 325 mg tablet, Disp: , Rfl:   •  hydrOXYzine (ATARAX) 25 MG tablet, hydroxyzine HCl 25 mg tablet, Disp: , Rfl:   •  meclizine (ANTIVERT) 25 MG tablet, Take 1 tablet by mouth 4 (Four) Times a Day As Needed for Dizziness., Disp: 60 tablet, Rfl: 0  •  metoprolol succinate XL (TOPROL-XL) 25 MG 24 hr tablet, TAKE 1/2 TABLET BY MOUTH EVERY DAY, Disp: 45 tablet, Rfl: 3  •  mirtazapine (REMERON) 7.5 MG half tablet, Take 15 mg by mouth Every Night., Disp: , Rfl:   •  multivitamin (THERAGRAN) tablet tablet, Take  by mouth., Disp: , Rfl:   •  ondansetron ODT (ZOFRAN-ODT) 4 MG disintegrating tablet, Place 1 tablet on the tongue 4 (Four) Times a Day As Needed for Nausea., Disp: 30 tablet, Rfl: 0  •  pantoprazole (PROTONIX) 40 MG EC tablet, Take 1 tablet by mouth Daily., Disp: 90 tablet, Rfl: 2  •  polyethylene glycol (MIRALAX) 17 g packet, Take 17 g by mouth Daily., Disp: , Rfl:   •  potassium chloride (K-DUR,KLOR-CON) 20 MEQ CR tablet, potassium chloride ER 20 mEq tablet,extended release, Disp: , Rfl:    •  PROBIOTIC PRODUCT PO, Take  by mouth., Disp: , Rfl:   •  prochlorperazine (COMPAZINE) 5 MG tablet, prochlorperazine maleate 5 mg oral tablet take 1 tablet (5 mg) by oral route 4 times per day   Active, Disp: , Rfl:   •  promethazine (PHENERGAN) 25 MG tablet, promethazine 25 mg oral tablet take 1 tablet (25 mg) by oral route every 6 hours as needed   Active, Disp: , Rfl:   •  promethazine-dextromethorphan (PROMETHAZINE-DM) 6.25-15 MG/5ML syrup, Take 5 mL by mouth., Disp: , Rfl:   •  senna (SENOKOT) 8.6 MG tablet, Take 1 tablet by mouth Daily., Disp: , Rfl:   •  spironolactone-hydrochlorothiazide (ALDACTAZIDE) 25-25 MG tablet, spironolactone 25 mg-hydrochlorothiazide 25 mg tablet, Disp: , Rfl:   •  traZODone (DESYREL) 50 MG tablet, trazodone 50 mg tablet, Disp: , Rfl:   •  Zoster Vac Recomb Adjuvanted (Shingrix) 50 MCG/0.5ML reconstituted suspension, Shingrix (PF) 50 mcg/0.5 mL intramuscular suspension, kit, Disp: , Rfl:   •  tiotropium bromide-olodaterol (Stiolto Respimat) 2.5-2.5 MCG/ACT aerosol solution inhaler, Inhale 2 puffs Daily., Disp: 1 each, Rfl: 11  •  tiotropium bromide-olodaterol (Stiolto Respimat) 2.5-2.5 MCG/ACT aerosol solution inhaler, Inhale 2 puffs Daily for 1 day., Disp: 2 each, Rfl: 0     Objective   Physical Exam  Constitutional:       General: She is not in acute distress.     Appearance: Normal appearance. She is obese.   HENT:      Right Ear: Hearing normal.      Left Ear: Hearing normal.      Nose: No nasal tenderness or congestion.      Mouth/Throat:      Mouth: Mucous membranes are moist. No oral lesions.   Eyes:      Extraocular Movements: Extraocular movements intact.      Pupils: Pupils are equal, round, and reactive to light.   Neck:      Thyroid: No thyroid mass or thyromegaly.   Cardiovascular:      Rate and Rhythm: Normal rate and regular rhythm.      Pulses: Normal pulses.      Heart sounds: Normal heart sounds. No murmur heard.  Pulmonary:      Effort: Pulmonary effort is  "normal.      Breath sounds: Normal breath sounds. No wheezing, rhonchi or rales.   Abdominal:      General: Bowel sounds are normal. There is no distension.      Palpations: Abdomen is soft.      Tenderness: There is no abdominal tenderness.   Musculoskeletal:      Cervical back: Neck supple.      Right lower leg: No edema.      Left lower leg: No edema.   Lymphadenopathy:      Cervical: No cervical adenopathy.      Upper Body:      Right upper body: No axillary adenopathy.   Skin:     General: Skin is warm and dry.      Findings: No lesion or rash.   Neurological:      General: No focal deficit present.      Mental Status: She is alert and oriented to person, place, and time.      Cranial Nerves: Cranial nerves are intact.   Psychiatric:         Mood and Affect: Affect normal. Mood is not anxious or depressed.         Vital Signs:   /71 (BP Location: Left arm, Patient Position: Sitting, Cuff Size: Large Adult)   Pulse 76   Temp 98.2 °F (36.8 °C) (Temporal)   Resp 16   Ht 170.2 cm (67\")   Wt 117 kg (259 lb)   SpO2 94% Comment: room air  BMI 40.57 kg/m²        Result Review :     CMP    CMP 5/3/22 6/25/22 6/28/22   Glucose  121 (A)    Glucose 113 (A)  100 (A)   BUN 27 (A) 35 (A) 24 (A)   Creatinine 1.63 (A) 2.13 (A) 1.47 (A)   Sodium 139 139 140   Potassium 4.5 3.2 (A) 4.1   Chloride 98 95 (A) 100   Calcium 10.1 10.2 9.6   Albumin 4.6 4.80 4.5   Total Bilirubin 0.7 0.8 0.5   Alkaline Phosphatase 120 130 (A) 119   AST (SGOT) 27 24 27   ALT (SGPT) 17 15 13   (A) Abnormal value            CBC w/diff    CBC w/Diff 5/3/22 6/2/22 6/25/22   WBC 11.41 (A) 11.63 (A) 13.22 (A)   RBC 4.66 4.80 4.41   Hemoglobin 12.0 12.2 11.4 (A)   Hematocrit 38.6 39.0 34.5   MCV 82.8 81.3 78.2 (A)   MCH 25.8 (A) 25.4 (A) 25.9 (A)   MCHC 31.1 31.3 33.0   RDW 15.0 15.3 16.0 (A)   Platelets 424 432 421   Neutrophil Rel % 64.8 62.0 63.0   Immature Granulocyte Rel % 0.4 0.4 0.3   Lymphocyte Rel % 23.6 26.4 25.5   Monocyte Rel % 9.5 9.3 " 9.7   Eosinophil Rel % 1.2 1.3 1.0   Basophil Rel % 0.5 0.6 0.5   (A) Abnormal value            Data reviewed: Radiologic studies Chest CT 1/28/2021, chest x-ray 9/1/2022, pulmonary function test 1/11/2021, echocardiogram 12/31/2019 showing grade 1 diastolic dysfunction and My last office note   Procedures        Assessment and Plan    Diagnoses and all orders for this visit:    1. Centrilobular emphysema (HCC) (Primary)  -     tiotropium bromide-olodaterol (Stiolto Respimat) 2.5-2.5 MCG/ACT aerosol solution inhaler; Inhale 2 puffs Daily.  Dispense: 1 each; Refill: 11  -     tiotropium bromide-olodaterol (Stiolto Respimat) 2.5-2.5 MCG/ACT aerosol solution inhaler; Inhale 2 puffs Daily for 1 day.  Dispense: 2 each; Refill: 0    2. Paroxysmal atrial fibrillation (HCC)    3. Diastolic CHF    4. CRISTINO (obstructive sleep apnea)    5. Dyspnea on exertion    6. Nicotine dependence, cigarettes, in remission    7. Morbid obesity with BMI of 40.0-44.9, adult (HCC)    8.  Stop Breztri.  Restart Stiolto as prescribed.   9.  Continue albuterol as needed.  10.  Encourage patient to try to stay as active as possible.  Recommend 30 minutes of daily activity.  11.  Follow-up with sleep medicine as scheduled.  12.  Follow-up with cardiology as scheduled.  13.  Follow up with our office in 3-4 months, sooner if needed.        Follow Up   Return in about 3 months (around 3/14/2023) for Recheck 3-4 months with Catherine.  Patient was given instructions and counseling regarding her condition or for health maintenance advice. Please see specific information pulled into the AVS if appropriate.

## 2022-12-14 NOTE — PATIENT INSTRUCTIONS
Sleep Apnea  Sleep apnea is a condition in which breathing pauses or becomes shallow during sleep. People with sleep apnea usually snore loudly. They may have times when they gasp and stop breathing for 10 seconds or more during sleep. This may happen many times during the night.  Sleep apnea disrupts your sleep and keeps your body from getting the rest that it needs. This condition can increase your risk of certain health problems, including:  Heart attack.  Stroke.  Obesity.  Type 2 diabetes.  Heart failure.  Irregular heartbeat.  High blood pressure.  The goal of treatment is to help you breathe normally again.  What are the causes?  The most common cause of sleep apnea is a collapsed or blocked airway.  There are three kinds of sleep apnea:  Obstructive sleep apnea. This kind is caused by a blocked or collapsed airway.  Central sleep apnea. This kind happens when the part of the brain that controls breathing does not send the correct signals to the muscles that control breathing.  Mixed sleep apnea. This is a combination of obstructive and central sleep apnea.  What increases the risk?  You are more likely to develop this condition if you:  Are overweight.  Smoke.  Have a smaller than normal airway.  Are older.  Are male.  Drink alcohol.  Take sedatives or tranquilizers.  Have a family history of sleep apnea.  Have a tongue or tonsils that are larger than normal.  What are the signs or symptoms?  Symptoms of this condition include:  Trouble staying asleep.  Loud snoring.  Morning headaches.  Waking up gasping.  Dry mouth or sore throat in the morning.  Daytime sleepiness and tiredness.  If you have daytime fatigue because of sleep apnea, you may be more likely to have:  Trouble concentrating.  Forgetfulness.  Irritability or mood swings.  Personality changes.  Feelings of depression.  Sexual dysfunction. This may include loss of interest if you are female, or erectile dysfunction if you are male.  How is this  diagnosed?  This condition may be diagnosed with:  A medical history.  A physical exam.  A series of tests that are done while you are sleeping (sleep study). These tests are usually done in a sleep lab, but they may also be done at home.  How is this treated?  Treatment for this condition aims to restore normal breathing and to ease symptoms during sleep. It may involve managing health issues that can affect breathing, such as high blood pressure or obesity. Treatment may include:  Sleeping on your side.  Using a decongestant if you have nasal congestion.  Avoiding the use of depressants, including alcohol, sedatives, and narcotics.  Losing weight if you are overweight.  Making changes to your diet.  Quitting smoking.  Using a device to open your airway while you sleep, such as:  An oral appliance. This is a custom-made mouthpiece that shifts your lower jaw forward.  A continuous positive airway pressure (CPAP) device. This device blows air through a mask when you breathe out (exhale).  A nasal expiratory positive airway pressure (EPAP) device. This device has valves that you put into each nostril.  A bi-level positive airway pressure (BIPAP) device. This device blows air through a mask when you breathe in (inhale) and breathe out (exhale).  Having surgery if other treatments do not work. During surgery, excess tissue is removed to create a wider airway.  Follow these instructions at home:  Lifestyle  Make any lifestyle changes that your health care provider recommends.  Eat a healthy, well-balanced diet.  Take steps to lose weight if you are overweight.  Avoid using depressants, including alcohol, sedatives, and narcotics.  Do not use any products that contain nicotine or tobacco. These products include cigarettes, chewing tobacco, and vaping devices, such as e-cigarettes. If you need help quitting, ask your health care provider.  General instructions  Take over-the-counter and prescription medicines only as told  by your health care provider.  If you were given a device to open your airway while you sleep, use it only as told by your health care provider.  If you are having surgery, make sure to tell your health care provider you have sleep apnea. You may need to bring your device with you.  Keep all follow-up visits. This is important.  Contact a health care provider if:  The device that you received to open your airway during sleep is uncomfortable or does not seem to be working.  Your symptoms do not improve.  Your symptoms get worse.  Get help right away if:  You develop:  Chest pain.  Shortness of breath.  Discomfort in your back, arms, or stomach.  You have:  Trouble speaking.  Weakness on one side of your body.  Drooping in your face.  These symptoms may represent a serious problem that is an emergency. Do not wait to see if the symptoms will go away. Get medical help right away. Call your local emergency services (911 in the [place] Do not drive yourself to the hospital.  Summary  Sleep apnea is a condition in which breathing pauses or becomes shallow during sleep.  The most common cause is a collapsed or blocked airway.  The goal of treatment is to restore normal breathing and to ease symptoms during sleep.  This information is not intended to replace advice given to you by your health care provider. Make sure you discuss any questions you have with your health care provider.  Document Revised: 07/27/2022 Document Reviewed: 11/26/2021  [employer] Patient Education © 2022 [employer]  Obesity, Adult  Obesity is the condition of having too much total body fat. Being overweight or obese means that your weight is greater than what is considered healthy for your body size. Obesity is determined by a measurement called BMI (body mass index). BMI is an estimate of body fat and is calculated from height and weight. For adults, a BMI of 30 or higher is considered obese.  Obesity can lead to other health concerns and major  illnesses, including:  Stroke.  Coronary artery disease (CAD).  Type 2 diabetes.  Some types of cancer, including cancers of the colon, breast, uterus, and gallbladder.  High blood pressure (hypertension).  High cholesterol.  Gallbladder stones.  Obesity can also contribute to:  Osteoarthritis.  Sleep apnea.  Infertility problems.  What are the causes?  Common causes of this condition include:  Eating daily meals that are high in calories, sugar, and fat.  Drinking high amounts of sugar-sweetened beverages, such as soft drinks.  Being born with genes that may make you more likely to become obese.  Having a medical condition that causes obesity, including:  Hypothyroidism.  Polycystic ovarian syndrome (PCOS).  Binge-eating disorder.  Cushing syndrome.  Taking certain medicines, such as steroids, antidepressants, and seizure medicines.  Not being physically active (sedentary lifestyle).  Not getting enough sleep.  What increases the risk?  The following factors may make you more likely to develop this condition:  Having a family history of obesity.  Living in an area with limited access to:  [place], recreation centers, or sidewalks.  Healthy food choices, such as grocery stores and farmers' markets.  What are the signs or symptoms?  The main sign of this condition is having too much body fat.  How is this diagnosed?  This condition is diagnosed based on:  Your BMI. If you are an adult with a BMI of 30 or higher, you are considered obese.  Your waist circumference. This measures the distance around your waistline.  Your skinfold thickness. Your health care provider may gently pinch a fold of your skin and measure it.  You may have other tests to check for underlying conditions.  How is this treated?  Treatment for this condition often includes changing your lifestyle. Treatment may include some or all of the following:  Dietary changes. This may include developing a healthy meal plan.  Regular physical activity. This  may include activity that causes your heart to beat faster (aerobic exercise) and strength training. Work with your health care provider to design an exercise program that works for you.  Medicine to help you lose weight if you are unable to lose one pound a week after six weeks of healthy eating and more physical activity.  Treating conditions that cause the obesity (underlying conditions).  Surgery. Surgical options may include gastric banding and gastric bypass. Surgery may be done if:  Other treatments have not helped to improve your condition.  You have a BMI of 40 or higher.  You have life-threatening health problems related to obesity.  Follow these instructions at home:  Eating and drinking    Follow recommendations from your health care provider about what you eat and drink. Your health care provider may advise you to:  Limit fast food, sweets, and processed snack foods.  Choose low-fat options, such as low-fat milk instead of whole milk.  Eat five or more servings of fruits or vegetables every day.  Choose healthy foods when you eat out.  Keep low-fat snacks available.  Limit sugary drinks, such as soda, fruit juice, sweetened iced tea, and flavored milk.  Drink enough water to keep your urine pale yellow.  Do not follow a fad diet. Fad diets can be unhealthy and even dangerous.  Other healthful choices include:  Eat at home more often. This gives you more control over what you eat.  Learn to read food labels. This will help you understand how much food is considered one serving.  Learn what a healthy serving size is.  Physical activity  Exercise regularly, as told by your health care provider.  Most adults should get up to 150 minutes of moderate-intensity exercise every week.  Ask your health care provider what types of exercise are safe for you and how often you should exercise.  Warm up and stretch before being active.  Cool down and stretch after being active.  Rest between periods of  activity.  Lifestyle  Work with your health care provider and a dietitian to set a weight-loss goal that is healthy and reasonable for you.  Limit your screen time.  Find ways to reward yourself that do not involve food.  Do not drink alcohol if:  Your health care provider tells you not to drink.  You are pregnant, may be pregnant, or are planning to become pregnant.  If you drink alcohol:  Limit how much you have to:  0-1 drink a day for women.  0-2 drinks a day for men.  Know how much alcohol is in your drink. In the [place] one drink equals one 12 oz bottle of beer (355 mL), one 5 oz glass of wine (148 mL), or one 1½ oz glass of hard liquor (44 mL).  General instructions  Keep a weight-loss journal to keep track of the food you eat and how much exercise you get.  Take over-the-counter and prescription medicines only as told by your health care provider.  Take vitamins and supplements only as told by your health care provider.  Consider joining a support group. Your health care provider may be able to recommend a support group.  Pay attention to your mental health as obesity can lead to depression or self esteem issues.  Keep all follow-up visits. This is important.  Contact a health care provider if:  You are unable to meet your weight-loss goal after six weeks of dietary and lifestyle changes.  You have trouble breathing.  Summary  Obesity is the condition of having too much total body fat.  Being overweight or obese means that your weight is greater than what is considered healthy for your body size.  Work with your health care provider and a dietitian to set a weight-loss goal that is healthy and reasonable for you.  Exercise regularly, as told by your health care provider. Ask your health care provider what types of exercise are safe for you and how often you should exercise.  This information is not intended to replace advice given to you by your health care provider. Make sure you discuss any questions you  have with your health care provider.  Document Revised: 07/26/2022 Document Reviewed: 07/26/2022  [employer] Patient Education © 2022 [employer]

## 2022-12-28 RX ORDER — APIXABAN 5 MG/1
TABLET, FILM COATED ORAL
Qty: 180 TABLET | Refills: 3 | OUTPATIENT
Start: 2022-12-28

## 2023-01-06 ENCOUNTER — TELEPHONE (OUTPATIENT)
Dept: SLEEP MEDICINE | Facility: HOSPITAL | Age: 73
End: 2023-01-06
Payer: MEDICARE

## 2023-01-06 NOTE — TELEPHONE ENCOUNTER
Spoke to the pt about going to Dr. Rondon but after calling his office, he does not take her insurance.  Call today to [facility] At [phone], left message for them to call re: dental mouth pieces and also what insurances they accept. Waiting for return call now.

## 2023-01-24 ENCOUNTER — TELEPHONE (OUTPATIENT)
Dept: SLEEP MEDICINE | Facility: HOSPITAL | Age: 73
End: 2023-01-24
Payer: MEDICARE

## 2023-01-24 NOTE — TELEPHONE ENCOUNTER
Call to pt to let her know that I finally talked to [facility]. Unfortunately they do not do mouthpieces for sleep apnea.  Pt has decided to try to lose some weight and be retested for sleep apnea.  If she changes her mind and decides to go to [place] to the dentist, she will call and let us know so we can send a referral.

## 2023-02-01 DIAGNOSIS — J43.2 CENTRILOBULAR EMPHYSEMA: ICD-10-CM

## 2023-02-01 RX ORDER — TIOTROPIUM BROMIDE AND OLODATEROL 3.124; 2.736 UG/1; UG/1
2 SPRAY, METERED RESPIRATORY (INHALATION)
Qty: 3 EACH | Refills: 3 | Status: SHIPPED | OUTPATIENT
Start: 2023-02-01

## 2023-02-17 ENCOUNTER — HOSPITAL ENCOUNTER (EMERGENCY)
Facility: HOSPITAL | Age: 73
Discharge: HOME | End: 2023-02-17
Attending: EMERGENCY MEDICINE | Admitting: EMERGENCY MEDICINE
Payer: MEDICARE

## 2023-02-17 ENCOUNTER — APPOINTMENT (OUTPATIENT)
Dept: GENERAL RADIOLOGY | Facility: HOSPITAL | Age: 73
End: 2023-02-17
Payer: MEDICARE

## 2023-02-17 VITALS
RESPIRATION RATE: 12 BRPM | DIASTOLIC BLOOD PRESSURE: 83 MMHG | OXYGEN SATURATION: 92 % | TEMPERATURE: 98.7 F | WEIGHT: 261.91 LBS | SYSTOLIC BLOOD PRESSURE: 143 MMHG | HEART RATE: 60 BPM | BODY MASS INDEX: 39.69 KG/M2 | HEIGHT: 68 IN

## 2023-02-17 DIAGNOSIS — R42 VERTIGO: Primary | ICD-10-CM

## 2023-02-17 DIAGNOSIS — R07.9 CHEST PAIN, UNSPECIFIED TYPE: ICD-10-CM

## 2023-02-17 LAB
ALBUMIN SERPL-MCNC: 4.9 G/DL (ref 3.5–5.2)
ALBUMIN/GLOB SERPL: 1.7 G/DL
ALP SERPL-CCNC: 100 U/L (ref 39–117)
ALT SERPL W P-5'-P-CCNC: 18 U/L (ref 1–33)
ANION GAP SERPL CALCULATED.3IONS-SCNC: 14.4 MMOL/L (ref 5–15)
AST SERPL-CCNC: 21 U/L (ref 1–32)
BASOPHILS # BLD AUTO: 0.06 10*3/MM3 (ref 0–0.2)
BASOPHILS NFR BLD AUTO: 0.5 % (ref 0–1.5)
BILIRUB SERPL-MCNC: 0.7 MG/DL (ref 0–1.2)
BUN SERPL-MCNC: 24 MG/DL (ref 8–23)
BUN/CREAT SERPL: 16 (ref 7–25)
CALCIUM SPEC-SCNC: 9.9 MG/DL (ref 8.6–10.5)
CHLORIDE SERPL-SCNC: 96 MMOL/L (ref 98–107)
CO2 SERPL-SCNC: 27.6 MMOL/L (ref 22–29)
CREAT SERPL-MCNC: 1.5 MG/DL (ref 0.57–1)
DEPRECATED RDW RBC AUTO: 46.5 FL (ref 37–54)
EGFRCR SERPLBLD CKD-EPI 2021: 36.9 ML/MIN/1.73
EOSINOPHIL # BLD AUTO: 0.11 10*3/MM3 (ref 0–0.4)
EOSINOPHIL NFR BLD AUTO: 0.9 % (ref 0.3–6.2)
ERYTHROCYTE [DISTWIDTH] IN BLOOD BY AUTOMATED COUNT: 16.3 % (ref 12.3–15.4)
GEN 5 2HR TROPONIN T REFLEX: 10 NG/L
GLOBULIN UR ELPH-MCNC: 2.9 GM/DL
GLUCOSE SERPL-MCNC: 110 MG/DL (ref 65–99)
HCT VFR BLD AUTO: 38.9 % (ref 34–46.6)
HGB BLD-MCNC: 12.9 G/DL (ref 12–15.9)
HOLD SPECIMEN: NORMAL
HOLD SPECIMEN: NORMAL
IMM GRANULOCYTES # BLD AUTO: 0.06 10*3/MM3 (ref 0–0.05)
IMM GRANULOCYTES NFR BLD AUTO: 0.5 % (ref 0–0.5)
LIPASE SERPL-CCNC: 45 U/L (ref 13–60)
LYMPHOCYTES # BLD AUTO: 2.77 10*3/MM3 (ref 0.7–3.1)
LYMPHOCYTES NFR BLD AUTO: 22 % (ref 19.6–45.3)
MAGNESIUM SERPL-MCNC: 1.6 MG/DL (ref 1.6–2.4)
MCH RBC QN AUTO: 26.4 PG (ref 26.6–33)
MCHC RBC AUTO-ENTMCNC: 33.2 G/DL (ref 31.5–35.7)
MCV RBC AUTO: 79.6 FL (ref 79–97)
MONOCYTES # BLD AUTO: 1.07 10*3/MM3 (ref 0.1–0.9)
MONOCYTES NFR BLD AUTO: 8.5 % (ref 5–12)
NEUTROPHILS NFR BLD AUTO: 67.6 % (ref 42.7–76)
NEUTROPHILS NFR BLD AUTO: 8.5 10*3/MM3 (ref 1.7–7)
NRBC BLD AUTO-RTO: 0 /100 WBC (ref 0–0.2)
NT-PROBNP SERPL-MCNC: 58.4 PG/ML (ref 0–900)
PLATELET # BLD AUTO: 416 10*3/MM3 (ref 140–450)
PMV BLD AUTO: 9.8 FL (ref 6–12)
POTASSIUM SERPL-SCNC: 3.8 MMOL/L (ref 3.5–5.2)
PROT SERPL-MCNC: 7.8 G/DL (ref 6–8.5)
RBC # BLD AUTO: 4.89 10*6/MM3 (ref 3.77–5.28)
SODIUM SERPL-SCNC: 138 MMOL/L (ref 136–145)
TROPONIN T DELTA: -1 NG/L
TROPONIN T SERPL HS-MCNC: 11 NG/L
WBC NRBC COR # BLD: 12.57 10*3/MM3 (ref 3.4–10.8)
WHOLE BLOOD HOLD COAG: NORMAL
WHOLE BLOOD HOLD SPECIMEN: NORMAL

## 2023-02-17 PROCEDURE — 84484 ASSAY OF TROPONIN QUANT: CPT | Performed by: EMERGENCY MEDICINE

## 2023-02-17 PROCEDURE — 36415 COLL VENOUS BLD VENIPUNCTURE: CPT

## 2023-02-17 PROCEDURE — 80053 COMPREHEN METABOLIC PANEL: CPT

## 2023-02-17 PROCEDURE — 85025 COMPLETE CBC W/AUTO DIFF WBC: CPT

## 2023-02-17 PROCEDURE — 93010 ELECTROCARDIOGRAM REPORT: CPT | Performed by: SPECIALIST

## 2023-02-17 PROCEDURE — 83735 ASSAY OF MAGNESIUM: CPT

## 2023-02-17 PROCEDURE — 71045 X-RAY EXAM CHEST 1 VIEW: CPT

## 2023-02-17 PROCEDURE — 99284 EMERGENCY DEPT VISIT MOD MDM: CPT

## 2023-02-17 PROCEDURE — 84484 ASSAY OF TROPONIN QUANT: CPT

## 2023-02-17 PROCEDURE — 83690 ASSAY OF LIPASE: CPT

## 2023-02-17 PROCEDURE — 93005 ELECTROCARDIOGRAM TRACING: CPT | Performed by: EMERGENCY MEDICINE

## 2023-02-17 PROCEDURE — 93005 ELECTROCARDIOGRAM TRACING: CPT

## 2023-02-17 PROCEDURE — 83880 ASSAY OF NATRIURETIC PEPTIDE: CPT

## 2023-02-17 PROCEDURE — 96374 THER/PROPH/DIAG INJ IV PUSH: CPT

## 2023-02-17 PROCEDURE — 25010000002 ONDANSETRON PER 1 MG: Performed by: EMERGENCY MEDICINE

## 2023-02-17 RX ORDER — ONDANSETRON 2 MG/ML
4 INJECTION INTRAMUSCULAR; INTRAVENOUS ONCE
Status: COMPLETED | OUTPATIENT
Start: 2023-02-17 | End: 2023-02-17

## 2023-02-17 RX ORDER — ASPIRIN 81 MG/1
324 TABLET, CHEWABLE ORAL ONCE
Status: COMPLETED | OUTPATIENT
Start: 2023-02-17 | End: 2023-02-17

## 2023-02-17 RX ORDER — DIAZEPAM 2 MG/1
2 TABLET ORAL EVERY 6 HOURS PRN
Qty: 10 TABLET | Refills: 0 | Status: SHIPPED | OUTPATIENT
Start: 2023-02-17

## 2023-02-17 RX ORDER — SODIUM CHLORIDE 0.9 % (FLUSH) 0.9 %
10 SYRINGE (ML) INJECTION AS NEEDED
Status: DISCONTINUED | OUTPATIENT
Start: 2023-02-17 | End: 2023-02-18 | Stop reason: HOSPADM

## 2023-02-17 RX ADMIN — ONDANSETRON 4 MG: 2 INJECTION INTRAMUSCULAR; INTRAVENOUS at 20:26

## 2023-02-17 RX ADMIN — ASPIRIN 81 MG 324 MG: 81 TABLET ORAL at 15:37

## 2023-02-17 NOTE — ED PROVIDER NOTES
"Time: 4:38 PM EST  Date of encounter:  2/17/2023  Independent Historian/Clinical History and Information was obtained by:   Patient  Chief Complaint   Patient presents with   • Chest Pain       History is limited by: N/A    History of Present Illness:  Patient is a 72 y.o. year old female who presents to the emergency department for evaluation of epigastric pain this afternoon.  Patient states that this morning she woke up with feeling \"icky.\"  She states that she has had some nausea but no vomiting.  Then while watching TV this afternoon she felt first a cold feeling in her chest that then turned warm and traveled up her throat.  She denies any pain at this time.  She states that she had a similar episode once before when she was diagnosed with A-fib.  She became worried and decided to come to the ER to be checked out.  Patient reports that for the past 2 weeks she has been undergoing therapy for vertigo and has had a lot of nausea and dry heaves with this.  She denies fever/chills, vomiting, cough, congestion, shortness of breath, abdominal pain, dysuria or frequency. (Floresita Kim, APRN)    Pt has been having chest discomfort and tingling that radiates up to her throat since 1:00 PM. She describes the chest sensation as coldness in her central chest region. She notes she feels pressure, numbness, and tingling. She reports she is feeling better now. She admits nausea.     She says she has been chronically struggling with vertigo and had to do 7-8 maneuvers yesterday. She says she believes she did too many of these maneuvers. Pt has been prescribed Meclizine for vertigo and has been taking this for 2 months. She is in therapy for vertigo.     Pt reports compliance with her medication. She admits CHF and A-Fib.           Patient Care Team  Primary Care Provider: Wilder Morales MD    Past Medical History:     Allergies   Allergen Reactions   • Propafenone Other (See Comments)     Drops B/p   • Cephalexin " Unknown - Low Severity     Mouth sores   • Erythromycin Other (See Comments)     Stroke like   • Farxiga [Dapagliflozin] Other (See Comments)     Patient states she got a yeast infection   • Mercury Unknown - Low Severity   • Metoclopramide Other (See Comments)     Decreased blood pressure     • Moxifloxacin Hcl Unknown - Low Severity   • Penicillins Unknown - Low Severity     Past Medical History:   Diagnosis Date   • Asthma    • Burning mouth syndrome    • COPD (chronic obstructive pulmonary disease)    • Depression    • Diastolic CHF 6/24/2021   • Essential hypertension 6/20/2012   • GERD (gastroesophageal reflux disease)    • Hyperlipidemia    • Oral lesion    • Paroxysmal atrial fibrillation 05/24/2021     Past Surgical History:   Procedure Laterality Date   • BLADDER SURGERY     • CARPAL TUNNEL RELEASE     • COLONOSCOPY     • ENDOSCOPY  2020   • GALLBLADDER SURGERY  2003   • HYSTERECTOMY     • UPPER GASTROINTESTINAL ENDOSCOPY       Family History   Problem Relation Age of Onset   • Stroke Mother    • Heart disease Mother    • Diabetes Mother    • Pancreatic cancer Mother    • Heart disease Father    • Heart disease Sister    • Cervical cancer Sister    • Uterine cancer Sister    • Heart disease Brother    • Stroke Other         Maternal grandparent/Paternal grandparent   • Heart disease Other         Maternal grandparent/Paternal grandparent   • Stroke Maternal Aunt    • Heart disease Maternal Aunt        Home Medications:  Prior to Admission medications    Medication Sig Start Date End Date Taking? Authorizing Provider   acetaminophen (TYLENOL) 500 MG tablet Take 500 mg by mouth.    Provider, MD Rita   albuterol sulfate  (90 Base) MCG/ACT inhaler Inhale 2 puffs Every 4 (Four) Hours As Needed for Wheezing. 3/15/22   Neris Valencia APRN   atorvastatin (LIPITOR) 80 MG tablet Take 80 mg by mouth Daily.    Provider, MD Rita   baclofen (LIORESAL) 10 MG tablet Take 10 mg by mouth 3 (Three)  Times a Day.    Rita Valadez MD   Cholecalciferol (VITAMIN D3 PO) Take 2,000 Units by mouth Daily.    Rita Valadez MD   citalopram (CeleXA) 20 MG tablet Take 20 mg by mouth Daily.    Rita Valadez MD   diphenhydrAMINE (BENADRYL) 25 MG tablet Take 25 mg by mouth Every 6 (Six) Hours As Needed.    Rita Valadez MD   Eliquis 5 MG tablet tablet TAKE 1 TABLET BY MOUTH TWICE A DAY 3/30/22   Chris Manzo MD   estradiol (ESTRACE) 0.1 MG/GM vaginal cream estradiol 0.01% (0.1 mg/gram) vaginal cream    Rita Valadez MD   famotidine (PEPCID) 20 MG tablet Take 20 mg by mouth every night at bedtime. 7/16/21   Rita Valadez MD   Farxiga 10 MG tablet Take 1 tablet by mouth Daily. 8/24/22   Rita Valadez MD   flecainide (TAMBOCOR) 50 MG tablet Take 50 mg by mouth 2 (Two) Times a Day. 8/2/21   Rita Valadez MD   furosemide (LASIX) 20 MG tablet furosemide 20 mg tablet 5/6/21   Rita Valadez MD   furosemide (LASIX) 40 MG tablet Take 20 mg by mouth. 8/18/22   Rita Valadez MD   gabapentin (NEURONTIN) 300 MG capsule Take 300 mg by mouth Daily.    Rita Valadez MD   hydroCHLOROthiazide (HYDRODIURIL) 25 MG tablet Take 25 mg by mouth Daily.    Rita Valadez MD   HYDROcodone-acetaminophen (NORCO)  MG per tablet hydrocodone 10 mg-acetaminophen 325 mg tablet 5/28/21   Rita Valadez MD   hydrOXYzine (ATARAX) 25 MG tablet hydroxyzine HCl 25 mg tablet    Rita Valadez MD   meclizine (ANTIVERT) 25 MG tablet Take 1 tablet by mouth 4 (Four) Times a Day As Needed for Dizziness. 12/16/21   Efra Harrison MD   metoprolol succinate XL (TOPROL-XL) 25 MG 24 hr tablet TAKE 1/2 TABLET BY MOUTH EVERY DAY 3/30/22   Chris Manzo MD   mirtazapine (REMERON) 7.5 MG half tablet Take 15 mg by mouth Every Night.    Rita Valadez MD   multivitamin (THERAGRAN) tablet tablet Take  by mouth.    Rita Valadez MD   ondansetron  ODT (ZOFRAN-ODT) 8 MG disintegrating tablet Place 1 tablet on the tongue Every 8 (Eight) Hours As Needed for Nausea or Vomiting. 22   Joe Llanos PA   pantoprazole (PROTONIX) 40 MG EC tablet Take 1 tablet by mouth Daily. 22   Lore Espitia APRN   polyethylene glycol (MIRALAX) 17 g packet Take 17 g by mouth Daily.    ProviderRita MD   potassium chloride (K-DUR,KLOR-CON) 20 MEQ CR tablet potassium chloride ER 20 mEq tablet,extended release    ProviderRita MD   PROBIOTIC PRODUCT PO Take  by mouth.    ProviderRita MD   prochlorperazine (COMPAZINE) 5 MG tablet prochlorperazine maleate 5 mg oral tablet take 1 tablet (5 mg) by oral route 4 times per day   Active    ProviderRtia MD   promethazine (PHENERGAN) 25 MG tablet promethazine 25 mg oral tablet take 1 tablet (25 mg) by oral route every 6 hours as needed   Active    ProviderRita MD   senna (SENOKOT) 8.6 MG tablet Take 1 tablet by mouth Daily.    Emergency, Nurse Epic, RN   spironolactone-hydrochlorothiazide (ALDACTAZIDE) 25-25 MG tablet spironolactone 25 mg-hydrochlorothiazide 25 mg tablet    ProviderRita MD   tiotropium bromide-olodaterol (Stiolto Respimat) 2.5-2.5 MCG/ACT aerosol solution inhaler Inhale 2 puffs Daily. 23   Neris Valencia APRN   traZODone (DESYREL) 50 MG tablet trazodone 50 mg tablet    ProviderRita MD   Zoster Vac Recomb Adjuvanted (Shingrix) 50 MCG/0.5ML reconstituted suspension Shingrix (PF) 50 mcg/0.5 mL intramuscular suspension, kit    ProviderRita MD        Social History:   Social History     Tobacco Use   • Smoking status: Former     Packs/day: 0.50     Years: 25.00     Pack years: 12.50     Types: Cigarettes     Quit date:      Years since quittin.1     Passive exposure: Never   • Smokeless tobacco: Never   Vaping Use   • Vaping Use: Never used   Substance Use Topics   • Alcohol use: Never     Comment: Does not drink   • Drug use: Never  "        Review of Systems:  Review of Systems   Constitutional: Negative for chills and fever.   HENT: Negative for sore throat.    Eyes: Negative for photophobia.   Respiratory: Negative for shortness of breath.    Cardiovascular: Positive for chest pain.   Gastrointestinal: Positive for nausea. Negative for abdominal pain, diarrhea and vomiting.   Genitourinary: Negative for dysuria.   Musculoskeletal: Negative for neck pain.   Skin: Negative for wound.   Neurological: Positive for dizziness and numbness. Negative for headaches.   All other systems reviewed and are negative.       Physical Exam:  /83   Pulse 60   Temp 98.7 °F (37.1 °C) (Oral)   Resp 12   Ht 172.7 cm (68\")   Wt 119 kg (261 lb 14.5 oz)   SpO2 92%   BMI 39.82 kg/m²     Physical Exam  Vitals and nursing note reviewed.   Constitutional:       General: She is not in acute distress.  HENT:      Head: Normocephalic and atraumatic.   Eyes:      Extraocular Movements: Extraocular movements intact.   Cardiovascular:      Rate and Rhythm: Normal rate and regular rhythm.   Pulmonary:      Effort: Pulmonary effort is normal. No respiratory distress.      Breath sounds: Normal breath sounds.   Abdominal:      General: Abdomen is flat.      Palpations: Abdomen is soft.      Tenderness: There is no abdominal tenderness.   Musculoskeletal:         General: Normal range of motion.      Cervical back: Normal range of motion and neck supple.   Skin:     General: Skin is warm and dry.      Capillary Refill: Capillary refill takes less than 2 seconds.   Neurological:      Mental Status: She is alert and oriented to person, place, and time. Mental status is at baseline.                  Procedures:  Procedures      Medical Decision Making:    HEART SCORE  History: Slightly Suspicious (0)  ECG: Nonspecific repolarization or LBBB (1)  Age: >= 65 years old (2)  Risk factors: 1-2 Risk Factors (1)  Troponin: normal (0)    This patient's HEART score is " 4.    According to the HEART Score Study: Score (Risk of adverse cardiac event in the next 14 days)  Scores 0-3: (0.9-1.7%) In the HEART Score study, these patients were discharged home.  Scores 4-6: (12-16.6%)  In the HEART Score study, these patients were admitted to the hospital.  Scores ?7: (50-65%) In the HEART Score study, these patients were candidates for early invasive measures.   Final disposition is based on individual provider judgement and current clinical situation.    Comorbidities that affect care:    Asthma, Atrial Fibrillation, Congestive Heart Failure, COPD    External Notes reviewed:    None      The following orders were placed and all results were independently analyzed by me:  Orders Placed This Encounter   Procedures   • XR Chest 1 View   • Martha Draw   • High Sensitivity Troponin T   • Comprehensive Metabolic Panel   • Lipase   • BNP   • Magnesium   • CBC Auto Differential   • High Sensitivity Troponin T 2Hr   • Undress & Gown   • Continuous Pulse Oximetry   • ECG 12 Lead ED Triage Standing Order; Chest Pain   • CBC & Differential   • Green Top (Gel)   • Lavender Top   • Gold Top - SST   • Light Blue Top       Medications Given in the Emergency Department:  Medications   aspirin chewable tablet 324 mg (324 mg Oral Given 2/17/23 1537)   ondansetron (ZOFRAN) injection 4 mg (4 mg Intravenous Given 2/17/23 2026)        ED Course:    The patient was initially evaluated in the triage area where orders were placed. The patient was later dispositioned by Jermaine Bar DO.      The patient was advised to stay for completion of workup which includes but is not limited to communication of labs and radiological results, reassessment and plan. The patient was advised that leaving prior to disposition by a provider could result in critical findings that are not communicated to the patient.     ED Course as of 02/22/23 1613   Fri Feb 17, 2023   1854 High Sensitivity Troponin T 2Hr [NL]   2226 BUN(!): 24  [NL]      ED Course User Index  [NL] Jermaine Bar DO       Labs:    Lab Results (last 24 hours)     ** No results found for the last 24 hours. **           Imaging:    No Radiology Exams Resulted Within Past 24 Hours      Differential Diagnosis and Discussion:      Chest Pain:  Based on the patient's signs and symptoms, I considered aortic dissection, myocardial infaction, pulmonary embolism, cardiac tamponade, pericarditis, pneumothorax, musculoskeletal chest pain and other differential diagnosis as an etiology of the patient's chest pain.     All labs were reviewed and interpreted by me.  All X-rays were independently reviewed by me.  EKG was interpreted by me.    MDM   72-year-old female patient presented with complaint of epigastric pain that radiated into her chest and she describes it as a cold feeling.  Patient's cardiac work-up is negative for any acute findings.  Patient is stable for discharge with outpatient follow-up.      Patient Care Considerations:          Consultants/Shared Management Plan:    None    Social Determinants of Health:    Patient is independent, reliable, and has access to care.       Disposition and Care Coordination:    Discharged: The patient is suitable and stable for discharge with no need for consideration of observation or admission.    I have explained discharge medications and the need for follow up with the patient/caretakers. This was also printed in the discharge instructions. Patient was discharged with the following medications and follow up:      Medication List      New Prescriptions    diazePAM 2 MG tablet  Commonly known as: VALIUM  Take 1 tablet by mouth Every 6 (Six) Hours As Needed (As needed for vertigo).           Where to Get Your Medications      These medications were sent to [facility]/pharmacy #[id number] - [place], [place] - [address] - [phone] [facility] [phone] FX  [address] [place] [address]    Hours: 24-hours Phone: [phone]    · diazePAM 2 MG tablet      Wilder Morales MD  [address]  [facility] [address]  [phone]    Schedule an appointment as soon as possible for a visit       Salvatore López MD  [address]  [address]  [place] [address]  [phone]    Schedule an appointment as soon as possible for a visit          Final diagnoses:   Vertigo   Chest pain, unspecified type        ED Disposition     ED Disposition   Discharge    Condition   Stable    Comment   --             This medical record created using voice recognition software.      Documentation assistance provided by Gt Villanueva acting as scribe for Jermaine Bar DO. Information recorded by the scribe was done at my direction and has been verified and validated by me.        Gt Villanueva  02/17/23 1906       Jermaine Bar DO  02/22/23 1614

## 2023-02-18 LAB
QT INTERVAL: 461 MS
QT INTERVAL: 486 MS

## 2023-02-18 NOTE — DISCHARGE INSTRUCTIONS
Recommend follow-up with Dr. López, ear nose and throat, for further evaluation of your vertigo.  Recommend follow-up with your primary care provider to arrange an outpatient cardiac stress test to further evaluate chest pain.

## 2023-03-27 RX ORDER — APIXABAN 5 MG/1
TABLET, FILM COATED ORAL
Qty: 180 TABLET | Refills: 3 | OUTPATIENT
Start: 2023-03-27

## 2023-04-06 ENCOUNTER — TELEPHONE (OUTPATIENT)
Dept: GASTROENTEROLOGY | Facility: CLINIC | Age: 73
End: 2023-04-06

## 2023-04-06 ENCOUNTER — OFFICE VISIT (OUTPATIENT)
Dept: GASTROENTEROLOGY | Facility: CLINIC | Age: 73
End: 2023-04-06
Payer: MEDICARE

## 2023-04-06 VITALS
DIASTOLIC BLOOD PRESSURE: 70 MMHG | WEIGHT: 253 LBS | HEIGHT: 68 IN | HEART RATE: 73 BPM | SYSTOLIC BLOOD PRESSURE: 123 MMHG | BODY MASS INDEX: 38.34 KG/M2

## 2023-04-06 DIAGNOSIS — K58.2 IRRITABLE BOWEL SYNDROME WITH BOTH CONSTIPATION AND DIARRHEA: Primary | ICD-10-CM

## 2023-04-06 DIAGNOSIS — R11.0 NAUSEA: ICD-10-CM

## 2023-04-06 DIAGNOSIS — K21.9 GASTROESOPHAGEAL REFLUX DISEASE, UNSPECIFIED WHETHER ESOPHAGITIS PRESENT: ICD-10-CM

## 2023-04-06 DIAGNOSIS — K31.84 GASTROPARESIS: ICD-10-CM

## 2023-04-06 PROBLEM — Z79.01 CHRONIC ANTICOAGULATION: Status: ACTIVE | Noted: 2021-09-24

## 2023-04-06 PROBLEM — I50.30 HEART FAILURE WITH PRESERVED EJECTION FRACTION: Status: ACTIVE | Noted: 2022-02-19

## 2023-04-06 PROBLEM — M50.30 DDD (DEGENERATIVE DISC DISEASE), CERVICAL: Status: ACTIVE | Noted: 2023-04-06

## 2023-04-06 PROBLEM — F32.A DEPRESSION: Status: ACTIVE | Noted: 2023-04-06

## 2023-04-06 PROBLEM — N95.2 ATROPHIC VAGINITIS: Status: ACTIVE | Noted: 2018-05-29

## 2023-04-06 PROBLEM — K14.6 BURNING MOUTH SYNDROME: Status: ACTIVE | Noted: 2023-04-06

## 2023-04-06 PROCEDURE — 3074F SYST BP LT 130 MM HG: CPT | Performed by: NURSE PRACTITIONER

## 2023-04-06 PROCEDURE — 99214 OFFICE O/P EST MOD 30 MIN: CPT | Performed by: NURSE PRACTITIONER

## 2023-04-06 PROCEDURE — 3078F DIAST BP <80 MM HG: CPT | Performed by: NURSE PRACTITIONER

## 2023-04-06 RX ORDER — PANTOPRAZOLE SODIUM 40 MG/1
40 TABLET, DELAYED RELEASE ORAL DAILY
Qty: 90 TABLET | Refills: 2 | Status: SHIPPED | OUTPATIENT
Start: 2023-04-06

## 2023-04-06 NOTE — PROGRESS NOTES
Chief Complaint     Heartburn and Irritable Bowel Syndrome    History of Present Illness     Nica Traylor is a 72 y.o. female who presents to [facility] for follow-up of IBS and GERD.      She has been dealing with vertigo and gout.  She reports that she steroids last week, but is still dealing with inflammation.      She reports that she's taking miralax daily to control bowel pattern.      Reports nausea that's associated with vertigo.  Has a history of gastroparesis.  She previously took something for nausea that she felt was effective, but she's unsure of the name of it.  She doesn't get much relief with zofran or phenergan.       History      Past Medical History:   Diagnosis Date   • Asthma    • Burning mouth syndrome    • COPD (chronic obstructive pulmonary disease)    • Depression    • Diastolic CHF 6/24/2021   • Essential hypertension 6/20/2012   • GERD (gastroesophageal reflux disease)    • Hyperlipidemia    • Oral lesion    • Paroxysmal atrial fibrillation 05/24/2021     Past Surgical History:   Procedure Laterality Date   • BLADDER SURGERY     • CARPAL TUNNEL RELEASE     • COLONOSCOPY     • ENDOSCOPY  2020   • GALLBLADDER SURGERY  2003   • HYSTERECTOMY     • UPPER GASTROINTESTINAL ENDOSCOPY       Family History   Problem Relation Age of Onset   • Stroke Mother    • Heart disease Mother    • Diabetes Mother    • Pancreatic cancer Mother    • Heart disease Father    • Heart disease Sister    • Cervical cancer Sister    • Uterine cancer Sister    • Heart disease Brother    • Stroke Other         Maternal grandparent/Paternal grandparent   • Heart disease Other         Maternal grandparent/Paternal grandparent   • Stroke Maternal Aunt    • Heart disease Maternal Aunt         Current Medications       Current Outpatient Medications:   •  acetaminophen (TYLENOL) 500 MG tablet, Take 1 tablet by mouth., Disp: , Rfl:   •  albuterol sulfate  (90 Base) MCG/ACT inhaler,  Inhale 2 puffs Every 4 (Four) Hours As Needed for Wheezing., Disp: 54 g, Rfl: 3  •  atorvastatin (LIPITOR) 80 MG tablet, Take 1 tablet by mouth Daily., Disp: , Rfl:   •  baclofen (LIORESAL) 10 MG tablet, Take 1 tablet by mouth 3 (Three) Times a Day., Disp: , Rfl:   •  Cholecalciferol (VITAMIN D3 PO), Take 2,000 Units by mouth Daily., Disp: , Rfl:   •  citalopram (CeleXA) 20 MG tablet, Take 1 tablet by mouth Daily., Disp: , Rfl:   •  diazePAM (VALIUM) 2 MG tablet, Take 1 tablet by mouth Every 6 (Six) Hours As Needed (As needed for vertigo)., Disp: 10 tablet, Rfl: 0  •  diphenhydrAMINE (BENADRYL) 25 MG tablet, Take 1 tablet by mouth Every 6 (Six) Hours As Needed., Disp: , Rfl:   •  Eliquis 5 MG tablet tablet, TAKE 1 TABLET BY MOUTH TWICE A DAY, Disp: 180 tablet, Rfl: 3  •  estradiol (ESTRACE) 0.1 MG/GM vaginal cream, estradiol 0.01% (0.1 mg/gram) vaginal cream, Disp: , Rfl:   •  famotidine (PEPCID) 20 MG tablet, Take 1 tablet by mouth every night at bedtime., Disp: , Rfl:   •  Farxiga 10 MG tablet, Take 10 mg by mouth Daily., Disp: , Rfl:   •  flecainide (TAMBOCOR) 50 MG tablet, Take 1 tablet by mouth 2 (Two) Times a Day., Disp: , Rfl:   •  furosemide (LASIX) 20 MG tablet, furosemide 20 mg tablet, Disp: , Rfl:   •  furosemide (LASIX) 40 MG tablet, Take 20 mg by mouth., Disp: , Rfl:   •  gabapentin (NEURONTIN) 300 MG capsule, Take 1 capsule by mouth Daily., Disp: , Rfl:   •  hydroCHLOROthiazide (HYDRODIURIL) 25 MG tablet, Take 1 tablet by mouth Daily., Disp: , Rfl:   •  HYDROcodone-acetaminophen (NORCO)  MG per tablet, hydrocodone 10 mg-acetaminophen 325 mg tablet, Disp: , Rfl:   •  hydrOXYzine (ATARAX) 25 MG tablet, hydroxyzine HCl 25 mg tablet, Disp: , Rfl:   •  meclizine (ANTIVERT) 25 MG tablet, Take 1 tablet by mouth 4 (Four) Times a Day As Needed for Dizziness., Disp: 60 tablet, Rfl: 0  •  metoprolol succinate XL (TOPROL-XL) 25 MG 24 hr tablet, TAKE 1/2 TABLET BY MOUTH EVERY DAY, Disp: 45 tablet, Rfl: 3  •   mirtazapine (REMERON) 7.5 MG half tablet, Take 2 half tablet by mouth Every Night., Disp: , Rfl:   •  multivitamin (THERAGRAN) tablet tablet, Take  by mouth., Disp: , Rfl:   •  ondansetron ODT (ZOFRAN-ODT) 8 MG disintegrating tablet, Place 1 tablet on the tongue Every 8 (Eight) Hours As Needed for Nausea or Vomiting., Disp: 12 tablet, Rfl: 0  •  pantoprazole (PROTONIX) 40 MG EC tablet, Take 1 tablet by mouth Daily., Disp: 90 tablet, Rfl: 2  •  polyethylene glycol (MIRALAX) 17 g packet, Take 17 g by mouth Daily., Disp: , Rfl:   •  potassium chloride (K-DUR,KLOR-CON) 20 MEQ CR tablet, potassium chloride ER 20 mEq tablet,extended release, Disp: , Rfl:   •  PROBIOTIC PRODUCT PO, Take  by mouth., Disp: , Rfl:   •  prochlorperazine (COMPAZINE) 5 MG tablet, prochlorperazine maleate 5 mg oral tablet take 1 tablet (5 mg) by oral route 4 times per day   Active, Disp: , Rfl:   •  promethazine (PHENERGAN) 25 MG tablet, promethazine 25 mg oral tablet take 1 tablet (25 mg) by oral route every 6 hours as needed   Active, Disp: , Rfl:   •  senna (SENOKOT) 8.6 MG tablet, Take 1 tablet by mouth Daily., Disp: , Rfl:   •  spironolactone-hydrochlorothiazide (ALDACTAZIDE) 25-25 MG tablet, spironolactone 25 mg-hydrochlorothiazide 25 mg tablet, Disp: , Rfl:   •  tiotropium bromide-olodaterol (Stiolto Respimat) 2.5-2.5 MCG/ACT aerosol solution inhaler, Inhale 2 puffs Daily., Disp: 3 each, Rfl: 3  •  traZODone (DESYREL) 50 MG tablet, trazodone 50 mg tablet, Disp: , Rfl:   •  Zoster Vac Recomb Adjuvanted (Shingrix) 50 MCG/0.5ML reconstituted suspension, Shingrix (PF) 50 mcg/0.5 mL intramuscular suspension, kit, Disp: , Rfl:   •  colchicine 0.6 MG tablet, Take 0.5 tablets by mouth Daily for 5 days., Disp: 3 tablet, Rfl: 0     Allergies     Allergies   Allergen Reactions   • Propafenone Other (See Comments)     Drops B/p   • Cephalexin Unknown - Low Severity     Mouth sores   • Erythromycin Other (See Comments)     Stroke like   • Farxiga  "[Dapagliflozin] Other (See Comments)     Patient states she got a yeast infection   • Mercury Unknown - Low Severity   • Metoclopramide Other (See Comments)     Decreased blood pressure     • Moxifloxacin Hcl Unknown - Low Severity   • Penicillins Unknown - Low Severity       Social History       Social History     Social History Narrative    Lives alone.         Objective       /70 (BP Location: Left arm, Patient Position: Sitting, Cuff Size: Adult)   Pulse 73   Ht 172.7 cm (68\")   Wt 115 kg (253 lb)   BMI 38.47 kg/m²       Physical Exam    Results       Result Review :                     Assessment and Plan              Diagnoses and all orders for this visit:    1. Irritable bowel syndrome with both constipation and diarrhea (Primary)    2. Gastroesophageal reflux disease, unspecified whether esophagitis present  -     pantoprazole (PROTONIX) 40 MG EC tablet; Take 1 tablet by mouth Daily.  Dispense: 90 tablet; Refill: 2    3. Nausea    4. Gastroparesis      Encouraged f/u with PCP regarding gout.  She will call with the name of nausea medication.      Follow Up     Follow Up   Return in about 9 months (around 1/6/2024) for GERD.  Patient was given instructions and counseling regarding her condition or for health maintenance advice. Please see specific information pulled into the AVS if appropriate.     "

## 2023-04-07 ENCOUNTER — TELEPHONE (OUTPATIENT)
Dept: PULMONOLOGY | Facility: CLINIC | Age: 73
End: 2023-04-07
Payer: MEDICARE

## 2023-04-07 ENCOUNTER — TELEPHONE (OUTPATIENT)
Dept: GASTROENTEROLOGY | Facility: CLINIC | Age: 73
End: 2023-04-07
Payer: MEDICARE

## 2023-04-07 NOTE — TELEPHONE ENCOUNTER
Patient needs to change her pharmacy from [facility] to [facility] on [facility]. She said if Valentina sent in any prescriptions, please resend them to [facility].

## 2023-04-10 RX ORDER — PROCHLORPERAZINE MALEATE 5 MG/1
5 TABLET ORAL EVERY 6 HOURS PRN
Qty: 30 TABLET | Refills: 1 | Status: SHIPPED | OUTPATIENT
Start: 2023-04-10

## 2023-04-20 RX ORDER — PROCHLORPERAZINE MALEATE 5 MG/1
TABLET ORAL
Qty: 30 TABLET | Refills: 1 | OUTPATIENT
Start: 2023-04-20

## 2023-09-18 ENCOUNTER — OFFICE VISIT (OUTPATIENT)
Dept: PULMONOLOGY | Facility: CLINIC | Age: 73
End: 2023-09-18
Payer: MEDICARE

## 2023-09-18 VITALS
HEIGHT: 68 IN | DIASTOLIC BLOOD PRESSURE: 74 MMHG | HEART RATE: 57 BPM | BODY MASS INDEX: 39.4 KG/M2 | SYSTOLIC BLOOD PRESSURE: 123 MMHG | WEIGHT: 260 LBS | OXYGEN SATURATION: 90 % | TEMPERATURE: 98.2 F | RESPIRATION RATE: 18 BRPM

## 2023-09-18 DIAGNOSIS — J43.2 CENTRILOBULAR EMPHYSEMA: Primary | ICD-10-CM

## 2023-09-18 RX ORDER — SPIRONOLACTONE 25 MG/1
25 TABLET ORAL DAILY
COMMUNITY

## 2023-09-18 RX ORDER — BUDESONIDE AND FORMOTEROL FUMARATE DIHYDRATE 160; 4.5 UG/1; UG/1
2 AEROSOL RESPIRATORY (INHALATION)
COMMUNITY

## 2023-09-18 RX ORDER — ALLOPURINOL 300 MG/1
300 TABLET ORAL DAILY
COMMUNITY

## 2023-09-19 NOTE — ASSESSMENT & PLAN NOTE
Does have emphysema without obstruction  Suspect some of her shortness of breath is related to her emphysema but mostly related to her fluid status    Was unable to tolerate anticholinergics due to mouth soreness    We will continue Symbicort and albuterol

## 2023-09-19 NOTE — PROGRESS NOTES
"Chief Complaint  Pulmonary Emphysema, Observed sleep apnea, and Follow-up (3-4 Months )    Subjective        Nica Traylor presents to [facility]INE  History of Present Illness  Follow-up for emphysema  Does have shortness of breath  Feels that the shortness of breath is related to her heart failure  Does report compliance with bronchodilator therapies  Feel that they do help with her symptoms but feel her symptoms are mostly related to her fluid  Objective   Vital Signs:  /74 (BP Location: Left arm, Patient Position: Sitting, Cuff Size: Large Adult)   Pulse 57   Temp 98.2 °F (36.8 °C) (Temporal)   Resp 18   Ht 172.7 cm (68\")   Wt 118 kg (260 lb)   SpO2 90% Comment: room air  BMI 39.53 kg/m²   Estimated body mass index is 39.53 kg/m² as calculated from the following:    Height as of this encounter: 172.7 cm (68\").    Weight as of this encounter: 118 kg (260 lb).             Physical Exam   Pleasant female  Speaks full sentence without difficulties  Does have some lower extremity edema  No wheezes rales or rhonchi present  Alert and oriented  Result Review :                   Assessment and Plan   Diagnoses and all orders for this visit:    1. Centrilobular emphysema (Primary)  Assessment & Plan:  Does have emphysema without obstruction  Suspect some of her shortness of breath is related to her emphysema but mostly related to her fluid status    Was unable to tolerate anticholinergics due to mouth soreness    We will continue Symbicort and albuterol        Encourage patient to get the high-dose influenza vaccine we were unable to give today in the office as we do not have this medication       Follow Up   Return in about 4 months (around 1/18/2024).  Patient was given instructions and counseling regarding her condition or for health maintenance advice. Please see specific information pulled into the AVS if appropriate.         "

## 2023-10-25 NOTE — TELEPHONE ENCOUNTER
Patient called in and just wanted to inform  that she changed pharmacy. Her new pharmacy is [facility] and [facility]   None

## 2023-10-30 ENCOUNTER — TRANSCRIBE ORDERS (OUTPATIENT)
Dept: ADMINISTRATIVE | Facility: HOSPITAL | Age: 73
End: 2023-10-30
Payer: MEDICARE

## 2023-10-30 DIAGNOSIS — N18.32 CHRONIC KIDNEY DISEASE, STAGE 3B: Primary | ICD-10-CM

## 2023-12-11 ENCOUNTER — TELEPHONE (OUTPATIENT)
Dept: GASTROENTEROLOGY | Facility: CLINIC | Age: 73
End: 2023-12-11
Payer: MEDICARE

## 2023-12-11 RX ORDER — PROCHLORPERAZINE MALEATE 5 MG/1
5 TABLET ORAL EVERY 6 HOURS PRN
Qty: 30 TABLET | Refills: 1 | Status: SHIPPED | OUTPATIENT
Start: 2023-12-11

## 2023-12-11 NOTE — TELEPHONE ENCOUNTER
Patient requesting a refill of prochlorperazine, order pended.  Last o/v-4/6/23  Last refill-4/10/23  Next o/v-1/9/24

## 2023-12-11 NOTE — TELEPHONE ENCOUNTER
Hub staff attempted to follow warm transfer process and was unsuccessful     Caller: Nica Traylor    Relationship to patient: Self    Best call back number: [phone]    Patient is needing: PT IS CALLING IN TO SEE ABOUT HER MEDICATION PROCHLORPERAZINE. PT IREQUESTING A CALL BACK FROM MICHELLE LLANES. IF NOT ABLE TO REACH PT. IT IS OKAY TO LEAVE AN VOICEMAIL.

## 2023-12-20 RX ORDER — PROCHLORPERAZINE MALEATE 5 MG/1
5 TABLET ORAL EVERY 6 HOURS PRN
Qty: 30 TABLET | Refills: 1 | OUTPATIENT
Start: 2023-12-20

## 2023-12-20 NOTE — TELEPHONE ENCOUNTER
Patient requesting a refil of prochlorperazine, it says on the note that she is asking for an advanced refill.  Last o/v-4/6/23  Last refill-12/11/23  Next o/v-1/9/24

## 2024-01-09 ENCOUNTER — OFFICE VISIT (OUTPATIENT)
Dept: GASTROENTEROLOGY | Facility: CLINIC | Age: 74
End: 2024-01-09
Payer: MEDICARE

## 2024-01-09 VITALS
HEIGHT: 68 IN | BODY MASS INDEX: 40.47 KG/M2 | DIASTOLIC BLOOD PRESSURE: 63 MMHG | HEART RATE: 63 BPM | SYSTOLIC BLOOD PRESSURE: 121 MMHG | WEIGHT: 267 LBS

## 2024-01-09 DIAGNOSIS — K58.2 IRRITABLE BOWEL SYNDROME WITH BOTH CONSTIPATION AND DIARRHEA: ICD-10-CM

## 2024-01-09 DIAGNOSIS — R11.0 NAUSEA: ICD-10-CM

## 2024-01-09 DIAGNOSIS — K31.84 GASTROPARESIS: Primary | ICD-10-CM

## 2024-01-09 PROBLEM — N18.32 STAGE 3B CHRONIC KIDNEY DISEASE: Status: ACTIVE | Noted: 2023-10-24

## 2024-01-09 NOTE — PROGRESS NOTES
Chief Complaint     Heartburn    History of Present Illness     Nica Traylor is a 73 y.o. female who presents to [facility] for follow-up of IBS-mixed, GERD, nausea and gastroparesis.      Admits nausea.  She feels that it related to vertigo.  She is scheduled for surgical evaluation later this month.      She feels that heartburn is controlled with daily protonix.    Constipation is controlled with daily miralax.        Reports that her PCP is considering started her on a GLP-1 for weight loss but the pharmacy is out of them currently.       History      Past Medical History:   Diagnosis Date    Asthma     Burning mouth syndrome     COPD (chronic obstructive pulmonary disease)     Depression     Diastolic CHF 6/24/2021    Essential hypertension 6/20/2012    GERD (gastroesophageal reflux disease)     Hyperlipidemia     Oral lesion     Paroxysmal atrial fibrillation 05/24/2021     Past Surgical History:   Procedure Laterality Date    BLADDER SURGERY      CARPAL TUNNEL RELEASE      COLONOSCOPY      ENDOSCOPY  2020    GALLBLADDER SURGERY  2003    HYSTERECTOMY      UPPER GASTROINTESTINAL ENDOSCOPY       Family History   Problem Relation Age of Onset    Stroke Mother     Heart disease Mother     Diabetes Mother     Pancreatic cancer Mother     Heart disease Father     Heart disease Sister     Cervical cancer Sister     Uterine cancer Sister     Heart disease Brother     Stroke Other         Maternal grandparent/Paternal grandparent    Heart disease Other         Maternal grandparent/Paternal grandparent    Stroke Maternal Aunt     Heart disease Maternal Aunt         Current Medications       Current Outpatient Medications:     acetaminophen (TYLENOL) 500 MG tablet, Take 1 tablet by mouth., Disp: , Rfl:     albuterol sulfate  (90 Base) MCG/ACT inhaler, Inhale 2 puffs Every 4 (Four) Hours As Needed for Wheezing., Disp: 54 g, Rfl: 3    allopurinol (ZYLOPRIM) 300 MG tablet, Take  1 tablet by mouth Daily., Disp: , Rfl:     atorvastatin (LIPITOR) 80 MG tablet, Take 1 tablet by mouth Daily., Disp: , Rfl:     baclofen (LIORESAL) 10 MG tablet, Take 1 tablet by mouth 3 (Three) Times a Day., Disp: , Rfl:     budesonide-formoterol (SYMBICORT) 160-4.5 MCG/ACT inhaler, Inhale 2 puffs., Disp: , Rfl:     Cholecalciferol (VITAMIN D3 PO), Take 2,000 Units by mouth Daily., Disp: , Rfl:     citalopram (CeleXA) 20 MG tablet, Take 1 tablet by mouth Daily., Disp: , Rfl:     diazePAM (VALIUM) 2 MG tablet, Take 1 tablet by mouth Every 6 (Six) Hours As Needed (As needed for vertigo)., Disp: 10 tablet, Rfl: 0    diphenhydrAMINE (BENADRYL) 25 MG tablet, Take 1 tablet by mouth Every 6 (Six) Hours As Needed., Disp: , Rfl:     Eliquis 5 MG tablet tablet, TAKE 1 TABLET BY MOUTH TWICE A DAY, Disp: 180 tablet, Rfl: 3    estradiol (ESTRACE) 0.1 MG/GM vaginal cream, estradiol 0.01% (0.1 mg/gram) vaginal cream, Disp: , Rfl:     Farxiga 10 MG tablet, Take 10 mg by mouth Daily., Disp: , Rfl:     flecainide (TAMBOCOR) 50 MG tablet, Take 1 tablet by mouth 2 (Two) Times a Day., Disp: , Rfl:     furosemide (LASIX) 40 MG tablet, Take 0.5 tablets by mouth., Disp: , Rfl:     gabapentin (NEURONTIN) 300 MG capsule, Take 1 capsule by mouth Daily., Disp: , Rfl:     HYDROcodone-acetaminophen (NORCO)  MG per tablet, hydrocodone 10 mg-acetaminophen 325 mg tablet, Disp: , Rfl:     meclizine (ANTIVERT) 25 MG tablet, Take 1 tablet by mouth 4 (Four) Times a Day As Needed for Dizziness., Disp: 60 tablet, Rfl: 0    metoprolol succinate XL (TOPROL-XL) 25 MG 24 hr tablet, TAKE 1/2 TABLET BY MOUTH EVERY DAY, Disp: 45 tablet, Rfl: 3    mirtazapine (REMERON) 7.5 MG half tablet, Take 2 half tablet by mouth Every Night., Disp: , Rfl:     multivitamin (THERAGRAN) tablet tablet, Take  by mouth., Disp: , Rfl:     ondansetron ODT (ZOFRAN-ODT) 8 MG disintegrating tablet, Place 1 tablet on the tongue Every 8 (Eight) Hours As Needed for Nausea or  "Vomiting., Disp: 12 tablet, Rfl: 0    pantoprazole (PROTONIX) 40 MG EC tablet, Take 1 tablet by mouth Daily., Disp: 90 tablet, Rfl: 2    polyethylene glycol (MIRALAX) 17 g packet, Take 17 g by mouth Daily., Disp: , Rfl:     potassium chloride (K-DUR,KLOR-CON) 20 MEQ CR tablet, potassium chloride ER 20 mEq tablet,extended release, Disp: , Rfl:     prochlorperazine (COMPAZINE) 5 MG tablet, TAKE 1 TABLET BY MOUTH EVERY 6 HOURS AS NEEDED FOR NAUSEA OR VOMITING, Disp: 30 tablet, Rfl: 1    promethazine (PHENERGAN) 25 MG tablet, , Disp: , Rfl:     spironolactone (ALDACTONE) 25 MG tablet, Take 1 tablet by mouth Daily., Disp: , Rfl:      Allergies     Allergies   Allergen Reactions    Propafenone Other (See Comments)     Drops B/p    Cephalexin Unknown - Low Severity     Mouth sores    Erythromycin Other (See Comments)     Stroke like    Farxiga [Dapagliflozin] Other (See Comments)     Patient states she got a yeast infection    Mercury Unknown - Low Severity    Metoclopramide Other (See Comments)     Decreased blood pressure      Moxifloxacin Hcl Unknown - Low Severity    Penicillins Unknown - Low Severity       Social History       Social History     Social History Narrative    Lives alone.         Objective       /63 (BP Location: Left arm, Patient Position: Sitting, Cuff Size: Large Adult)   Pulse 63   Ht 172.7 cm (68\")   Wt 121 kg (267 lb)   BMI 40.60 kg/m²       Physical Exam    Results       Result Review :    The following data was reviewed by: PRAVIN Jin on 01/09/2024:    CBC w/diff          7/3/2023    11:51 8/8/2023    14:06 8/22/2023    12:00   CBC w/Diff   WBC 9.69     11.90     9.81       RBC 4.45     4.35     4.36       Hemoglobin 11.4     11.0     11.5       Hematocrit 36.1     36.3     36.0       MCV 81.1     83.4     82.6       MCH 25.6     25.3     26.4       MCHC 31.6     30.3     31.9       RDW 17.3     17.7     18.0       Platelets 356     385     393       Neutrophil Rel % 56.0 "     66.2     67.5       Immature Granulocyte Rel % 0.4     0.6     0.6       Lymphocyte Rel % 29.1     22.0     19.0       Monocyte Rel % 12.0     9.4     10.3       Eosinophil Rel % 2.0     1.3     1.9       Basophil Rel % 0.5     0.5     0.7          Details          This result is from an external source.             CMP          2/17/2023    15:33 2/21/2023    12:58   CMP   Glucose 110     Glucose  120       BUN 24  30       Creatinine 1.50  2.02       EGFR  Am  29       EGFR 36.9     Sodium 138  140       Potassium 3.8  3.6       Chloride 96  99       Calcium 9.9  10.3       Total Protein 7.8     Albumin 4.9     Globulin 2.9     Total Bilirubin 0.7     Alkaline Phosphatase 100     AST (SGOT) 21     ALT (SGPT) 18     Albumin/Globulin Ratio 1.7     BUN/Creatinine Ratio 16.0  14.9       Anion Gap 14.4  11          Details          This result is from an external source.                        Assessment and Plan              Diagnoses and all orders for this visit:    1. Gastroparesis (Primary)    2. Nausea    3. Irritable bowel syndrome with both constipation and diarrhea      Discussed with patient that GLP-1 medications can delay gastric emptying.  She has known gastroparesis and these medications would likely make that worse.  Continue protonix as reflux is controlled at current dose.        Follow Up     Follow Up   Return in about 1 year (around 1/9/2025) for GERD and gastroparesis .  Patient was given instructions and counseling regarding her condition or for health maintenance advice. Please see specific information pulled into the AVS if appropriate.

## 2024-01-18 ENCOUNTER — OFFICE VISIT (OUTPATIENT)
Dept: PULMONOLOGY | Facility: CLINIC | Age: 74
End: 2024-01-18
Payer: MEDICARE

## 2024-01-18 VITALS
DIASTOLIC BLOOD PRESSURE: 63 MMHG | TEMPERATURE: 97.6 F | RESPIRATION RATE: 16 BRPM | OXYGEN SATURATION: 95 % | HEIGHT: 68 IN | BODY MASS INDEX: 41.19 KG/M2 | WEIGHT: 271.8 LBS | HEART RATE: 62 BPM | SYSTOLIC BLOOD PRESSURE: 122 MMHG

## 2024-01-18 DIAGNOSIS — F17.211 NICOTINE DEPENDENCE, CIGARETTES, IN REMISSION: ICD-10-CM

## 2024-01-18 DIAGNOSIS — I50.32 CHRONIC DIASTOLIC CONGESTIVE HEART FAILURE: ICD-10-CM

## 2024-01-18 DIAGNOSIS — G47.33 OSA (OBSTRUCTIVE SLEEP APNEA): ICD-10-CM

## 2024-01-18 DIAGNOSIS — E66.01 OBESITY, CLASS III, BMI 40-49.9 (MORBID OBESITY): ICD-10-CM

## 2024-01-18 DIAGNOSIS — J43.2 CENTRILOBULAR EMPHYSEMA: Primary | ICD-10-CM

## 2024-01-18 NOTE — PROGRESS NOTES
Primary Care Provider  Wilder Morales MD     Referring Provider  No ref. provider found     Chief Complaint  Emphysema, Shortness of Breath, and Follow-up (4 month f/up )    Subjective          Nica Traylor presents to [facility]ULMON[facility] CRITICAL CARE MEDICINE  History of Present Illness  Nica Traylor is a 73 y.o. female patient of Dr. Reynolds here for management of centrilobular emphysema, tobacco abuse of cigarettes in remission, obstructive sleep apnea under the care of sleep medicine, congestive heart failure and dyspnea on exertion.     Patient states she is doing well since her last office visit.  She denies using any antibiotics or steroids for her lungs.  She denies any current fevers or chills.  Her shortness of breath is mild in severity, worse with exertion and improved with rest.  She continues to use Symbicort as prescribed.  She states she tries not to use her albuterol.  She states that most of her shortness of breath, she believes is related to her heart.  She does see a cardiologist every 6 months.  She continues wear her CPAP and is being managed by sleep medicine.  Overall, she states that she is doing well and has no additional concerns at this time.  She is able to perform her ADLs at difficulty.  She is up-to-date with her COVID, flu and pneumonia vaccines.  Patient states that she is also trying to lose weight and will hopefully start injections to help with her weight loss in a few months when they are back in stock.  Patient states that she does ride a stationary bike for 30 minutes every day.     Her history of smoking is   Tobacco Use: Medium Risk (1/18/2024)    Patient History     Smoking Tobacco Use: Former     Smokeless Tobacco Use: Never     Passive Exposure: Never   .    Review of Systems   Constitutional:  Negative for chills, fatigue, fever, unexpected weight gain and unexpected weight loss.   HENT:  Congestion: Nasal.    Respiratory:  Positive  for shortness of breath. Negative for apnea, cough and wheezing.         Negative for Hemoptysis     Cardiovascular:  Negative for chest pain, palpitations and leg swelling.   Skin:         Negative for cyanosis      Sleep: Negative for Excessive daytime sleepiness  Negative for morning headaches  Negative for Snoring    Family History   Problem Relation Age of Onset    Stroke Mother     Heart disease Mother     Diabetes Mother     Pancreatic cancer Mother     Heart disease Father     Heart disease Sister     Cervical cancer Sister     Uterine cancer Sister     Heart disease Brother     Stroke Other         Maternal grandparent/Paternal grandparent    Heart disease Other         Maternal grandparent/Paternal grandparent    Stroke Maternal Aunt     Heart disease Maternal Aunt         Social History     Socioeconomic History    Marital status:    Tobacco Use    Smoking status: Former     Packs/day: 0.80     Years: 25.00     Additional pack years: 0.00     Total pack years: 20.00     Types: Cigarettes     Start date:      Quit date:      Years since quittin.0     Passive exposure: Never    Smokeless tobacco: Never   Vaping Use    Vaping Use: Never used   Substance and Sexual Activity    Alcohol use: Never     Comment: Does not drink    Drug use: Never    Sexual activity: Defer        Past Medical History:   Diagnosis Date    Asthma     Burning mouth syndrome     COPD (chronic obstructive pulmonary disease)     Depression     Diastolic CHF 2021    Essential hypertension 2012    GERD (gastroesophageal reflux disease)     Hyperlipidemia     Oral lesion     Paroxysmal atrial fibrillation 2021        Immunization History   Administered Date(s) Administered    COVID-19 (MODERNA) 1st,2nd,3rd Dose Monovalent 2021, 2021, 2021, 2021    Flu Vaccine Quad PF >36MO 2014    Flu Vaccine Split Quad 2005, 10/29/2008, 09/10/2010, 11/10/2011, 2014    Fluad Quad  65+ 09/14/2020    Fluzone (or Fluarix & Flulaval for VFC) >6mos 09/25/2014    Fluzone High Dose =>65 Years ([facility] ONLY) 12/01/2015, 10/24/2016, 09/26/2017, 09/28/2018, 10/11/2019    Fluzone High-Dose 65+yrs 10/09/2021, 09/27/2022, 10/26/2023    Influenza TIV (IM) 11/22/2005, 10/29/2008, 09/10/2010, 11/10/2011    Influenza, Unspecified 09/25/2014, 10/09/2021, 09/27/2022    Pneumococcal Conjugate 13-Valent (PCV13) 12/01/2015    Pneumococcal Polysaccharide (PPSV23) 01/26/2017    Shingrix 07/07/2020, 09/23/2020    Tdap 07/15/2013         Allergies   Allergen Reactions    Propafenone Other (See Comments)     Drops B/p    Cephalexin Unknown - Low Severity     Mouth sores    Erythromycin Other (See Comments)     Stroke like    Farxiga [Dapagliflozin] Other (See Comments)     Patient states she got a yeast infection    Mercury Unknown - Low Severity    Metoclopramide Other (See Comments)     Decreased blood pressure      Moxifloxacin Hcl Unknown - Low Severity    Penicillins Unknown - Low Severity          Current Outpatient Medications:     acetaminophen (TYLENOL) 500 MG tablet, Take 1 tablet by mouth., Disp: , Rfl:     albuterol sulfate  (90 Base) MCG/ACT inhaler, Inhale 2 puffs Every 4 (Four) Hours As Needed for Wheezing., Disp: 54 g, Rfl: 3    allopurinol (ZYLOPRIM) 300 MG tablet, Take 1 tablet by mouth Daily., Disp: , Rfl:     atorvastatin (LIPITOR) 80 MG tablet, Take 1 tablet by mouth Daily., Disp: , Rfl:     baclofen (LIORESAL) 10 MG tablet, Take 1 tablet by mouth 3 (Three) Times a Day., Disp: , Rfl:     budesonide-formoterol (SYMBICORT) 160-4.5 MCG/ACT inhaler, Inhale 2 puffs., Disp: , Rfl:     Cholecalciferol (VITAMIN D3 PO), Take 2,000 Units by mouth Daily., Disp: , Rfl:     citalopram (CeleXA) 20 MG tablet, Take 1 tablet by mouth Daily., Disp: , Rfl:     diazePAM (VALIUM) 2 MG tablet, Take 1 tablet by mouth Every 6 (Six) Hours As Needed (As needed for vertigo)., Disp: 10 tablet, Rfl: 0    diphenhydrAMINE  (BENADRYL) 25 MG tablet, Take 1 tablet by mouth Every 6 (Six) Hours As Needed., Disp: , Rfl:     Eliquis 5 MG tablet tablet, TAKE 1 TABLET BY MOUTH TWICE A DAY, Disp: 180 tablet, Rfl: 3    estradiol (ESTRACE) 0.1 MG/GM vaginal cream, estradiol 0.01% (0.1 mg/gram) vaginal cream, Disp: , Rfl:     flecainide (TAMBOCOR) 50 MG tablet, Take 1 tablet by mouth 2 (Two) Times a Day., Disp: , Rfl:     furosemide (LASIX) 40 MG tablet, Take 0.5 tablets by mouth., Disp: , Rfl:     gabapentin (NEURONTIN) 300 MG capsule, Take 1 capsule by mouth Daily., Disp: , Rfl:     HYDROcodone-acetaminophen (NORCO)  MG per tablet, hydrocodone 10 mg-acetaminophen 325 mg tablet, Disp: , Rfl:     metoprolol succinate XL (TOPROL-XL) 25 MG 24 hr tablet, TAKE 1/2 TABLET BY MOUTH EVERY DAY, Disp: 45 tablet, Rfl: 3    mirtazapine (REMERON) 7.5 MG half tablet, Take 2 half tablet by mouth Every Night., Disp: , Rfl:     multivitamin (THERAGRAN) tablet tablet, Take  by mouth., Disp: , Rfl:     pantoprazole (PROTONIX) 40 MG EC tablet, Take 1 tablet by mouth Daily., Disp: 90 tablet, Rfl: 2    polyethylene glycol (MIRALAX) 17 g packet, Take 17 g by mouth Daily., Disp: , Rfl:     potassium chloride (K-DUR,KLOR-CON) 20 MEQ CR tablet, potassium chloride ER 20 mEq tablet,extended release, Disp: , Rfl:     prochlorperazine (COMPAZINE) 5 MG tablet, TAKE 1 TABLET BY MOUTH EVERY 6 HOURS AS NEEDED FOR NAUSEA OR VOMITING, Disp: 30 tablet, Rfl: 1    spironolactone (ALDACTONE) 25 MG tablet, Take 1 tablet by mouth Daily., Disp: , Rfl:     Farxiga 10 MG tablet, Take 10 mg by mouth Daily. (Patient not taking: Reported on 1/18/2024), Disp: , Rfl:     meclizine (ANTIVERT) 25 MG tablet, Take 1 tablet by mouth 4 (Four) Times a Day As Needed for Dizziness. (Patient not taking: Reported on 1/18/2024), Disp: 60 tablet, Rfl: 0    ondansetron ODT (ZOFRAN-ODT) 8 MG disintegrating tablet, Place 1 tablet on the tongue Every 8 (Eight) Hours As Needed for Nausea or Vomiting.  "(Patient not taking: Reported on 1/18/2024), Disp: 12 tablet, Rfl: 0    promethazine (PHENERGAN) 25 MG tablet, , Disp: , Rfl:      Objective   Physical Exam  Constitutional:       General: She is not in acute distress.     Appearance: Normal appearance. She is obese.   HENT:      Right Ear: Hearing normal.      Left Ear: Hearing normal.      Nose: No nasal tenderness or congestion.      Mouth/Throat:      Mouth: Mucous membranes are moist. No oral lesions.   Eyes:      Extraocular Movements: Extraocular movements intact.      Pupils: Pupils are equal, round, and reactive to light.   Neck:      Thyroid: No thyroid mass or thyromegaly.   Cardiovascular:      Rate and Rhythm: Normal rate and regular rhythm.      Pulses: Normal pulses.      Heart sounds: Normal heart sounds. No murmur heard.  Pulmonary:      Effort: Pulmonary effort is normal.      Breath sounds: Normal breath sounds. No wheezing, rhonchi or rales.   Musculoskeletal:      Cervical back: Neck supple.      Right lower leg: No edema.      Left lower leg: No edema.   Lymphadenopathy:      Cervical: No cervical adenopathy.      Upper Body:      Right upper body: No axillary adenopathy.   Skin:     General: Skin is warm and dry.      Findings: No lesion or rash.   Neurological:      General: No focal deficit present.      Mental Status: She is alert and oriented to person, place, and time.   Psychiatric:         Mood and Affect: Affect normal. Mood is not anxious or depressed.         Vital Signs:   /63 (BP Location: Right arm, Patient Position: Sitting, Cuff Size: Large Adult)   Pulse 62   Temp 97.6 °F (36.4 °C) (Temporal)   Resp 16   Ht 172.7 cm (68\")   Wt 123 kg (271 lb 12.8 oz)   SpO2 95% Comment: RA  BMI 41.33 kg/m²        Result Review :   The following data was reviewed by: PRAVIN Coker on 01/18/2024:  CMP          2/17/2023    15:33 2/21/2023    12:58   CMP   Glucose 110     Glucose  120       BUN 24  30       Creatinine 1.50  " 2.02       EGFR  Am  29       EGFR 36.9     Sodium 138  140       Potassium 3.8  3.6       Chloride 96  99       Calcium 9.9  10.3       Total Protein 7.8     Albumin 4.9     Globulin 2.9     Total Bilirubin 0.7     Alkaline Phosphatase 100     AST (SGOT) 21     ALT (SGPT) 18     Albumin/Globulin Ratio 1.7     BUN/Creatinine Ratio 16.0  14.9       Anion Gap 14.4  11          Details          This result is from an external source.             CBC w/diff          7/3/2023    11:51 8/8/2023    14:06 8/22/2023    12:00   CBC w/Diff   WBC 9.69     11.90     9.81       RBC 4.45     4.35     4.36       Hemoglobin 11.4     11.0     11.5       Hematocrit 36.1     36.3     36.0       MCV 81.1     83.4     82.6       MCH 25.6     25.3     26.4       MCHC 31.6     30.3     31.9       RDW 17.3     17.7     18.0       Platelets 356     385     393       Neutrophil Rel % 56.0     66.2     67.5       Immature Granulocyte Rel % 0.4     0.6     0.6       Lymphocyte Rel % 29.1     22.0     19.0       Monocyte Rel % 12.0     9.4     10.3       Eosinophil Rel % 2.0     1.3     1.9       Basophil Rel % 0.5     0.5     0.7          Details          This result is from an external source.             Data reviewed : Radiologic studies pulmonary function test 1/11/2021 and Dr. Reynolds's last office note    Procedures        Assessment and Plan    Diagnoses and all orders for this visit:    1. Centrilobular emphysema (Primary)    2. Nicotine dependence, cigarettes, in remission  -     Cancel: CT Chest Low Dose Wo; Future  -     CT Chest Low Dose Wo; Future    3. CRISTINO (obstructive sleep apnea)    4. Chronic diastolic congestive heart failure    5. Obesity, Class III, BMI 40-49.9 (morbid obesity)    6.  Continue Symbicort as prescribed.  Rinse mouth after each use.  7.  Continue albuterol as needed.  8.  Continue CPAP at current settings and follow-up see medicine as scheduled.  9.  Continue Lasix and follow-up with cardiology as  scheduled.  10.  Encourage patient to stay as active as possible.  Recommend 30 minutes of activity.  11.  Follow-up in 4 to 5 months, sooner if needed.        Follow Up   Return in about 4 months (around 5/18/2024) for Recheck with [facility].  Patient was given instructions and counseling regarding her condition or for health maintenance advice. Please see specific information pulled into the AVS if appropriate.

## 2024-01-26 ENCOUNTER — TRANSCRIBE ORDERS (OUTPATIENT)
Dept: PHYSICAL THERAPY | Facility: CLINIC | Age: 74
End: 2024-01-26
Payer: MEDICARE

## 2024-01-26 DIAGNOSIS — R42 DISEQUILIBRIUM: Primary | ICD-10-CM

## 2024-01-31 ENCOUNTER — HOSPITAL ENCOUNTER (OUTPATIENT)
Dept: CT IMAGING | Facility: HOSPITAL | Age: 74
Discharge: HOME | End: 2024-01-31
Admitting: NURSE PRACTITIONER
Payer: MEDICARE

## 2024-01-31 DIAGNOSIS — F17.211 NICOTINE DEPENDENCE, CIGARETTES, IN REMISSION: ICD-10-CM

## 2024-01-31 PROCEDURE — 71271 CT THORAX LUNG CANCER SCR C-: CPT

## 2024-02-09 RX ORDER — TIOTROPIUM BROMIDE AND OLODATEROL 3.124; 2.736 UG/1; UG/1
2 SPRAY, METERED RESPIRATORY (INHALATION) DAILY
Qty: 12 G | OUTPATIENT
Start: 2024-02-09

## 2024-04-04 DIAGNOSIS — K21.9 GASTROESOPHAGEAL REFLUX DISEASE, UNSPECIFIED WHETHER ESOPHAGITIS PRESENT: ICD-10-CM

## 2024-04-04 RX ORDER — PANTOPRAZOLE SODIUM 40 MG/1
40 TABLET, DELAYED RELEASE ORAL DAILY
Qty: 90 TABLET | Refills: 2 | Status: SHIPPED | OUTPATIENT
Start: 2024-04-04

## 2024-04-04 NOTE — TELEPHONE ENCOUNTER
Pt is requesting pantoprazole. Order pended.   Last ov: 1/9/24  Next ov: 1/9/25  Last refill: 7/12/23

## 2024-04-15 ENCOUNTER — LAB (OUTPATIENT)
Dept: LAB | Facility: HOSPITAL | Age: 74
End: 2024-04-15
Payer: MEDICARE

## 2024-04-15 ENCOUNTER — TRANSCRIBE ORDERS (OUTPATIENT)
Dept: ADMINISTRATIVE | Facility: HOSPITAL | Age: 74
End: 2024-04-15
Payer: MEDICARE

## 2024-04-15 DIAGNOSIS — I50.32 CHRONIC DIASTOLIC HEART FAILURE: Primary | ICD-10-CM

## 2024-04-15 DIAGNOSIS — I50.32 CHRONIC DIASTOLIC HEART FAILURE: ICD-10-CM

## 2024-04-15 LAB
ANION GAP SERPL CALCULATED.3IONS-SCNC: 12.3 MMOL/L (ref 5–15)
BUN SERPL-MCNC: 26 MG/DL (ref 8–23)
BUN/CREAT SERPL: 11.4 (ref 7–25)
CALCIUM SPEC-SCNC: 9.7 MG/DL (ref 8.6–10.5)
CHLORIDE SERPL-SCNC: 101 MMOL/L (ref 98–107)
CO2 SERPL-SCNC: 26.7 MMOL/L (ref 22–29)
CREAT SERPL-MCNC: 2.28 MG/DL (ref 0.57–1)
EGFRCR SERPLBLD CKD-EPI 2021: 22.2 ML/MIN/1.73
GLUCOSE SERPL-MCNC: 117 MG/DL (ref 65–99)
POTASSIUM SERPL-SCNC: 5.2 MMOL/L (ref 3.5–5.2)
SODIUM SERPL-SCNC: 140 MMOL/L (ref 136–145)

## 2024-04-15 PROCEDURE — 80048 BASIC METABOLIC PNL TOTAL CA: CPT

## 2024-04-15 PROCEDURE — 36415 COLL VENOUS BLD VENIPUNCTURE: CPT

## 2024-04-15 RX ORDER — PROCHLORPERAZINE MALEATE 5 MG/1
5 TABLET ORAL EVERY 6 HOURS PRN
Qty: 30 TABLET | Refills: 1 | Status: SHIPPED | OUTPATIENT
Start: 2024-04-15

## 2024-04-15 NOTE — TELEPHONE ENCOUNTER
Patient requesting a refill of Compazine, order pended.  Last o/v-1/9/24  Last refill-12/11/23  Next o/v-1/9/25

## 2024-04-16 ENCOUNTER — OFFICE VISIT (OUTPATIENT)
Dept: PULMONOLOGY | Facility: CLINIC | Age: 74
End: 2024-04-16
Payer: MEDICARE

## 2024-04-16 VITALS
DIASTOLIC BLOOD PRESSURE: 60 MMHG | OXYGEN SATURATION: 97 % | BODY MASS INDEX: 41.49 KG/M2 | SYSTOLIC BLOOD PRESSURE: 109 MMHG | TEMPERATURE: 97.6 F | HEART RATE: 67 BPM | WEIGHT: 273.8 LBS | RESPIRATION RATE: 18 BRPM | HEIGHT: 68 IN

## 2024-04-16 DIAGNOSIS — F17.211 NICOTINE DEPENDENCE, CIGARETTES, IN REMISSION: ICD-10-CM

## 2024-04-16 DIAGNOSIS — E66.01 OBESITY, CLASS III, BMI 40-49.9 (MORBID OBESITY): ICD-10-CM

## 2024-04-16 DIAGNOSIS — I50.30 HEART FAILURE WITH PRESERVED EJECTION FRACTION, UNSPECIFIED HF CHRONICITY: Chronic | ICD-10-CM

## 2024-04-16 DIAGNOSIS — J43.2 CENTRILOBULAR EMPHYSEMA: Primary | Chronic | ICD-10-CM

## 2024-04-16 DIAGNOSIS — R06.09 DYSPNEA ON EXERTION: ICD-10-CM

## 2024-04-16 DIAGNOSIS — G47.19 EXCESSIVE DAYTIME SLEEPINESS: ICD-10-CM

## 2024-04-16 DIAGNOSIS — G47.33 OSA (OBSTRUCTIVE SLEEP APNEA): Chronic | ICD-10-CM

## 2024-04-16 RX ORDER — LEVALBUTEROL TARTRATE 45 UG/1
2 AEROSOL, METERED ORAL 4 TIMES DAILY PRN
Qty: 1 EACH | Refills: 5 | Status: SHIPPED | OUTPATIENT
Start: 2024-04-16

## 2024-04-16 RX ORDER — BUDESONIDE AND FORMOTEROL FUMARATE DIHYDRATE 160; 4.5 UG/1; UG/1
2 AEROSOL RESPIRATORY (INHALATION)
Qty: 1 EACH | Refills: 11 | Status: SHIPPED | OUTPATIENT
Start: 2024-04-16

## 2024-04-16 RX ORDER — SULFAMETHOXAZOLE AND TRIMETHOPRIM 800; 160 MG/1; MG/1
1 TABLET ORAL EVERY 12 HOURS SCHEDULED
COMMUNITY
Start: 2024-04-09

## 2024-04-16 RX ORDER — POTASSIUM CHLORIDE 1500 MG/1
1 TABLET, EXTENDED RELEASE ORAL DAILY
COMMUNITY
Start: 2024-03-28 | End: 2024-04-16 | Stop reason: SDUPTHER

## 2024-04-16 NOTE — PROGRESS NOTES
"Primary Care Provider  Wilder Morales MD     Referring Provider  No ref. provider found     Chief Complaint  Emphysema, Shortness of Breath (With exertion, more than normal ), Cough (Dry cough ), and Acute Visit    Subjective          Nica Traylor presents to [facility]  History of Present Illness  Nica Traylor is a 73 y.o. female patient of Dr. Reynolds here for management of centrilobular emphysema, tobacco abuse of cigarettes in remission, obstructive sleep apnea under the care of sleep medicine, congestive diastolic heart failure and dyspnea on exertion.     She denies any current fevers or chills.  Her shortness of breath is moderate in severity, worse with exertion and improved with rest.  She states that this is more than her normal amount of shortness of breath.  She states that her shortness of breath has been progressive over the past 3-6 months.  She also states tshe has a dry cough.  Patient does have chronic diastolic heart failure and her metoprolol was recently increased to 25 mg daily in February 2024.  She was recently started on Entresto.  She states that her cardiologist believes it is her heart due to not being able to wear her CPAP.  She states that she has not worn her CPAP in at least 2 years.  She states that she has not been wearing it due to it causing nosebleeds.  I have discussed with her the risks involved from untreated sleep apnea such as atrial fibrillation, pulmonary hypertension, inability to lose weight and death.  Patient verbalizes understanding.  She states that her cardiologist was wanting to get her set up for the inspire device but her BMI is too high.  Patient states that she is unable to take weight loss injections due to already having \"slow gut\".  She states that she believes that she also cannot take weight loss pills due to the effect it may have on her heart.  She continues to use Symbicort as " prescribed.  States that she has not been using her albuterol due to it making her feel jittery.  She is open to trying Xopenex.  After reviewing patient's chest CT with her and previous pulmonary function test, I agree that the shortness of breath is related to her heart.  She also complains of continued fatigue and I have explained that this is a result of untreated sleep apnea as well.  Patient verbalizes understanding.  Patient states that she will reach out to her cardiologist about her metoprolol and see if there is alternative medication that they would like to try.  I encourage patient not to stop this medication abruptly.  Otherwise, she has no additional respiratory concerns at this time.  She is able to perform her ADLs without difficulty but states that walking long distances require pacing or frequent breaks.  She is up-to-date with her COVID, flu and pneumonia vaccines.     Her history of smoking is   Tobacco Use: Medium Risk (4/16/2024)    Patient History     Smoking Tobacco Use: Former     Smokeless Tobacco Use: Never     Passive Exposure: Never   .    Review of Systems   Constitutional:  Positive for fatigue. Negative for chills, fever, unexpected weight gain and unexpected weight loss.   HENT:  Congestion: Nasal.    Respiratory:  Positive for cough and shortness of breath. Negative for apnea and wheezing.         Negative for Hemoptysis     Cardiovascular:  Negative for chest pain, palpitations and leg swelling.   Skin:         Negative for cyanosis      Sleep: Negative for Excessive daytime sleepiness  Negative for morning headaches  Negative for Snoring    Family History   Problem Relation Age of Onset    Stroke Mother     Heart disease Mother     Diabetes Mother     Pancreatic cancer Mother     Heart disease Father     Heart disease Sister     Cervical cancer Sister     Uterine cancer Sister     Heart disease Brother     Stroke Other         Maternal grandparent/Paternal grandparent    Heart  disease Other         Maternal grandparent/Paternal grandparent    Stroke Maternal Aunt     Heart disease Maternal Aunt         Social History     Socioeconomic History    Marital status:    Tobacco Use    Smoking status: Former     Current packs/day: 0.00     Average packs/day: 0.8 packs/day for 25.0 years (20.0 ttl pk-yrs)     Types: Cigarettes     Start date:      Quit date:      Years since quittin.2     Passive exposure: Never    Smokeless tobacco: Never   Vaping Use    Vaping status: Never Used   Substance and Sexual Activity    Alcohol use: Never     Comment: Does not drink    Drug use: Never    Sexual activity: Defer        Past Medical History:   Diagnosis Date    Asthma     Burning mouth syndrome     COPD (chronic obstructive pulmonary disease)     Depression     Diastolic CHF 2021    Essential hypertension 2012    GERD (gastroesophageal reflux disease)     Hyperlipidemia     Oral lesion     Paroxysmal atrial fibrillation 2021        Immunization History   Administered Date(s) Administered    COVID-19 (MODERNA) 1st,2nd,3rd Dose Monovalent 2021, 2021, 2021, 2021    Flu Vaccine Quad PF >36MO 2014    Flu Vaccine Split Quad 2005, 10/29/2008, 09/10/2010, 11/10/2011, 2014    Fluad Quad 65+ 2020    Fluzone (or Fluarix & Flulaval for VFC) >6mos 2014    Fluzone High Dose =>65 Years ([employer] ONLY) 2015, 10/24/2016, 2017, 2018, 10/11/2019    Fluzone High-Dose 65+yrs 10/09/2021, 2022, 10/26/2023    Influenza TIV (IM) 2005, 10/29/2008, 09/10/2010, 11/10/2011    Influenza, Unspecified 2014, 10/09/2021, 2022    Pneumococcal Conjugate 13-Valent (PCV13) 2015    Pneumococcal Polysaccharide (PPSV23) 2017    Shingrix 2020, 2020    Tdap 07/15/2013         Allergies   Allergen Reactions    Propafenone Other (See Comments)     Drops B/p    Cephalexin Unknown - Low Severity      Mouth sores    Erythromycin Other (See Comments)     Stroke like    Farxiga [Dapagliflozin] Other (See Comments)     Patient states she got a yeast infection    Mercury Unknown - Low Severity    Metoclopramide Other (See Comments)     Decreased blood pressure      Moxifloxacin Hcl Unknown - Low Severity    Penicillins Unknown - Low Severity          Current Outpatient Medications:     acetaminophen (TYLENOL) 500 MG tablet, Take 1 tablet by mouth., Disp: , Rfl:     albuterol sulfate  (90 Base) MCG/ACT inhaler, Inhale 2 puffs Every 4 (Four) Hours As Needed for Wheezing., Disp: 54 g, Rfl: 3    allopurinol (ZYLOPRIM) 300 MG tablet, Take 1 tablet by mouth Daily., Disp: , Rfl:     atorvastatin (LIPITOR) 80 MG tablet, Take 1 tablet by mouth Daily., Disp: , Rfl:     baclofen (LIORESAL) 10 MG tablet, Take 1 tablet by mouth 3 (Three) Times a Day., Disp: , Rfl:     budesonide-formoterol (SYMBICORT) 160-4.5 MCG/ACT inhaler, Inhale 2 puffs 2 (Two) Times a Day., Disp: 1 each, Rfl: 11    Cholecalciferol (VITAMIN D3 PO), Take 2,000 Units by mouth Daily., Disp: , Rfl:     citalopram (CeleXA) 20 MG tablet, Take 1 tablet by mouth Daily., Disp: , Rfl:     diazePAM (VALIUM) 2 MG tablet, Take 1 tablet by mouth Every 6 (Six) Hours As Needed (As needed for vertigo)., Disp: 10 tablet, Rfl: 0    diphenhydrAMINE (BENADRYL) 25 MG tablet, Take 1 tablet by mouth Every 6 (Six) Hours As Needed., Disp: , Rfl:     Eliquis 5 MG tablet tablet, TAKE 1 TABLET BY MOUTH TWICE A DAY, Disp: 180 tablet, Rfl: 3    estradiol (ESTRACE) 0.1 MG/GM vaginal cream, estradiol 0.01% (0.1 mg/gram) vaginal cream, Disp: , Rfl:     flecainide (TAMBOCOR) 50 MG tablet, Take 1 tablet by mouth 2 (Two) Times a Day., Disp: , Rfl:     furosemide (LASIX) 40 MG tablet, Take 0.5 tablets by mouth., Disp: , Rfl:     gabapentin (NEURONTIN) 300 MG capsule, Take 1 capsule by mouth Daily., Disp: , Rfl:     HYDROcodone-acetaminophen (NORCO)  MG per tablet, hydrocodone 10  mg-acetaminophen 325 mg tablet, Disp: , Rfl:     metoprolol succinate XL (TOPROL-XL) 25 MG 24 hr tablet, TAKE 1/2 TABLET BY MOUTH EVERY DAY, Disp: 45 tablet, Rfl: 3    mirtazapine (REMERON) 7.5 MG half tablet, Take 2 half tablet by mouth Every Night., Disp: , Rfl:     multivitamin (THERAGRAN) tablet tablet, Take  by mouth., Disp: , Rfl:     pantoprazole (PROTONIX) 40 MG EC tablet, TAKE 1 TABLET BY MOUTH DAILY, Disp: 90 tablet, Rfl: 2    polyethylene glycol (MIRALAX) 17 g packet, Take 17 g by mouth Daily., Disp: , Rfl:     potassium chloride (K-DUR,KLOR-CON) 20 MEQ CR tablet, potassium chloride ER 20 mEq tablet,extended release, Disp: , Rfl:     prochlorperazine (COMPAZINE) 5 MG tablet, TAKE 1 TABLET BY MOUTH EVERY 6 HOURS AS NEEDED FOR NAUSEA OR VOMITING, Disp: 30 tablet, Rfl: 1    sacubitril-valsartan (ENTRESTO) 24-26 MG tablet, Take 1 tablet by mouth., Disp: , Rfl:     spironolactone (ALDACTONE) 25 MG tablet, Take 1 tablet by mouth Daily., Disp: , Rfl:     sulfamethoxazole-trimethoprim (BACTRIM DS,SEPTRA DS) 800-160 MG per tablet, Take 1 tablet by mouth Every 12 (Twelve) Hours., Disp: , Rfl:     Farxiga 10 MG tablet, Take 10 mg by mouth Daily. (Patient not taking: Reported on 1/18/2024), Disp: , Rfl:     levalbuterol (XOPENEX HFA) 45 MCG/ACT inhaler, Inhale 2 puffs 4 (Four) Times a Day As Needed for Wheezing., Disp: 1 each, Rfl: 5    meclizine (ANTIVERT) 25 MG tablet, Take 1 tablet by mouth 4 (Four) Times a Day As Needed for Dizziness. (Patient not taking: Reported on 1/18/2024), Disp: 60 tablet, Rfl: 0    ondansetron ODT (ZOFRAN-ODT) 8 MG disintegrating tablet, Place 1 tablet on the tongue Every 8 (Eight) Hours As Needed for Nausea or Vomiting. (Patient not taking: Reported on 1/18/2024), Disp: 12 tablet, Rfl: 0    promethazine (PHENERGAN) 25 MG tablet, , Disp: , Rfl:      Objective   Physical Exam  Constitutional:       General: She is not in acute distress.     Appearance: Normal appearance. She is overweight.  "  HENT:      Right Ear: Hearing normal.      Left Ear: Hearing normal.      Nose: No nasal tenderness or congestion.      Mouth/Throat:      Mouth: Mucous membranes are moist. No oral lesions.   Eyes:      Extraocular Movements: Extraocular movements intact.      Pupils: Pupils are equal, round, and reactive to light.   Neck:      Thyroid: No thyroid mass or thyromegaly.   Cardiovascular:      Rate and Rhythm: Normal rate and regular rhythm.      Pulses: Normal pulses.      Heart sounds: Normal heart sounds. No murmur heard.  Pulmonary:      Effort: Pulmonary effort is normal.      Breath sounds: Decreased breath sounds present. No wheezing, rhonchi or rales.   Musculoskeletal:      Cervical back: Neck supple.      Right lower leg: No edema.      Left lower leg: No edema.   Lymphadenopathy:      Cervical: No cervical adenopathy.      Upper Body:      Right upper body: No axillary adenopathy.   Skin:     General: Skin is warm and dry.      Findings: No lesion or rash.   Neurological:      General: No focal deficit present.      Mental Status: She is alert and oriented to person, place, and time.   Psychiatric:         Mood and Affect: Affect normal. Mood is not anxious or depressed.         Vital Signs:   /60 (BP Location: Right arm, Patient Position: Sitting, Cuff Size: Large Adult)   Pulse 67   Temp 97.6 °F (36.4 °C) (Oral)   Resp 18   Ht 172.7 cm (68\")   Wt 124 kg (273 lb 12.8 oz)   SpO2 97% Comment: RA  BMI 41.63 kg/m²        Result Review :   The following data was reviewed by: PRAVIN Coker on 04/16/2024:  CMP          4/15/2024    13:19   CMP   Glucose 117    BUN 26    Creatinine 2.28    EGFR 22.2    Sodium 140    Potassium 5.2    Chloride 101    Calcium 9.7    BUN/Creatinine Ratio 11.4    Anion Gap 12.3      CBC w/diff          7/3/2023    11:51 8/8/2023    14:06 8/22/2023    12:00   CBC w/Diff   WBC 9.69     11.90     9.81       RBC 4.45     4.35     4.36       Hemoglobin 11.4     11.0    "  11.5       Hematocrit 36.1     36.3     36.0       MCV 81.1     83.4     82.6       MCH 25.6     25.3     26.4       MCHC 31.6     30.3     31.9       RDW 17.3     17.7     18.0       Platelets 356     385     393       Neutrophil Rel % 56.0     66.2     67.5       Immature Granulocyte Rel % 0.4     0.6     0.6       Lymphocyte Rel % 29.1     22.0     19.0       Monocyte Rel % 12.0     9.4     10.3       Eosinophil Rel % 2.0     1.3     1.9       Basophil Rel % 0.5     0.5     0.7          Details          This result is from an external source.             Data reviewed : Radiologic studies chest CT 1/31/2024, pulmonary function test 1/11/2021 and PRAVIN Castillo (cardiology) last office note 2/27/2024 and my last office note    Procedures        Assessment and Plan    Diagnoses and all orders for this visit:    1. Centrilobular emphysema (Primary)  Comments:  Continue Symbicort  Orders:  -     budesonide-formoterol (SYMBICORT) 160-4.5 MCG/ACT inhaler; Inhale 2 puffs 2 (Two) Times a Day.  Dispense: 1 each; Refill: 11  -     levalbuterol (XOPENEX HFA) 45 MCG/ACT inhaler; Inhale 2 puffs 4 (Four) Times a Day As Needed for Wheezing.  Dispense: 1 each; Refill: 5    2. CRISTINO (obstructive sleep apnea)  Comments:  Patient unable to wear CPAP due to nosebleeds.    3. Dyspnea on exertion  Comments:  Continue albuterol as needed    4. Heart failure with preserved ejection fraction, unspecified HF chronicity  Comments:  Follow-up with cardiology as scheduled    5. Nicotine dependence, cigarettes, in remission  Comments:  low dose CT due in January 2025  Orders:  -     CT Chest Low Dose Wo; Future    6. Obesity, Class III, BMI 40-49.9 (morbid obesity)  Comments:  Encourage patient try to stay as active as possible.  Recommend 30 minutes for activity.  Recommend speaking with primary care about weight loss options    7. Excessive daytime sleepiness  Comments:  Explained that this is a result of untreated sleep apnea    .   Follow-up in 4 months, sooner if needed    I spent 43 minutes caring for Nica on this date of service. This time includes time spent by me in the following activities:preparing for the visit, reviewing tests, obtaining and/or reviewing a separately obtained history, performing a medically appropriate examination and/or evaluation , counseling and educating the patient/family/caregiver, ordering medications, tests, or procedures, and documenting information in the medical record    Follow Up   Return in about 4 months (around 8/16/2024) for Recheck with Wai.  Patient was given instructions and counseling regarding her condition or for health maintenance advice. Please see specific information pulled into the AVS if appropriate.

## 2024-04-16 NOTE — PATIENT INSTRUCTIONS
Obesity, Adult  Obesity is the condition of having too much total body fat. Being overweight or obese means that your weight is greater than what is considered healthy for your body size. Obesity is determined by a measurement called BMI (body mass index). BMI is an estimate of body fat and is calculated from height and weight. For adults, a BMI of 30 or higher is considered obese.  Obesity can lead to other health concerns and major illnesses, including:  Stroke.  Coronary artery disease (CAD).  Type 2 diabetes.  Some types of cancer, including cancers of the colon, breast, uterus, and gallbladder.  High blood pressure (hypertension).  High cholesterol.  Gallbladder stones.  Obesity can also contribute to:  Osteoarthritis.  Sleep apnea.  Infertility problems.  What are the causes?  Common causes of this condition include:  Eating daily meals that are high in calories, sugar, and fat.  Drinking high amounts of sugar-sweetened beverages, such as soft drinks.  Being born with genes that may make you more likely to become obese.  Having a medical condition that causes obesity, including:  Hypothyroidism.  Polycystic ovarian syndrome (PCOS).  Binge-eating disorder.  Cushing syndrome.  Taking certain medicines, such as steroids, antidepressants, and seizure medicines.  Not being physically active (sedentary lifestyle).  Not getting enough sleep.  What increases the risk?  The following factors may make you more likely to develop this condition:  Having a family history of obesity.  Living in an area with limited access to:  [place], recreation centers, or sidewalks.  Healthy food choices, such as grocery stores and [employer] markets.  What are the signs or symptoms?  The main sign of this condition is having too much body fat.  How is this diagnosed?  This condition is diagnosed based on:  Your BMI. If you are an adult with a BMI of 30 or higher, you are considered obese.  Your waist circumference. This measures the  distance around your waistline.  Your skinfold thickness. Your health care provider may gently pinch a fold of your skin and measure it.  You may have other tests to check for underlying conditions.  How is this treated?  Treatment for this condition often includes changing your lifestyle. Treatment may include some or all of the following:  Dietary changes. This may include developing a healthy meal plan.  Regular physical activity. This may include activity that causes your heart to beat faster (aerobic exercise) and strength training. Work with your health care provider to design an exercise program that works for you.  Medicine to help you lose weight if you are unable to lose one pound a week after six weeks of healthy eating and more physical activity.  Treating conditions that cause the obesity (underlying conditions).  Surgery. Surgical options may include gastric banding and gastric bypass. Surgery may be done if:  Other treatments have not helped to improve your condition.  You have a BMI of 40 or higher.  You have life-threatening health problems related to obesity.  Follow these instructions at home:  Eating and drinking    Follow recommendations from your health care provider about what you eat and drink. Your health care provider may advise you to:  Limit fast food, sweets, and processed snack foods.  Choose low-fat options, such as low-fat milk instead of whole milk.  Eat five or more servings of fruits or vegetables every day.  Choose healthy foods when you eat out.  Keep low-fat snacks available.  Limit sugary drinks, such as soda, fruit juice, sweetened iced tea, and flavored milk.  Drink enough water to keep your urine pale yellow.  Do not follow a fad diet. Fad diets can be unhealthy and even dangerous.  Other healthful choices include:  Eat at home more often. This gives you more control over what you eat.  Learn to read food labels. This will help you understand how much food is considered one  serving.  Learn what a healthy serving size is.  Physical activity  Exercise regularly, as told by your health care provider.  Most adults should get up to 150 minutes of moderate-intensity exercise every week.  Ask your health care provider what types of exercise are safe for you and how often you should exercise.  Warm up and stretch before being active.  Cool down and stretch after being active.  Rest between periods of activity.  Lifestyle  Work with your health care provider and a dietitian to set a weight-loss goal that is healthy and reasonable for you.  Limit your screen time.  Find ways to reward yourself that do not involve food.  Do not drink alcohol if:  Your health care provider tells you not to drink.  You are pregnant, may be pregnant, or are planning to become pregnant.  If you drink alcohol:  Limit how much you have to:  0-1 drink a day for women.  0-2 drinks a day for men.  Know how much alcohol is in your drink. In the [place] one drink equals one 12 oz bottle of beer (355 mL), one 5 oz glass of wine (148 mL), or one 1½ oz glass of hard liquor (44 mL).  General instructions  Keep a weight-loss journal to keep track of the food you eat and how much exercise you get.  Take over-the-counter and prescription medicines only as told by your health care provider.  Take vitamins and supplements only as told by your health care provider.  Consider joining a support group. Your health care provider may be able to recommend a support group.  Pay attention to your mental health as obesity can lead to depression or self esteem issues.  Keep all follow-up visits. This is important.  Contact a health care provider if:  You are unable to meet your weight-loss goal after six weeks of dietary and lifestyle changes.  You have trouble breathing.  Summary  Obesity is the condition of having too much total body fat.  Being overweight or obese means that your weight is greater than what is considered healthy for your body  size.  Work with your health care provider and a dietitian to set a weight-loss goal that is healthy and reasonable for you.  Exercise regularly, as told by your health care provider. Ask your health care provider what types of exercise are safe for you and how often you should exercise.  This information is not intended to replace advice given to you by your health care provider. Make sure you discuss any questions you have with your health care provider.  Document Revised: 07/26/2022 Document Reviewed: 07/26/2022  Elsevier Patient Education © 2023 [employer]  Sleep Apnea  Sleep apnea is a condition in which breathing pauses or becomes shallow during sleep. People with sleep apnea usually snore loudly. They may have times when they gasp and stop breathing for 10 seconds or more during sleep. This may happen many times during the night.  Sleep apnea disrupts your sleep and keeps your body from getting the rest that it needs. This condition can increase your risk of certain health problems, including:  Heart attack.  Stroke.  Obesity.  Type 2 diabetes.  Heart failure.  Irregular heartbeat.  High blood pressure.  The goal of treatment is to help you breathe normally again.  What are the causes?    The most common cause of sleep apnea is a collapsed or blocked airway.  There are three kinds of sleep apnea:  Obstructive sleep apnea. This kind is caused by a blocked or collapsed airway.  Central sleep apnea. This kind happens when the part of the brain that controls breathing does not send the correct signals to the muscles that control breathing.  Mixed sleep apnea. This is a combination of obstructive and central sleep apnea.  What increases the risk?  You are more likely to develop this condition if you:  Are overweight.  Smoke.  Have a smaller than normal airway.  Are older.  Are male.  Drink alcohol.  Take sedatives or tranquilizers.  Have a family history of sleep apnea.  Have a tongue or tonsils that are larger  than normal.  What are the signs or symptoms?  Symptoms of this condition include:  Trouble staying asleep.  Loud snoring.  Morning headaches.  Waking up gasping.  Dry mouth or sore throat in the morning.  Daytime sleepiness and tiredness.  If you have daytime fatigue because of sleep apnea, you may be more likely to have:  Trouble concentrating.  Forgetfulness.  Irritability or mood swings.  Personality changes.  Feelings of depression.  Sexual dysfunction. This may include loss of interest if you are female, or erectile dysfunction if you are male.  How is this diagnosed?  This condition may be diagnosed with:  A medical history.  A physical exam.  A series of tests that are done while you are sleeping (sleep study). These tests are usually done in a sleep lab, but they may also be done at home.  How is this treated?  Treatment for this condition aims to restore normal breathing and to ease symptoms during sleep. It may involve managing health issues that can affect breathing, such as high blood pressure or obesity. Treatment may include:  Sleeping on your side.  Using a decongestant if you have nasal congestion.  Avoiding the use of depressants, including alcohol, sedatives, and narcotics.  Losing weight if you are overweight.  Making changes to your diet.  Quitting smoking.  Using a device to open your airway while you sleep, such as:  An oral appliance. This is a custom-made mouthpiece that shifts your lower jaw forward.  A continuous positive airway pressure (CPAP) device. This device blows air through a mask when you breathe out (exhale).  A nasal expiratory positive airway pressure (EPAP) device. This device has valves that you put into each nostril.  A bi-level positive airway pressure (BIPAP) device. This device blows air through a mask when you breathe in (inhale) and breathe out (exhale).  Having surgery if other treatments do not work. During surgery, excess tissue is removed to create a wider  airway.  Follow these instructions at home:  Lifestyle  Make any lifestyle changes that your health care provider recommends.  Eat a healthy, well-balanced diet.  Take steps to lose weight if you are overweight.  Avoid using depressants, including alcohol, sedatives, and narcotics.  Do not use any products that contain nicotine or tobacco. These products include cigarettes, chewing tobacco, and vaping devices, such as e-cigarettes. If you need help quitting, ask your health care provider.  General instructions  Take over-the-counter and prescription medicines only as told by your health care provider.  If you were given a device to open your airway while you sleep, use it only as told by your health care provider.  If you are having surgery, make sure to tell your health care provider you have sleep apnea. You may need to bring your device with you.  Keep all follow-up visits. This is important.  Contact a health care provider if:  The device that you received to open your airway during sleep is uncomfortable or does not seem to be working.  Your symptoms do not improve.  Your symptoms get worse.  Get help right away if:  You develop:  Chest pain.  Shortness of breath.  Discomfort in your back, arms, or stomach.  You have:  Trouble speaking.  Weakness on one side of your body.  Drooping in your face.  These symptoms may represent a serious problem that is an emergency. Do not wait to see if the symptoms will go away. Get medical help right away. Call your local emergency services (911 in the [place] Do not drive yourself to the hospital.  Summary  Sleep apnea is a condition in which breathing pauses or becomes shallow during sleep.  The most common cause is a collapsed or blocked airway.  The goal of treatment is to restore normal breathing and to ease symptoms during sleep.  This information is not intended to replace advice given to you by your health care provider. Make sure you discuss any questions you have with  your health care provider.  Document Revised: 07/27/2022 Document Reviewed: 11/26/2021  Elsevier Patient Education © 2023 [employer] Inc[employer]

## 2024-04-22 ENCOUNTER — TELEPHONE (OUTPATIENT)
Dept: PULMONOLOGY | Facility: CLINIC | Age: 74
End: 2024-04-22

## 2024-04-22 NOTE — TELEPHONE ENCOUNTER
Hub staff attempted to follow warm transfer process and was unsuccessful     Caller: Nica Traylor    Relationship to patient: Self    Best call back number: [phone]     Patient is needing: PATIENT DOES NOT LIKE budesonide-formoterol (SYMBICORT) 160-4.5 MCG/ACT inhaler  AND WANTS TO TAKE SYMBICORT ONLY . ASKED IF WE COULD SEND A SCRIPT TO HER [id number] - [place], [place] - [address] AT [facility] [phone] [facility] [phone]  [phone] [address] [place] [address]   WOULD LIKE A CALL BACK ONCE SCRIPT HAS BEEN SENT

## 2024-04-22 NOTE — TELEPHONE ENCOUNTER
Spoke to patient, she states she has been using Stiolto and not Symbicort. Per office note on 9/2023, she started Symbicort and has been on this medication UTD. Patient states she thought they were the same medication, but prefers Stiolto. Please D/C Symbicort and send Stiotlo to [facility]. Thanks.

## 2024-04-23 ENCOUNTER — LAB (OUTPATIENT)
Dept: LAB | Facility: HOSPITAL | Age: 74
End: 2024-04-23
Payer: MEDICARE

## 2024-04-23 ENCOUNTER — TRANSCRIBE ORDERS (OUTPATIENT)
Dept: LAB | Facility: HOSPITAL | Age: 74
End: 2024-04-23
Payer: MEDICARE

## 2024-04-23 DIAGNOSIS — N18.32 CHRONIC KIDNEY DISEASE (CKD) STAGE G3B/A1, MODERATELY DECREASED GLOMERULAR FILTRATION RATE (GFR) BETWEEN 30-44 ML/MIN/1.73 SQUARE METER AND ALBUMINURIA CREATININE RATIO LESS THAN 30 MG/G (CMS/H*: ICD-10-CM

## 2024-04-23 DIAGNOSIS — N18.32 CHRONIC KIDNEY DISEASE (CKD) STAGE G3B/A1, MODERATELY DECREASED GLOMERULAR FILTRATION RATE (GFR) BETWEEN 30-44 ML/MIN/1.73 SQUARE METER AND ALBUMINURIA CREATININE RATIO LESS THAN 30 MG/G (CMS/H*: Primary | ICD-10-CM

## 2024-04-23 DIAGNOSIS — J43.2 CENTRILOBULAR EMPHYSEMA: Primary | ICD-10-CM

## 2024-04-23 LAB
ANION GAP SERPL CALCULATED.3IONS-SCNC: 12.8 MMOL/L (ref 5–15)
BACTERIA UR QL AUTO: ABNORMAL /HPF
BASOPHILS # BLD AUTO: 0.05 10*3/MM3 (ref 0–0.2)
BASOPHILS NFR BLD AUTO: 0.5 % (ref 0–1.5)
BILIRUB UR QL STRIP: NEGATIVE
BUN SERPL-MCNC: 22 MG/DL (ref 8–23)
BUN/CREAT SERPL: 13.6 (ref 7–25)
CALCIUM SPEC-SCNC: 9.7 MG/DL (ref 8.6–10.5)
CHLORIDE SERPL-SCNC: 102 MMOL/L (ref 98–107)
CLARITY UR: CLEAR
CO2 SERPL-SCNC: 26.2 MMOL/L (ref 22–29)
COLOR UR: ABNORMAL
CREAT SERPL-MCNC: 1.62 MG/DL (ref 0.57–1)
CREAT UR-MCNC: 200.5 MG/DL
DEPRECATED RDW RBC AUTO: 52.8 FL (ref 37–54)
EGFRCR SERPLBLD CKD-EPI 2021: 33.4 ML/MIN/1.73
EOSINOPHIL # BLD AUTO: 0.19 10*3/MM3 (ref 0–0.4)
EOSINOPHIL NFR BLD AUTO: 1.9 % (ref 0.3–6.2)
ERYTHROCYTE [DISTWIDTH] IN BLOOD BY AUTOMATED COUNT: 18.2 % (ref 12.3–15.4)
GLUCOSE SERPL-MCNC: 105 MG/DL (ref 65–99)
GLUCOSE UR STRIP-MCNC: NEGATIVE MG/DL
HCT VFR BLD AUTO: 36.3 % (ref 34–46.6)
HGB BLD-MCNC: 11.7 G/DL (ref 12–15.9)
HGB UR QL STRIP.AUTO: ABNORMAL
HYALINE CASTS UR QL AUTO: ABNORMAL /LPF
IMM GRANULOCYTES # BLD AUTO: 0.04 10*3/MM3 (ref 0–0.05)
IMM GRANULOCYTES NFR BLD AUTO: 0.4 % (ref 0–0.5)
KETONES UR QL STRIP: NEGATIVE
LEUKOCYTE ESTERASE UR QL STRIP.AUTO: ABNORMAL
LYMPHOCYTES # BLD AUTO: 2.45 10*3/MM3 (ref 0.7–3.1)
LYMPHOCYTES NFR BLD AUTO: 25.1 % (ref 19.6–45.3)
MCH RBC QN AUTO: 26.1 PG (ref 26.6–33)
MCHC RBC AUTO-ENTMCNC: 32.2 G/DL (ref 31.5–35.7)
MCV RBC AUTO: 81 FL (ref 79–97)
MONOCYTES # BLD AUTO: 0.84 10*3/MM3 (ref 0.1–0.9)
MONOCYTES NFR BLD AUTO: 8.6 % (ref 5–12)
NEUTROPHILS NFR BLD AUTO: 6.19 10*3/MM3 (ref 1.7–7)
NEUTROPHILS NFR BLD AUTO: 63.5 % (ref 42.7–76)
NITRITE UR QL STRIP: NEGATIVE
NRBC BLD AUTO-RTO: 0 /100 WBC (ref 0–0.2)
PH UR STRIP.AUTO: 5.5 [PH] (ref 5–8)
PHOSPHATE SERPL-MCNC: 3.2 MG/DL (ref 2.5–4.5)
PLATELET # BLD AUTO: 369 10*3/MM3 (ref 140–450)
PMV BLD AUTO: 9.9 FL (ref 6–12)
POTASSIUM SERPL-SCNC: 4.4 MMOL/L (ref 3.5–5.2)
PROT ?TM UR-MCNC: 24.4 MG/DL
PROT UR QL STRIP: ABNORMAL
PROT/CREAT UR: 0.12 MG/G{CREAT}
PTH-INTACT SERPL-MCNC: 68.2 PG/ML (ref 15–65)
RBC # BLD AUTO: 4.48 10*6/MM3 (ref 3.77–5.28)
RBC # UR STRIP: ABNORMAL /HPF
REF LAB TEST METHOD: ABNORMAL
SODIUM SERPL-SCNC: 141 MMOL/L (ref 136–145)
SP GR UR STRIP: 1.02 (ref 1–1.03)
SQUAMOUS #/AREA URNS HPF: ABNORMAL /HPF
UROBILINOGEN UR QL STRIP: ABNORMAL
WBC # UR STRIP: ABNORMAL /HPF
WBC NRBC COR # BLD AUTO: 9.76 10*3/MM3 (ref 3.4–10.8)

## 2024-04-23 PROCEDURE — 84156 ASSAY OF PROTEIN URINE: CPT

## 2024-04-23 PROCEDURE — 82570 ASSAY OF URINE CREATININE: CPT

## 2024-04-23 PROCEDURE — 80048 BASIC METABOLIC PNL TOTAL CA: CPT

## 2024-04-23 PROCEDURE — 81001 URINALYSIS AUTO W/SCOPE: CPT

## 2024-04-23 PROCEDURE — 36415 COLL VENOUS BLD VENIPUNCTURE: CPT

## 2024-04-23 PROCEDURE — 85025 COMPLETE CBC W/AUTO DIFF WBC: CPT

## 2024-04-23 PROCEDURE — 84100 ASSAY OF PHOSPHORUS: CPT

## 2024-04-23 PROCEDURE — 83970 ASSAY OF PARATHORMONE: CPT

## 2024-04-23 RX ORDER — TIOTROPIUM BROMIDE AND OLODATEROL 3.124; 2.736 UG/1; UG/1
2 SPRAY, METERED RESPIRATORY (INHALATION)
Qty: 3 EACH | Refills: 3 | Status: SHIPPED | OUTPATIENT
Start: 2024-04-23

## 2024-06-10 ENCOUNTER — TELEPHONE (OUTPATIENT)
Dept: PULMONOLOGY | Facility: CLINIC | Age: 74
End: 2024-06-10

## 2024-06-10 NOTE — TELEPHONE ENCOUNTER
Caller: Nica Traylor    Relationship to patient: Self    Best call back number: [phone]     Patient is needing: CAN'T USE A SLEEP MACHINE DUE TO NOT BEING ABLE TO TOLERATE MASK. HEART RATE DROPPING DOWN TO 40 BPM DURING THE NIGHT. SHE STATES THAT HER CARDIOLOGY PROVIDER ADVISED HER TO ASK ABOUT HAVING OXYGEN ORDERED. PLEASE CALL BACK TO ADVISE.

## 2024-06-11 ENCOUNTER — OFFICE VISIT (OUTPATIENT)
Dept: PULMONOLOGY | Facility: CLINIC | Age: 74
End: 2024-06-11
Payer: MEDICARE

## 2024-06-11 VITALS
WEIGHT: 276.7 LBS | OXYGEN SATURATION: 94 % | HEART RATE: 70 BPM | BODY MASS INDEX: 41.94 KG/M2 | DIASTOLIC BLOOD PRESSURE: 95 MMHG | RESPIRATION RATE: 16 BRPM | SYSTOLIC BLOOD PRESSURE: 118 MMHG | TEMPERATURE: 98.6 F | HEIGHT: 68 IN

## 2024-06-11 DIAGNOSIS — R06.02 SHORTNESS OF BREATH: ICD-10-CM

## 2024-06-11 DIAGNOSIS — J43.2 CENTRILOBULAR EMPHYSEMA: Primary | ICD-10-CM

## 2024-06-11 DIAGNOSIS — R06.83 SNORING: ICD-10-CM

## 2024-06-11 DIAGNOSIS — G47.10 EXCESSIVE SLEEPINESS: ICD-10-CM

## 2024-06-11 DIAGNOSIS — G47.30 OBSERVED SLEEP APNEA: ICD-10-CM

## 2024-06-11 PROCEDURE — 3074F SYST BP LT 130 MM HG: CPT | Performed by: NURSE PRACTITIONER

## 2024-06-11 PROCEDURE — 99214 OFFICE O/P EST MOD 30 MIN: CPT | Performed by: NURSE PRACTITIONER

## 2024-06-11 PROCEDURE — 1160F RVW MEDS BY RX/DR IN RCRD: CPT | Performed by: NURSE PRACTITIONER

## 2024-06-11 PROCEDURE — 1159F MED LIST DOCD IN RCRD: CPT | Performed by: NURSE PRACTITIONER

## 2024-06-11 PROCEDURE — 3080F DIAST BP >= 90 MM HG: CPT | Performed by: NURSE PRACTITIONER

## 2024-06-11 RX ORDER — SEMAGLUTIDE 0.25 MG/.5ML
0.25 INJECTION, SOLUTION SUBCUTANEOUS
COMMUNITY
Start: 2024-06-07

## 2024-06-11 RX ORDER — TOPIRAMATE 25 MG/1
25 TABLET ORAL DAILY
COMMUNITY
Start: 2024-06-10

## 2024-06-11 NOTE — PROGRESS NOTES
Primary Care Provider  Wilder Morales MD   Referring Provider  No ref. provider found    Patient Complaint  Shortness of Breath and Follow-up (Pt  here for SOB)      SUBJECTIVE    History of Presenting Illness  Nica Traylor is a pleasant 73 y.o. female who presents to [facility] for stating she cannot sleep and use her sleep machine because she cannot tolerate the mask and her heart rate is dropping down to the 40s.  She is here to inquire about having oxygen ordered as cardiology told her she needed oxygen.  Patient confirmed it was her heart rate not oxygen sat that dropped as she wore a monitor for her heart for two weeks. She states that they stopped her metoprolol last Friday.     Patient previously has been under the care of of sleep medicine, Dr. Hernandez for her CRISTINO.  Patient has been unable to tolerate his CPAP mask because of nosebleeds in the past.  Patient does not want to go back to a CPAP.    She does have shortness of air with exertional activity but improves with rest. Denies coughing, wheezing, headaches, chest pain, weight loss or hemoptysis. Denies fevers, chills and night sweats. Nica Traylor is able to perform ADLs without difficulties and denies any swollen glands/lymph nodes in the head or neck.    I have personally reviewed the review of systems, past family, social, medical and surgical histories; and agree with their findings.    Review of Systems  Constitutional symptoms:  Denied complaints   Ear, nose, throat: Denied complaints  Cardiovascular:  Denied complaints  Respiratory: shortness of air  Gastrointestinal: Denied complaints  Musculoskeletal: Denied complaints    Family History   Problem Relation Age of Onset    Stroke Mother     Heart disease Mother     Diabetes Mother     Pancreatic cancer Mother     Heart disease Father     Heart disease Sister     Cervical cancer Sister     Uterine cancer Sister     Heart  disease Brother     Stroke Other         Maternal grandparent/Paternal grandparent    Heart disease Other         Maternal grandparent/Paternal grandparent    Stroke Maternal Aunt     Heart disease Maternal Aunt         Social History     Socioeconomic History    Marital status:    Tobacco Use    Smoking status: Former     Current packs/day: 0.00     Average packs/day: 0.8 packs/day for 25.0 years (20.0 ttl pk-yrs)     Types: Cigarettes     Start date:      Quit date: 2016     Years since quittin.4     Passive exposure: Never    Smokeless tobacco: Never   Vaping Use    Vaping status: Never Used   Substance and Sexual Activity    Alcohol use: Never     Comment: Does not drink    Drug use: Never    Sexual activity: Defer        Past Medical History:   Diagnosis Date    Asthma     Burning mouth syndrome     COPD (chronic obstructive pulmonary disease)     Depression     Diastolic CHF 2021    Essential hypertension 2012    GERD (gastroesophageal reflux disease)     Hyperlipidemia     Oral lesion     Paroxysmal atrial fibrillation 2021        Immunization History   Administered Date(s) Administered    COVID-19 (MODERNA) 1st,2nd,3rd Dose Monovalent 2021, 2021, 2021, 2021    Flu Vaccine Quad PF >36MO 2014    Flu Vaccine Split Quad 2005, 10/29/2008, 09/10/2010, 11/10/2011, 2014    Fluad Quad 65+ 2020    Fluzone (or Fluarix & Flulaval for VFC) >6mos 2014    Fluzone High Dose =>65 Years ([employer] ONLY) 2015, 10/24/2016, 2017, 2018, 10/11/2019    Fluzone High-Dose 65+yrs 10/09/2021, 2022, 10/26/2023    Influenza TIV (IM) 2005, 10/29/2008, 09/10/2010, 11/10/2011    Influenza, Unspecified 2014, 10/09/2021, 2022    Pneumococcal Conjugate 13-Valent (PCV13) 2015    Pneumococcal Polysaccharide (PPSV23) 2017    Shingrix 2020, 2020    Tdap 07/15/2013       Allergies   Allergen Reactions     Propafenone Other (See Comments)     Drops B/p    Cephalexin Unknown - Low Severity     Mouth sores    Erythromycin Other (See Comments)     Stroke like    Farxiga [Dapagliflozin] Other (See Comments)     Patient states she got a yeast infection    Mercury Unknown - Low Severity    Metoclopramide Other (See Comments)     Decreased blood pressure      Moxifloxacin Hcl Unknown - Low Severity    Penicillins Unknown - Low Severity          Current Outpatient Medications:     acetaminophen (TYLENOL) 500 MG tablet, Take 1 tablet by mouth., Disp: , Rfl:     allopurinol (ZYLOPRIM) 300 MG tablet, Take 1 tablet by mouth Daily., Disp: , Rfl:     atorvastatin (LIPITOR) 80 MG tablet, Take 1 tablet by mouth Daily., Disp: , Rfl:     baclofen (LIORESAL) 10 MG tablet, Take 1 tablet by mouth 3 (Three) Times a Day., Disp: , Rfl:     Cholecalciferol (VITAMIN D3 PO), Take 2,000 Units by mouth Daily., Disp: , Rfl:     citalopram (CeleXA) 20 MG tablet, Take 1 tablet by mouth Daily., Disp: , Rfl:     diazePAM (VALIUM) 2 MG tablet, Take 1 tablet by mouth Every 6 (Six) Hours As Needed (As needed for vertigo)., Disp: 10 tablet, Rfl: 0    diphenhydrAMINE (BENADRYL) 25 MG tablet, Take 1 tablet by mouth Every 6 (Six) Hours As Needed., Disp: , Rfl:     Eliquis 5 MG tablet tablet, TAKE 1 TABLET BY MOUTH TWICE A DAY, Disp: 180 tablet, Rfl: 3    estradiol (ESTRACE) 0.1 MG/GM vaginal cream, estradiol 0.01% (0.1 mg/gram) vaginal cream, Disp: , Rfl:     flecainide (TAMBOCOR) 50 MG tablet, Take 1 tablet by mouth 2 (Two) Times a Day., Disp: , Rfl:     furosemide (LASIX) 40 MG tablet, Take 0.5 tablets by mouth., Disp: , Rfl:     gabapentin (NEURONTIN) 300 MG capsule, Take 1 capsule by mouth Daily., Disp: , Rfl:     HYDROcodone-acetaminophen (NORCO)  MG per tablet, hydrocodone 10 mg-acetaminophen 325 mg tablet, Disp: , Rfl:     levalbuterol (XOPENEX HFA) 45 MCG/ACT inhaler, Inhale 2 puffs 4 (Four) Times a Day As Needed for Wheezing., Disp: 1 each,  Rfl: 5    metoprolol succinate XL (TOPROL-XL) 25 MG 24 hr tablet, TAKE 1/2 TABLET BY MOUTH EVERY DAY, Disp: 45 tablet, Rfl: 3    mirtazapine (REMERON) 7.5 MG half tablet, Take 2 half tablet by mouth Every Night., Disp: , Rfl:     multivitamin (THERAGRAN) tablet tablet, Take  by mouth., Disp: , Rfl:     ondansetron ODT (ZOFRAN-ODT) 8 MG disintegrating tablet, Place 1 tablet on the tongue Every 8 (Eight) Hours As Needed for Nausea or Vomiting., Disp: 12 tablet, Rfl: 0    pantoprazole (PROTONIX) 40 MG EC tablet, TAKE 1 TABLET BY MOUTH DAILY, Disp: 90 tablet, Rfl: 2    polyethylene glycol (MIRALAX) 17 g packet, Take 17 g by mouth Daily., Disp: , Rfl:     potassium chloride (K-DUR,KLOR-CON) 20 MEQ CR tablet, potassium chloride ER 20 mEq tablet,extended release, Disp: , Rfl:     prochlorperazine (COMPAZINE) 5 MG tablet, TAKE 1 TABLET BY MOUTH EVERY 6 HOURS AS NEEDED FOR NAUSEA OR VOMITING, Disp: 30 tablet, Rfl: 1    sacubitril-valsartan (ENTRESTO) 24-26 MG tablet, Take 1 tablet by mouth., Disp: , Rfl:     spironolactone (ALDACTONE) 25 MG tablet, Take 1 tablet by mouth Daily., Disp: , Rfl:     tiotropium bromide-olodaterol (Stiolto Respimat) 2.5-2.5 MCG/ACT aerosol solution inhaler, Inhale 2 puffs Daily., Disp: 3 each, Rfl: 3    Farxiga 10 MG tablet, Take 10 mg by mouth Daily. (Patient not taking: Reported on 1/18/2024), Disp: , Rfl:     meclizine (ANTIVERT) 25 MG tablet, Take 1 tablet by mouth 4 (Four) Times a Day As Needed for Dizziness. (Patient not taking: Reported on 1/18/2024), Disp: 60 tablet, Rfl: 0    promethazine (PHENERGAN) 25 MG tablet, , Disp: , Rfl:     Semaglutide-Weight Management (Wegovy) 0.25 MG/0.5ML solution auto-injector, Inject 0.5 mL under the skin into the appropriate area as directed. (Patient not taking: Reported on 6/11/2024), Disp: , Rfl:     topiramate (TOPAMAX) 25 MG tablet, Take 1 tablet by mouth Daily. (Patient not taking: Reported on 6/11/2024), Disp: , Rfl:            Vital Signs   BP  "118/95 (BP Location: Left arm, Patient Position: Sitting, Cuff Size: Large Adult)   Pulse 70   Temp 98.6 °F (37 °C) (Temporal)   Resp 16   Ht 172.7 cm (68\")   Wt 126 kg (276 lb 11.2 oz)   SpO2 94% Comment: room air  BMI 42.07 kg/m²       OBJECTIVE    Physical Exam  Vital Signs Reviewed   WDWN, Alert, NAD, obese.    HEENT:  PERRL, EOMI.  OP, nares clear  Neck:  Supple, no JVD, no thyromegaly  Chest:  good aeration, clear to auscultation bilaterally, tympanic to percussion bilaterally, no work of breathing noted  CV: RRR, no MGR, pulses 2+, equal.  Abd:  Soft, NT, ND, + BS, no HSM  EXT:  no clubbing, no cyanosis, no edema  Neuro:  A&Ox3, CN grossly intact, no focal deficits.  Skin: No rashes or lesions noted    Results Review  I have personally reviewed the prior office notes, hospital records, labs, and diagnostics.    ASSESSMENT         Patient Active Problem List   Diagnosis    Hyperlipidemia    Essential hypertension    Atrial fibrillation    Diastolic CHF    Pulmonary emphysema    Dyspnea    Tobacco abuse, in remission    Excessive sleepiness    Observed sleep apnea    Snoring    Class 3 severe obesity due to excess calories without serious comorbidity with body mass index (BMI) of 40.0 to 44.9 in adult    Arthritis    Atrophic vaginitis    Burning mouth syndrome    Chronic anticoagulation    DDD (degenerative disc disease), cervical    Depression    Gastroparesis    Heart failure with preserved ejection fraction    Neuropathy    Stage 3b chronic kidney disease       Encounter Diagnoses   Name Primary?    Centrilobular emphysema Yes    Excessive sleepiness     Observed sleep apnea     Snoring     Shortness of breath       PLAN  -6 min walk in office with patient not qualifying for oxygen  -Overnight pulse oximetry test ordered, will notify of results when available  -patient to continue with Stiolto maintenance inhaler and xopenex rescue inhaler  -Follow-up at next scheduled appointment or sooner if " needed  Diagnoses and all orders for this visit:    1. Centrilobular emphysema (Primary)  -     6 Minute Walk Test; Future  -     Overnight Sleep Oximetry Study; Future    2. Excessive sleepiness  -     6 Minute Walk Test; Future  -     Overnight Sleep Oximetry Study; Future    3. Observed sleep apnea  -     6 Minute Walk Test; Future  -     Overnight Sleep Oximetry Study; Future    4. Snoring  -     6 Minute Walk Test; Future  -     Overnight Sleep Oximetry Study; Future    5. Shortness of breath  -     6 Minute Walk Test; Future  -     Overnight Sleep Oximetry Study; Future           Smoking status:former, quit 2016, 20 pack history  Vaccination status:reviewed  Medications personally reviewed    Follow Up  Return for Next scheduled follow up.    Patient was given instructions and counseling regarding her condition or for health maintenance advice. Please see specific information pulled into the AVS if appropriate.     I spent 25 minutes caring for Nica Traylor on this date of service. This time includes time spent by me in the following activities:preparing for the visit, reviewing tests, obtaining and/or reviewing a separately obtained history, performing a medically appropriate examination and/or evaluation, counseling and educating the patient/family/caregiver, ordering medications, tests, or procedures, documenting information in the medical record, independently interpreting results and communicating that information with the patient/family/caregiver and answered questions family members, discuss medications.

## 2024-06-13 ENCOUNTER — TELEPHONE (OUTPATIENT)
Dept: PULMONOLOGY | Facility: CLINIC | Age: 74
End: 2024-06-13

## 2024-06-13 NOTE — TELEPHONE ENCOUNTER
Hub staff attempted to follow warm transfer process and was unsuccessful     Caller: Nica Traylor    Relationship to patient: Self    Best call back number: [phone]     Patient is needing: PATIENT STATES THAT SHE IS SUPPOSED TO HAVE A HOME OXYGEN TEST, BUT HAS NOT HEARD ANYTHING ABOUT SETTING THIS UP. PLEASE CALL BACK TO ADVISE.

## 2024-06-14 NOTE — TELEPHONE ENCOUNTER
Called adia as overnight was sent on 6/11.  She's looking to see if pt was received or if pt hasn't answered.  Will call pt when I know.

## 2024-06-25 DIAGNOSIS — G47.34 NOCTURNAL HYPOXIA: Primary | ICD-10-CM

## 2024-07-11 ENCOUNTER — TELEPHONE (OUTPATIENT)
Dept: PULMONOLOGY | Facility: CLINIC | Age: 74
End: 2024-07-11

## 2024-07-11 NOTE — TELEPHONE ENCOUNTER
Hub staff attempted to follow warm transfer process and was unsuccessful     Caller: Nica Traylor    Relationship to patient: Self    Best call back number:     [phone] (Mobile)       Patient is needing: PTS IS NEEDING TO SPEAK TO SOMEONE ASA ABOUT A COMPLAINT WITH [facility], THEY STATED THEY WERE GOING TO CALL HER ON MONDAY FOR OXYGEN BUT PT HASN'T HEARD FROM ANYONE, PT IS A BIT FRUSTRATED AND WOULD LIKE A CALL BACK ASAP

## 2024-07-12 NOTE — TELEPHONE ENCOUNTER
I called and spoke with pt.  She has been set up as of today.  Insurance wasn't excepting the DX codes that were given on [employer] ox.  This has been taken care of and pt is well.

## 2024-08-29 ENCOUNTER — OFFICE VISIT (OUTPATIENT)
Dept: PULMONOLOGY | Facility: CLINIC | Age: 74
End: 2024-08-29
Payer: MEDICARE

## 2024-08-29 VITALS
RESPIRATION RATE: 16 BRPM | SYSTOLIC BLOOD PRESSURE: 110 MMHG | HEIGHT: 68 IN | TEMPERATURE: 97.6 F | HEART RATE: 68 BPM | BODY MASS INDEX: 40.89 KG/M2 | OXYGEN SATURATION: 94 % | WEIGHT: 269.8 LBS | DIASTOLIC BLOOD PRESSURE: 57 MMHG

## 2024-08-29 DIAGNOSIS — G47.34 NOCTURNAL HYPOXIA: ICD-10-CM

## 2024-08-29 DIAGNOSIS — I48.0 PAROXYSMAL ATRIAL FIBRILLATION: Chronic | ICD-10-CM

## 2024-08-29 DIAGNOSIS — I50.33 ACUTE ON CHRONIC DIASTOLIC CHF (CONGESTIVE HEART FAILURE): Chronic | ICD-10-CM

## 2024-08-29 DIAGNOSIS — R06.09 DYSPNEA ON EXERTION: ICD-10-CM

## 2024-08-29 DIAGNOSIS — G47.10 EXCESSIVE SLEEPINESS: ICD-10-CM

## 2024-08-29 DIAGNOSIS — G47.33 OSA (OBSTRUCTIVE SLEEP APNEA): Chronic | ICD-10-CM

## 2024-08-29 DIAGNOSIS — F17.211 NICOTINE DEPENDENCE, CIGARETTES, IN REMISSION: ICD-10-CM

## 2024-08-29 DIAGNOSIS — J43.2 CENTRILOBULAR EMPHYSEMA: Primary | Chronic | ICD-10-CM

## 2024-08-29 PROCEDURE — 1159F MED LIST DOCD IN RCRD: CPT | Performed by: NURSE PRACTITIONER

## 2024-08-29 PROCEDURE — 3078F DIAST BP <80 MM HG: CPT | Performed by: NURSE PRACTITIONER

## 2024-08-29 PROCEDURE — 99213 OFFICE O/P EST LOW 20 MIN: CPT | Performed by: NURSE PRACTITIONER

## 2024-08-29 PROCEDURE — 1160F RVW MEDS BY RX/DR IN RCRD: CPT | Performed by: NURSE PRACTITIONER

## 2024-08-29 PROCEDURE — 3074F SYST BP LT 130 MM HG: CPT | Performed by: NURSE PRACTITIONER

## 2024-08-29 RX ORDER — BUDESONIDE AND FORMOTEROL FUMARATE DIHYDRATE 160; 4.5 UG/1; UG/1
2 AEROSOL RESPIRATORY (INHALATION) 2 TIMES DAILY
COMMUNITY
Start: 2024-07-13

## 2024-08-29 RX ORDER — POTASSIUM CHLORIDE 1500 MG/1
1 TABLET, EXTENDED RELEASE ORAL DAILY
COMMUNITY
Start: 2024-06-23 | End: 2024-08-29 | Stop reason: SDUPTHER

## 2024-08-29 RX ORDER — SULFAMETHOXAZOLE/TRIMETHOPRIM 800-160 MG
1 TABLET ORAL EVERY 12 HOURS SCHEDULED
COMMUNITY
Start: 2024-07-31 | End: 2024-08-29

## 2024-08-29 NOTE — PROGRESS NOTES
Primary Care Provider  Wilder Morales MD     Referring Provider  No ref. provider found     Chief Complaint  Emphysema, Shortness of Breath (No more than normal ), and Follow-up (4 month f/up )    Subjective          Nica Traylor presents to [facility]ULMON[facility] & CRITICAL CARE MEDICINE  History of Present Illness  Nica Traylor is a 73 y.o. female patient of Dr. Reynolds here for management of centrilobular emphysema, tobacco abuse of cigarettes in remission, obstructive sleep apnea under the care of sleep medicine, congestive diastolic heart failure and dyspnea on exertion.      Patient states she is doing okay since her last office visit.  She denies using antibiotics or steroids for her lungs.  She denies any current fevers or chills.her shortness of breath is mild in severity, worse with exertion and improved with rest.  She continues to use Stiolto as prescribed.  She does have Xopenex to use as needed.  She states that she did have a pacemaker placed 2.5 weeks ago and is feeling somewhat better.  She continues wear 2 L of oxygen at night and is benefiting from its use.  She does have a history of sleep apnea but does not wear a CPAP due to nosebleeds.  Overall, she is that she is doing okay and has no additional concerns at this time.  She is able to perform her ADLs without difficulty.  She is up-to-date with her COVID, flu and pneumonia vaccines.     Her history of smoking is   Tobacco Use: Medium Risk (8/29/2024)    Patient History     Smoking Tobacco Use: Former     Smokeless Tobacco Use: Never     Passive Exposure: Never   .    Review of Systems   Constitutional:  Negative for chills, fatigue, fever, unexpected weight gain and unexpected weight loss.   HENT:  Congestion: Nasal.    Respiratory:  Positive for cough and shortness of breath. Negative for apnea and wheezing.         Negative for Hemoptysis     Cardiovascular:  Negative for chest pain, palpitations and leg  swelling.   Skin:         Negative for cyanosis      Sleep: Negative for Excessive daytime sleepiness  Negative for morning headaches  Negative for Snoring    Family History   Problem Relation Age of Onset    Stroke Mother     Heart disease Mother     Diabetes Mother     Pancreatic cancer Mother     Heart disease Father     Heart disease Sister     Cervical cancer Sister     Uterine cancer Sister     Heart disease Brother     Stroke Other         Maternal grandparent/Paternal grandparent    Heart disease Other         Maternal grandparent/Paternal grandparent    Stroke Maternal Aunt     Heart disease Maternal Aunt         Social History     Socioeconomic History    Marital status:    Tobacco Use    Smoking status: Former     Current packs/day: 0.00     Average packs/day: 0.8 packs/day for 25.0 years (20.0 ttl pk-yrs)     Types: Cigarettes     Start date:      Quit date:      Years since quittin.6     Passive exposure: Never    Smokeless tobacco: Never   Vaping Use    Vaping status: Never Used   Substance and Sexual Activity    Alcohol use: Never     Comment: Does not drink    Drug use: Never    Sexual activity: Defer        Past Medical History:   Diagnosis Date    Asthma     Burning mouth syndrome     COPD (chronic obstructive pulmonary disease)     Depression     Diastolic CHF 2021    Essential hypertension 2012    GERD (gastroesophageal reflux disease)     Hyperlipidemia     Oral lesion     Paroxysmal atrial fibrillation 2021        Immunization History   Administered Date(s) Administered    COVID-19 (MODERNA) 1st,2nd,3rd Dose Monovalent 2021, 2021, 2021, 2021    Flu Vaccine Quad PF >36MO 2014    Flu Vaccine Split Quad 2005, 10/29/2008, 09/10/2010, 11/10/2011, 2014    Fluad Quad 65+ 2020    Fluzone (or Fluarix & Flulaval for VFC) >6mos 2014    Fluzone High-Dose 65+YRS 2015, 10/24/2016, 2017, 2018,  10/11/2019    Fluzone High-Dose 65+yrs 10/09/2021, 09/27/2022, 10/26/2023    Influenza TIV (IM) 11/22/2005, 10/29/2008, 09/10/2010, 11/10/2011    Influenza, Unspecified 09/25/2014, 10/09/2021, 09/27/2022    Pneumococcal Conjugate 13-Valent (PCV13) 12/01/2015    Pneumococcal Polysaccharide (PPSV23) 01/26/2017    Shingrix 07/07/2020, 09/23/2020    Tdap 07/15/2013         Allergies   Allergen Reactions    Propafenone Other (See Comments)     Drops B/p    Cephalexin Unknown - Low Severity     Mouth sores    Erythromycin Other (See Comments)     Stroke like    Farxiga [Dapagliflozin] Other (See Comments)     Patient states she got a yeast infection    Mercury Unknown - Low Severity    Metoclopramide Other (See Comments)     Decreased blood pressure      Moxifloxacin Hcl Unknown - Low Severity    Penicillins Unknown - Low Severity          Current Outpatient Medications:     acetaminophen (TYLENOL) 500 MG tablet, Take 1 tablet by mouth., Disp: , Rfl:     allopurinol (ZYLOPRIM) 300 MG tablet, Take 1 tablet by mouth Daily., Disp: , Rfl:     atorvastatin (LIPITOR) 80 MG tablet, Take 1 tablet by mouth Daily., Disp: , Rfl:     baclofen (LIORESAL) 10 MG tablet, Take 1 tablet by mouth 3 (Three) Times a Day., Disp: , Rfl:     Cholecalciferol (VITAMIN D3 PO), Take 2,000 Units by mouth Daily., Disp: , Rfl:     citalopram (CeleXA) 20 MG tablet, Take 1 tablet by mouth Daily., Disp: , Rfl:     diazePAM (VALIUM) 2 MG tablet, Take 1 tablet by mouth Every 6 (Six) Hours As Needed (As needed for vertigo)., Disp: 10 tablet, Rfl: 0    diphenhydrAMINE (BENADRYL) 25 MG tablet, Take 1 tablet by mouth Every 6 (Six) Hours As Needed., Disp: , Rfl:     Eliquis 5 MG tablet tablet, TAKE 1 TABLET BY MOUTH TWICE A DAY, Disp: 180 tablet, Rfl: 3    estradiol (ESTRACE) 0.1 MG/GM vaginal cream, estradiol 0.01% (0.1 mg/gram) vaginal cream, Disp: , Rfl:     flecainide (TAMBOCOR) 50 MG tablet, Take 1 tablet by mouth 2 (Two) Times a Day., Disp: , Rfl:      furosemide (LASIX) 40 MG tablet, Take 0.5 tablets by mouth., Disp: , Rfl:     gabapentin (NEURONTIN) 300 MG capsule, Take 1 capsule by mouth Daily., Disp: , Rfl:     HYDROcodone-acetaminophen (NORCO)  MG per tablet, hydrocodone 10 mg-acetaminophen 325 mg tablet, Disp: , Rfl:     levalbuterol (XOPENEX HFA) 45 MCG/ACT inhaler, Inhale 2 puffs 4 (Four) Times a Day As Needed for Wheezing., Disp: 1 each, Rfl: 5    metoprolol succinate XL (TOPROL-XL) 25 MG 24 hr tablet, TAKE 1/2 TABLET BY MOUTH EVERY DAY, Disp: 45 tablet, Rfl: 3    mirtazapine (REMERON) 7.5 MG half tablet, Take 2 half tablet by mouth Every Night., Disp: , Rfl:     multivitamin (THERAGRAN) tablet tablet, Take  by mouth., Disp: , Rfl:     ondansetron ODT (ZOFRAN-ODT) 8 MG disintegrating tablet, Place 1 tablet on the tongue Every 8 (Eight) Hours As Needed for Nausea or Vomiting., Disp: 12 tablet, Rfl: 0    pantoprazole (PROTONIX) 40 MG EC tablet, TAKE 1 TABLET BY MOUTH DAILY, Disp: 90 tablet, Rfl: 2    polyethylene glycol (MIRALAX) 17 g packet, Take 17 g by mouth Daily., Disp: , Rfl:     potassium chloride (K-DUR,KLOR-CON) 20 MEQ CR tablet, potassium chloride ER 20 mEq tablet,extended release, Disp: , Rfl:     prochlorperazine (COMPAZINE) 5 MG tablet, TAKE 1 TABLET BY MOUTH EVERY 6 HOURS AS NEEDED FOR NAUSEA OR VOMITING, Disp: 30 tablet, Rfl: 1    sacubitril-valsartan (ENTRESTO) 24-26 MG tablet, Take 1 tablet by mouth., Disp: , Rfl:     spironolactone (ALDACTONE) 25 MG tablet, Take 1 tablet by mouth Daily., Disp: , Rfl:     tiotropium bromide-olodaterol (Stiolto Respimat) 2.5-2.5 MCG/ACT aerosol solution inhaler, Inhale 2 puffs Daily., Disp: 3 each, Rfl: 3    budesonide-formoterol (SYMBICORT) 160-4.5 MCG/ACT inhaler, Inhale 2 puffs 2 (Two) Times a Day. (Patient not taking: Reported on 8/29/2024), Disp: , Rfl:     Farxiga 10 MG tablet, Take 10 mg by mouth Daily. (Patient not taking: Reported on 1/18/2024), Disp: , Rfl:     meclizine (ANTIVERT) 25 MG  "tablet, Take 1 tablet by mouth 4 (Four) Times a Day As Needed for Dizziness. (Patient not taking: Reported on 1/18/2024), Disp: 60 tablet, Rfl: 0    promethazine (PHENERGAN) 25 MG tablet, , Disp: , Rfl:     Semaglutide-Weight Management (Wegovy) 0.25 MG/0.5ML solution auto-injector, Inject 0.5 mL under the skin into the appropriate area as directed. (Patient not taking: Reported on 6/11/2024), Disp: , Rfl:     topiramate (TOPAMAX) 25 MG tablet, Take 1 tablet by mouth Daily. (Patient not taking: Reported on 6/11/2024), Disp: , Rfl:      Objective   Physical Exam  Constitutional:       General: She is not in acute distress.     Appearance: Normal appearance. She is normal weight.   HENT:      Right Ear: Hearing normal.      Left Ear: Hearing normal.      Nose: No nasal tenderness or congestion.      Mouth/Throat:      Mouth: Mucous membranes are moist. No oral lesions.   Eyes:      Extraocular Movements: Extraocular movements intact.      Pupils: Pupils are equal, round, and reactive to light.   Cardiovascular:      Rate and Rhythm: Normal rate and regular rhythm.      Pulses: Normal pulses.      Heart sounds: Normal heart sounds. No murmur heard.  Pulmonary:      Effort: Pulmonary effort is normal.      Breath sounds: Normal breath sounds. No wheezing, rhonchi or rales.   Musculoskeletal:      Right lower leg: No edema.      Left lower leg: No edema.   Skin:     General: Skin is warm and dry.      Findings: No lesion or rash.   Neurological:      General: No focal deficit present.      Mental Status: She is alert and oriented to person, place, and time.   Psychiatric:         Mood and Affect: Affect normal. Mood is not anxious or depressed.         Vital Signs:   /57 (BP Location: Right arm, Patient Position: Sitting, Cuff Size: Large Adult)   Pulse 68   Temp 97.6 °F (36.4 °C) (Oral)   Resp 16   Ht 172.7 cm (68\")   Wt 122 kg (269 lb 12.8 oz)   SpO2 94% Comment: RA; 2 L's HS  BMI 41.02 kg/m²        Result " Review :   The following data was reviewed by: PRAVIN Coker on 08/29/2024:  CMP          4/15/2024    13:19 4/23/2024    14:10 6/10/2024    15:28   CMP   Glucose 117  105     BUN 26  22     Creatinine 2.28  1.62     EGFR 22.2  33.4     Sodium 140  141     Potassium 5.2  4.4  4.6       Chloride 101  102     Calcium 9.7  9.7     BUN/Creatinine Ratio 11.4  13.6     Anion Gap 12.3  12.8        Details          This result is from an external source.             CBC w/diff          4/23/2024    14:10   CBC w/Diff   WBC 9.76    RBC 4.48    Hemoglobin 11.7    Hematocrit 36.3    MCV 81.0    MCH 26.1    MCHC 32.2    RDW 18.2    Platelets 369    Neutrophil Rel % 63.5    Immature Granulocyte Rel % 0.4    Lymphocyte Rel % 25.1    Monocyte Rel % 8.6    Eosinophil Rel % 1.9    Basophil Rel % 0.5      Data reviewed : Radiologic studies chest CT 1/31/2024, overnight oximetry 6/29/2024, 6 minute walk 6/11/2024 and my last office note and PRAVIN Kern last office note    Procedures        Assessment and Plan    Diagnoses and all orders for this visit:    1. Centrilobular emphysema (Primary)    2. CRISTINO (obstructive sleep apnea)  Comments:  not on cpap    3. Diastolic CHF  Comments:  continue follow up with cardiology    4. Paroxysmal atrial fibrillation  Comments:  continue follow up with cardiology    5. Excessive sleepiness    6. Dyspnea on exertion    7. Nicotine dependence, cigarettes, in remission  Comments:  LDCT due in January 2025    8. Nocturnal hypoxia  Comments:  contiue nighttime oxygen          Follow Up   Return in about 4 months (around 12/29/2024) for Recheck with Krupa.  Patient was given instructions and counseling regarding her condition or for health maintenance advice. Please see specific information pulled into the AVS if appropriate.

## 2024-09-24 DIAGNOSIS — J43.2 CENTRILOBULAR EMPHYSEMA: Chronic | ICD-10-CM

## 2024-09-25 RX ORDER — LEVALBUTEROL TARTRATE 45 UG/1
AEROSOL, METERED ORAL
Qty: 1 EACH | Refills: 11 | Status: SHIPPED | OUTPATIENT
Start: 2024-09-25

## 2024-12-03 ENCOUNTER — OFFICE VISIT (OUTPATIENT)
Dept: PULMONOLOGY | Facility: CLINIC | Age: 74
End: 2024-12-03
Payer: MEDICARE

## 2024-12-03 ENCOUNTER — TRANSCRIBE ORDERS (OUTPATIENT)
Dept: ADMINISTRATIVE | Facility: HOSPITAL | Age: 74
End: 2024-12-03
Payer: MEDICARE

## 2024-12-03 VITALS
DIASTOLIC BLOOD PRESSURE: 62 MMHG | WEIGHT: 272 LBS | HEART RATE: 76 BPM | HEIGHT: 68 IN | SYSTOLIC BLOOD PRESSURE: 112 MMHG | BODY MASS INDEX: 41.22 KG/M2 | OXYGEN SATURATION: 95 % | TEMPERATURE: 97.6 F | RESPIRATION RATE: 16 BRPM

## 2024-12-03 DIAGNOSIS — R06.09 DYSPNEA ON EXERTION: ICD-10-CM

## 2024-12-03 DIAGNOSIS — F17.211 NICOTINE DEPENDENCE, CIGARETTES, IN REMISSION: ICD-10-CM

## 2024-12-03 DIAGNOSIS — Z78.0 MENOPAUSE: Primary | ICD-10-CM

## 2024-12-03 DIAGNOSIS — G47.33 OSA (OBSTRUCTIVE SLEEP APNEA): ICD-10-CM

## 2024-12-03 DIAGNOSIS — J43.2 CENTRILOBULAR EMPHYSEMA: Primary | Chronic | ICD-10-CM

## 2024-12-03 PROCEDURE — 1159F MED LIST DOCD IN RCRD: CPT | Performed by: NURSE PRACTITIONER

## 2024-12-03 PROCEDURE — 3074F SYST BP LT 130 MM HG: CPT | Performed by: NURSE PRACTITIONER

## 2024-12-03 PROCEDURE — 99213 OFFICE O/P EST LOW 20 MIN: CPT | Performed by: NURSE PRACTITIONER

## 2024-12-03 PROCEDURE — 3078F DIAST BP <80 MM HG: CPT | Performed by: NURSE PRACTITIONER

## 2024-12-03 PROCEDURE — 1160F RVW MEDS BY RX/DR IN RCRD: CPT | Performed by: NURSE PRACTITIONER

## 2024-12-03 RX ORDER — SULFAMETHOXAZOLE AND TRIMETHOPRIM 800; 160 MG/1; MG/1
TABLET ORAL
COMMUNITY
End: 2024-12-03

## 2024-12-03 RX ORDER — POTASSIUM CHLORIDE 1500 MG/1
20 TABLET, EXTENDED RELEASE ORAL
COMMUNITY
Start: 2024-09-20

## 2024-12-03 NOTE — PROGRESS NOTES
Primary Care Provider  Wilder Morales MD     Referring Provider  No ref. provider found     Chief Complaint  Emphysema, Shortness of Breath, and Follow-up (4 month f/up )    Subjective          Nica Traylor presents to [facility]  History of Present Illness  Nica Traylor is a 74 y.o. female patient of Dr. Reynolds here for management of centrilobular emphysema, tobacco abuse of cigarettes in remission, obstructive sleep apnea under the care of sleep medicine and dyspnea on exertion.       Patient states she is doing okay since her last office visit.  She denies using antibiotics or steroids for her lungs.  She denies any current fevers or chills.her shortness of breath is mild in severity, worse with exertion and improved with rest.  She continues to use Stiolto as prescribed.  She does have Xopenex to use as needed. She continues wear 2 L of oxygen at night and is benefiting from its use.  All, she is that she is doing well and has no additional concerns at this time.  She is able to perform her ADLs without difficulty.  She is up-to-date with her COVID, flu and pneumonia vaccines.     Her history of smoking is   Tobacco Use: Medium Risk (12/3/2024)    Patient History     Smoking Tobacco Use: Former     Smokeless Tobacco Use: Never     Passive Exposure: Never   .    Review of Systems   Constitutional:  Negative for chills, fatigue, fever, unexpected weight gain and unexpected weight loss.   HENT:  Congestion: Nasal.    Respiratory:  Positive for shortness of breath. Negative for apnea, cough and wheezing.         Negative for Hemoptysis     Cardiovascular:  Negative for chest pain, palpitations and leg swelling.   Skin:         Negative for cyanosis      Sleep: Negative for Excessive daytime sleepiness  Negative for morning headaches  Negative for Snoring    Family History   Problem Relation Age of Onset    Stroke Mother     Heart disease Mother      Diabetes Mother     Pancreatic cancer Mother     Heart disease Father     Heart disease Sister     Cervical cancer Sister     Uterine cancer Sister     Heart disease Brother     Stroke Other         Maternal grandparent/Paternal grandparent    Heart disease Other         Maternal grandparent/Paternal grandparent    Stroke Maternal Aunt     Heart disease Maternal Aunt         Social History     Socioeconomic History    Marital status:    Tobacco Use    Smoking status: Former     Current packs/day: 0.00     Average packs/day: 0.8 packs/day for 25.0 years (20.0 ttl pk-yrs)     Types: Cigarettes     Start date:      Quit date: 2016     Years since quittin.9     Passive exposure: Never    Smokeless tobacco: Never   Vaping Use    Vaping status: Never Used   Substance and Sexual Activity    Alcohol use: Never     Comment: Does not drink    Drug use: Never    Sexual activity: Defer        Past Medical History:   Diagnosis Date    Asthma     Burning mouth syndrome     COPD (chronic obstructive pulmonary disease)     Depression     Diastolic CHF 2021    Essential hypertension 2012    GERD (gastroesophageal reflux disease)     Hyperlipidemia     Oral lesion     Paroxysmal atrial fibrillation 2021        Immunization History   Administered Date(s) Administered    COVID-19 (MODERNA) 1st,2nd,3rd Dose Monovalent 2021, 2021, 2021, 2021    Flu Vaccine Quad PF >36MO 2014    Flu Vaccine Split Quad 2005, 10/29/2008, 09/10/2010, 11/10/2011, 2014    Fluad Quad 65+ 2020    Fluzone (or Fluarix & Flulaval for VFC) >6mos 2014    Fluzone High-Dose 65+YRS 2015, 10/24/2016, 2017, 2018, 10/11/2019, 2024    Fluzone High-Dose 65+yrs 10/09/2021, 2022, 10/26/2023    Influenza TIV (IM) 2005, 10/29/2008, 09/10/2010, 11/10/2011    Influenza, Unspecified 2014, 10/09/2021, 2022    Pneumococcal Conjugate 13-Valent  (PCV13) 12/01/2015    Pneumococcal Polysaccharide (PPSV23) 01/26/2017    Shingrix 07/07/2020, 09/23/2020    Tdap 07/15/2013         Allergies   Allergen Reactions    Propafenone Other (See Comments)     Drops B/p    Cephalexin Unknown - Low Severity     Mouth sores    Erythromycin Other (See Comments)     Stroke like    Farxiga [Dapagliflozin] Other (See Comments)     Patient states she got a yeast infection    Mercury Unknown - Low Severity    Metoclopramide Other (See Comments)     Decreased blood pressure      Moxifloxacin Hcl Unknown - Low Severity    Penicillins Unknown - Low Severity          Current Outpatient Medications:     acetaminophen (TYLENOL) 500 MG tablet, Take 1 tablet by mouth., Disp: , Rfl:     allopurinol (ZYLOPRIM) 300 MG tablet, Take 1 tablet by mouth Daily., Disp: , Rfl:     atorvastatin (LIPITOR) 80 MG tablet, Take 1 tablet by mouth Daily., Disp: , Rfl:     baclofen (LIORESAL) 10 MG tablet, Take 1 tablet by mouth 3 (Three) Times a Day., Disp: , Rfl:     Cholecalciferol (VITAMIN D3 PO), Take 2,000 Units by mouth Daily., Disp: , Rfl:     citalopram (CeleXA) 20 MG tablet, Take 1 tablet by mouth Daily., Disp: , Rfl:     diazePAM (VALIUM) 2 MG tablet, Take 1 tablet by mouth Every 6 (Six) Hours As Needed (As needed for vertigo)., Disp: 10 tablet, Rfl: 0    diphenhydrAMINE (BENADRYL) 25 MG tablet, Take 1 tablet by mouth Every 6 (Six) Hours As Needed., Disp: , Rfl:     Eliquis 5 MG tablet tablet, TAKE 1 TABLET BY MOUTH TWICE A DAY, Disp: 180 tablet, Rfl: 3    estradiol (ESTRACE) 0.1 MG/GM vaginal cream, estradiol 0.01% (0.1 mg/gram) vaginal cream, Disp: , Rfl:     flecainide (TAMBOCOR) 50 MG tablet, Take 1 tablet by mouth 2 (Two) Times a Day., Disp: , Rfl:     furosemide (LASIX) 40 MG tablet, Take 0.5 tablets by mouth., Disp: , Rfl:     gabapentin (NEURONTIN) 300 MG capsule, Take 1 capsule by mouth Daily., Disp: , Rfl:     HYDROcodone-acetaminophen (NORCO)  MG per tablet, hydrocodone 10  mg-acetaminophen 325 mg tablet, Disp: , Rfl:     levalbuterol (XOPENEX HFA) 45 MCG/ACT inhaler, INHALE 2 PUFFS BY MOUTH FOUR TIMES DAILY AS NEEDED FOR WHEEZING, Disp: 1 each, Rfl: 11    metoprolol succinate XL (TOPROL-XL) 25 MG 24 hr tablet, TAKE 1/2 TABLET BY MOUTH EVERY DAY, Disp: 45 tablet, Rfl: 3    mirtazapine (REMERON) 7.5 MG half tablet, Take 2 half tablet by mouth Every Night., Disp: , Rfl:     multivitamin (THERAGRAN) tablet tablet, Take  by mouth., Disp: , Rfl:     ondansetron ODT (ZOFRAN-ODT) 8 MG disintegrating tablet, Place 1 tablet on the tongue Every 8 (Eight) Hours As Needed for Nausea or Vomiting., Disp: 12 tablet, Rfl: 0    pantoprazole (PROTONIX) 40 MG EC tablet, TAKE 1 TABLET BY MOUTH DAILY, Disp: 90 tablet, Rfl: 2    polyethylene glycol (MIRALAX) 17 g packet, Take 17 g by mouth Daily., Disp: , Rfl:     potassium chloride ER (K-TAB) 20 MEQ tablet controlled-release ER tablet, Take 1 tablet by mouth., Disp: , Rfl:     prochlorperazine (COMPAZINE) 5 MG tablet, TAKE 1 TABLET BY MOUTH EVERY 6 HOURS AS NEEDED FOR NAUSEA OR VOMITING, Disp: 30 tablet, Rfl: 1    sacubitril-valsartan (ENTRESTO) 24-26 MG tablet, Take 1 tablet by mouth., Disp: , Rfl:     Semaglutide-Weight Management (Wegovy) 0.25 MG/0.5ML solution auto-injector, Inject 0.5 mL under the skin into the appropriate area as directed., Disp: , Rfl:     spironolactone (ALDACTONE) 25 MG tablet, Take 1 tablet by mouth Daily., Disp: , Rfl:     tiotropium bromide-olodaterol (Stiolto Respimat) 2.5-2.5 MCG/ACT aerosol solution inhaler, Inhale 2 puffs Daily., Disp: 3 each, Rfl: 3    Farxiga 10 MG tablet, Take 10 mg by mouth Daily. (Patient not taking: Reported on 12/3/2024), Disp: , Rfl:     meclizine (ANTIVERT) 25 MG tablet, Take 1 tablet by mouth 4 (Four) Times a Day As Needed for Dizziness. (Patient not taking: Reported on 12/3/2024), Disp: 60 tablet, Rfl: 0    promethazine (PHENERGAN) 25 MG tablet, , Disp: , Rfl:     topiramate (TOPAMAX) 25 MG  "tablet, Take 1 tablet by mouth Daily. (Patient not taking: Reported on 12/3/2024), Disp: , Rfl:      Objective   Physical Exam  Constitutional:       General: She is not in acute distress.     Appearance: Normal appearance. She is normal weight.   HENT:      Right Ear: Hearing normal.      Left Ear: Hearing normal.      Nose: No nasal tenderness or congestion.      Mouth/Throat:      Mouth: Mucous membranes are moist. No oral lesions.   Eyes:      Extraocular Movements: Extraocular movements intact.      Pupils: Pupils are equal, round, and reactive to light.   Cardiovascular:      Rate and Rhythm: Normal rate and regular rhythm.      Pulses: Normal pulses.      Heart sounds: Normal heart sounds. No murmur heard.  Pulmonary:      Effort: Pulmonary effort is normal.      Breath sounds: Normal breath sounds. No wheezing, rhonchi or rales.   Musculoskeletal:      Right lower leg: No edema.      Left lower leg: No edema.   Skin:     General: Skin is warm and dry.      Findings: No lesion or rash.   Neurological:      General: No focal deficit present.      Mental Status: She is alert and oriented to person, place, and time.   Psychiatric:         Mood and Affect: Affect normal. Mood is not anxious or depressed.         Vital Signs:   /62 (BP Location: Right arm, Patient Position: Sitting, Cuff Size: Large Adult)   Pulse 76   Temp 97.6 °F (36.4 °C) (Oral)   Resp 16   Ht 172.7 cm (68\")   Wt 123 kg (272 lb)   SpO2 95% Comment: RA; 2 L's HS  BMI 41.36 kg/m²        Result Review :   The following data was reviewed by: PRAVIN Coker on 12/03/2024:  CMP          4/15/2024    13:19 4/23/2024    14:10 6/10/2024    15:28   CMP   Glucose 117  105     BUN 26  22     Creatinine 2.28  1.62     EGFR 22.2  33.4     Sodium 140  141     Potassium 5.2  4.4  4.6       Chloride 101  102     Calcium 9.7  9.7     BUN/Creatinine Ratio 11.4  13.6     Anion Gap 12.3  12.8        Details          This result is from an " external source.             CBC w/diff          4/23/2024    14:10 8/12/2024    12:50   CBC w/Diff   WBC 9.76  10.33       RBC 4.48  4.71       Hemoglobin 11.7  13.3       Hematocrit 36.3  40.5       MCV 81.0  86.0       MCH 26.1  28.2       MCHC 32.2  32.8       RDW 18.2  17.6       Platelets 369  310       Neutrophil Rel % 63.5  65.1       Immature Granulocyte Rel % 0.4  0.6       Lymphocyte Rel % 25.1  24.5       Monocyte Rel % 8.6  7.6       Eosinophil Rel % 1.9  1.7       Basophil Rel % 0.5  0.5          Details          This result is from an external source.             Data reviewed : Radiologic studies chest CT 1/31/2024, pulmonary function test 1/11/2021 and my last office note    Procedures        Assessment and Plan    Diagnoses and all orders for this visit:    1. Centrilobular emphysema (Primary)  Comments:  Continue Stiolto    2. CRISTINO (obstructive sleep apnea)  Comments:  Any nighttime oxygen    3. Dyspnea on exertion  Comments:  Continue Xopenex as needed    4. Nicotine dependence, cigarettes, in remission          Follow Up   Return in about 6 months (around 6/3/2025) for Recheck with Spencer.  Patient was given instructions and counseling regarding her condition or for health maintenance advice. Please see specific information pulled into the AVS if appropriate.

## 2024-12-31 DIAGNOSIS — K21.9 GASTROESOPHAGEAL REFLUX DISEASE, UNSPECIFIED WHETHER ESOPHAGITIS PRESENT: ICD-10-CM

## 2024-12-31 RX ORDER — PANTOPRAZOLE SODIUM 40 MG/1
40 TABLET, DELAYED RELEASE ORAL DAILY
Qty: 90 TABLET | Refills: 2 | Status: SHIPPED | OUTPATIENT
Start: 2024-12-31

## 2024-12-31 NOTE — TELEPHONE ENCOUNTER
Pt is requesting a refill of Pantoprazole 40 Mg tablet    Last refill: 10/3/2024   Last ov: 1/9/2024  Next ov: 1/9/2025

## 2025-01-09 ENCOUNTER — OFFICE VISIT (OUTPATIENT)
Dept: GASTROENTEROLOGY | Facility: CLINIC | Age: 75
End: 2025-01-09
Payer: MEDICARE

## 2025-01-09 VITALS
WEIGHT: 274 LBS | SYSTOLIC BLOOD PRESSURE: 115 MMHG | HEIGHT: 68 IN | BODY MASS INDEX: 41.52 KG/M2 | DIASTOLIC BLOOD PRESSURE: 65 MMHG

## 2025-01-09 DIAGNOSIS — K21.9 GASTROESOPHAGEAL REFLUX DISEASE, UNSPECIFIED WHETHER ESOPHAGITIS PRESENT: ICD-10-CM

## 2025-01-09 DIAGNOSIS — K31.84 GASTROPARESIS: Primary | ICD-10-CM

## 2025-01-09 DIAGNOSIS — K58.2 IRRITABLE BOWEL SYNDROME WITH BOTH CONSTIPATION AND DIARRHEA: ICD-10-CM

## 2025-01-09 DIAGNOSIS — R11.0 NAUSEA: ICD-10-CM

## 2025-01-09 RX ORDER — ONDANSETRON 8 MG/1
8 TABLET, ORALLY DISINTEGRATING ORAL EVERY 8 HOURS PRN
Qty: 30 TABLET | Refills: 1 | Status: SHIPPED | OUTPATIENT
Start: 2025-01-09

## 2025-01-09 RX ORDER — PANTOPRAZOLE SODIUM 40 MG/1
40 TABLET, DELAYED RELEASE ORAL DAILY
Qty: 90 TABLET | Refills: 2 | Status: SHIPPED | OUTPATIENT
Start: 2025-01-09

## 2025-01-09 NOTE — PROGRESS NOTES
Chief Complaint     Heartburn and gastroparesis    History of Present Illness     Nica Traylor is a 74 y.o. female who presents to [facility] GASTROENTEROLOGY for follow-up of gastroparesis, nausea and IBS-mixed.      She is taking miralax less often due to seepage.  Has a bowel movement every 3-4 days.  She isn't eating much.    Notices some intermittent bleeding related to hemorrhoids.      She is taking zofran as needed, but hasn't needed it very often.   Still struggling with vertigo, but taking meclizine and valium.      Reflux is controlled with protonix.         History      Past Medical History:   Diagnosis Date    Asthma     Burning mouth syndrome     COPD (chronic obstructive pulmonary disease)     Depression     Diastolic CHF 6/24/2021    Essential hypertension 6/20/2012    GERD (gastroesophageal reflux disease)     Hyperlipidemia     Oral lesion     Paroxysmal atrial fibrillation 05/24/2021     Past Surgical History:   Procedure Laterality Date    BLADDER SURGERY      CARPAL TUNNEL RELEASE      COLONOSCOPY      ENDOSCOPY  2020    GALLBLADDER SURGERY  2003    HYSTERECTOMY      PACEMAKER IMPLANTATION      UPPER GASTROINTESTINAL ENDOSCOPY       Family History   Problem Relation Age of Onset    Stroke Mother     Heart disease Mother     Diabetes Mother     Pancreatic cancer Mother     Heart disease Father     Heart disease Sister     Cervical cancer Sister     Uterine cancer Sister     Heart disease Brother     Stroke Other         Maternal grandparent/Paternal grandparent    Heart disease Other         Maternal grandparent/Paternal grandparent    Stroke Maternal Aunt     Heart disease Maternal Aunt         Current Medications       Current Outpatient Medications:     acetaminophen (TYLENOL) 500 MG tablet, Take 1 tablet by mouth., Disp: , Rfl:     allopurinol (ZYLOPRIM) 300 MG tablet, Take 1 tablet by mouth Daily., Disp: , Rfl:     atorvastatin (LIPITOR) 80 MG tablet, Take 1 tablet by  mouth Daily., Disp: , Rfl:     baclofen (LIORESAL) 10 MG tablet, Take 1 tablet by mouth 3 (Three) Times a Day., Disp: , Rfl:     Cholecalciferol (VITAMIN D3 PO), Take 2,000 Units by mouth Daily., Disp: , Rfl:     citalopram (CeleXA) 20 MG tablet, Take 1 tablet by mouth Daily., Disp: , Rfl:     diazePAM (VALIUM) 2 MG tablet, Take 1 tablet by mouth Every 6 (Six) Hours As Needed (As needed for vertigo)., Disp: 10 tablet, Rfl: 0    diphenhydrAMINE (BENADRYL) 25 MG tablet, Take 1 tablet by mouth Every 6 (Six) Hours As Needed., Disp: , Rfl:     Eliquis 5 MG tablet tablet, TAKE 1 TABLET BY MOUTH TWICE A DAY, Disp: 180 tablet, Rfl: 3    estradiol (ESTRACE) 0.1 MG/GM vaginal cream, estradiol 0.01% (0.1 mg/gram) vaginal cream, Disp: , Rfl:     flecainide (TAMBOCOR) 50 MG tablet, Take 1 tablet by mouth 2 (Two) Times a Day., Disp: , Rfl:     furosemide (LASIX) 40 MG tablet, Take 0.5 tablets by mouth., Disp: , Rfl:     gabapentin (NEURONTIN) 300 MG capsule, Take 1 capsule by mouth Daily., Disp: , Rfl:     HYDROcodone-acetaminophen (NORCO)  MG per tablet, hydrocodone 10 mg-acetaminophen 325 mg tablet, Disp: , Rfl:     levalbuterol (XOPENEX HFA) 45 MCG/ACT inhaler, INHALE 2 PUFFS BY MOUTH FOUR TIMES DAILY AS NEEDED FOR WHEEZING, Disp: 1 each, Rfl: 11    meclizine (ANTIVERT) 25 MG tablet, Take 1 tablet by mouth 4 (Four) Times a Day As Needed for Dizziness. (Patient taking differently: Take 1 tablet by mouth 2 (Two) Times a Day.), Disp: 60 tablet, Rfl: 0    metoprolol succinate XL (TOPROL-XL) 25 MG 24 hr tablet, TAKE 1/2 TABLET BY MOUTH EVERY DAY, Disp: 45 tablet, Rfl: 3    mirtazapine (REMERON) 7.5 MG half tablet, Take 2 half tablet by mouth Every Night., Disp: , Rfl:     multivitamin (THERAGRAN) tablet tablet, Take  by mouth., Disp: , Rfl:     ondansetron ODT (ZOFRAN-ODT) 8 MG disintegrating tablet, Place 1 tablet on the tongue Every 8 (Eight) Hours As Needed for Nausea or Vomiting., Disp: 30 tablet, Rfl: 1    pantoprazole  "(PROTONIX) 40 MG EC tablet, Take 1 tablet by mouth Daily., Disp: 90 tablet, Rfl: 2    polyethylene glycol (MIRALAX) 17 g packet, Take 17 g by mouth Daily., Disp: , Rfl:     potassium chloride ER (K-TAB) 20 MEQ tablet controlled-release ER tablet, Take 1 tablet by mouth., Disp: , Rfl:     prochlorperazine (COMPAZINE) 5 MG tablet, TAKE 1 TABLET BY MOUTH EVERY 6 HOURS AS NEEDED FOR NAUSEA OR VOMITING, Disp: 30 tablet, Rfl: 1    sacubitril-valsartan (ENTRESTO) 24-26 MG tablet, Take 1 tablet by mouth., Disp: , Rfl:     spironolactone (ALDACTONE) 25 MG tablet, Take 1 tablet by mouth Daily., Disp: , Rfl:     tiotropium bromide-olodaterol (Stiolto Respimat) 2.5-2.5 MCG/ACT aerosol solution inhaler, Inhale 2 puffs Daily., Disp: 3 each, Rfl: 3     Allergies     Allergies   Allergen Reactions    Propafenone Other (See Comments)     Drops B/p    Cephalexin Unknown - Low Severity     Mouth sores    Erythromycin Other (See Comments)     Stroke like    Farxiga [Dapagliflozin] Other (See Comments)     Patient states she got a yeast infection    Mercury Unknown - Low Severity    Metoclopramide Other (See Comments)     Decreased blood pressure      Moxifloxacin Hcl Unknown - Low Severity    Penicillins Unknown - Low Severity       Social History       Social History     Social History Narrative    Lives alone.         Objective       /65 (BP Location: Left arm, Patient Position: Sitting, Cuff Size: Adult)   Ht 172.2 cm (67.8\")   Wt 124 kg (274 lb)   BMI 41.91 kg/m²       Physical Exam  Constitutional:       General: She is not in acute distress.     Appearance: Normal appearance. She is well-developed and normal weight.   HENT:      Head: Normocephalic and atraumatic.   Eyes:      Conjunctiva/sclera: Conjunctivae normal.      Pupils: Pupils are equal, round, and reactive to light.      Visual Fields: Right eye visual fields normal and left eye visual fields normal.   Cardiovascular:      Rate and Rhythm: Normal rate. "   Pulmonary:      Effort: Pulmonary effort is normal. No respiratory distress or retractions.      Breath sounds: Normal air entry.   Abdominal:      General: There is no distension.      Tenderness: There is no abdominal tenderness.   Musculoskeletal:         General: Normal range of motion.      Right lower leg: No edema.      Left lower leg: No edema.   Skin:     General: Skin is warm and dry.      Findings: No lesion.   Neurological:      General: No focal deficit present.      Mental Status: She is alert and oriented to person, place, and time.   Psychiatric:         Mood and Affect: Mood and affect normal.         Behavior: Behavior normal.         Results       Result Review :    The following data was reviewed by: PRAVIN Jin on 01/09/2025:    CBC w/diff          4/23/2024    14:10 8/12/2024    12:50   CBC w/Diff   WBC 9.76  10.33       RBC 4.48  4.71       Hemoglobin 11.7  13.3       Hematocrit 36.3  40.5       MCV 81.0  86.0       MCH 26.1  28.2       MCHC 32.2  32.8       RDW 18.2  17.6       Platelets 369  310       Neutrophil Rel % 63.5  65.1       Immature Granulocyte Rel % 0.4  0.6       Lymphocyte Rel % 25.1  24.5       Monocyte Rel % 8.6  7.6       Eosinophil Rel % 1.9  1.7       Basophil Rel % 0.5  0.5          Details          This result is from an external source.             CMP          4/15/2024    13:19 4/23/2024    14:10 6/10/2024    15:28   CMP   Glucose 117  105     BUN 26  22     Creatinine 2.28  1.62     EGFR 22.2  33.4     Sodium 140  141     Potassium 5.2  4.4  4.6       Chloride 101  102     Calcium 9.7  9.7     BUN/Creatinine Ratio 11.4  13.6     Anion Gap 12.3  12.8        Details          This result is from an external source.                        Assessment and Plan              Diagnoses and all orders for this visit:    1. Gastroparesis (Primary)    2. Irritable bowel syndrome with both constipation and diarrhea    3. Gastroesophageal reflux disease,  unspecified whether esophagitis present  -     pantoprazole (PROTONIX) 40 MG EC tablet; Take 1 tablet by mouth Daily.  Dispense: 90 tablet; Refill: 2    4. Nausea  -     ondansetron ODT (ZOFRAN-ODT) 8 MG disintegrating tablet; Place 1 tablet on the tongue Every 8 (Eight) Hours As Needed for Nausea or Vomiting.  Dispense: 30 tablet; Refill: 1            Follow Up     Follow Up   Return in about 9 months (around 10/9/2025) for GERD and gastroparesis .  Patient was given instructions and counseling regarding her condition or for health maintenance advice. Please see specific information pulled into the AVS if appropriate.

## 2025-02-03 ENCOUNTER — HOSPITAL ENCOUNTER (OUTPATIENT)
Dept: CT IMAGING | Facility: HOSPITAL | Age: 75
Discharge: HOME | End: 2025-02-03
Payer: MEDICARE

## 2025-02-03 ENCOUNTER — HOSPITAL ENCOUNTER (OUTPATIENT)
Dept: BONE DENSITY | Facility: HOSPITAL | Age: 75
Discharge: HOME | End: 2025-02-03
Payer: MEDICARE

## 2025-02-03 DIAGNOSIS — Z78.0 MENOPAUSE: ICD-10-CM

## 2025-02-03 DIAGNOSIS — F17.211 NICOTINE DEPENDENCE, CIGARETTES, IN REMISSION: ICD-10-CM

## 2025-02-03 PROCEDURE — 71271 CT THORAX LUNG CANCER SCR C-: CPT

## 2025-02-03 PROCEDURE — 77080 DXA BONE DENSITY AXIAL: CPT

## 2025-05-09 NOTE — PROGRESS NOTES
"  [address] [address]  Phone: [phone]  Fax: [phone]      SLEEP CLINIC FOLLOW UP PROGRESS NOTE.    Nica Traylor  [id number]   1950  72 y.o.  female      PCP: Wilder Morales MD      Date of visit: 12/5/2022    Chief Complaint   Patient presents with   • Sleep Apnea   • Obesity       HPI:  This is a 72 y.o. years old patient is here for the management of obstructive sleep apnea.  Sleep apnea is mild in severity with a AHI of 14/hr. Patient is using positive airway pressure therapy with auto CPAP, but unfortunately she returned the CPAP because she could not use the CPAP she tried several masks up to 5 but she had significant nosebleeds.  She is on anticoagulation because of the atrial fibrillation and she recently CPAP.  Patient is here to discuss treatment options.  As the patient has significant comorbidities it is medically necessary to treat her sleep apnea.  I talked to the patient about oral appliance.      Medications and allergies are reviewed by me and documented in the encounter.     SOCIAL (habits pertaining to sleep medicine)  History tobacco use:No   History of alcohol use: 0 per week  Caffeine use: 2     REVIEW OF SYSTEMS:   Richey Sleepiness Scale :Total score: 0   Nasal congestion:No   Dry mouth/nose:No   Post nasal drip; No   Acid reflux/Heartburn:No   Abd bloating:No   Morning headache:No   Anxiety:No   Depression:No    PHYSICAL EXAMINATION:  CONSTITUTIONAL:  Vitals:    12/05/22 0900   BP: 126/76   Pulse: 68   SpO2: 95%   Weight: 119 kg (261 lb 8 oz)   Height: 172.7 cm (67.99\")    Body mass index is 39.77 kg/m².   NOSE: nasal passages are clear, No deformities noted   RESP SYSTEM: Not in any respiratory distress, no chest deformities noted,   CARDIOVASULAR: No edema noted  NEURO: Oriented x 3, gait normal,  Mood and affect appeared appropriate          ASSESSMENT AND PLAN:  · Obstructive sleep apnea ( G 47.33).  " Reviewed HTN with Dr Tee - he recommends adding hydrochlorothiazide 25 mg daily and get BMP before office visit 5/15/2025  Called pt and left VM with this information  Recommend BMP on 5/12/25 or 5/13/2025     She was unable to use the CPAP due to recurrent nosebleeds even though she has tried about 5 masks.  She has atrial fibrillation and hypertension.  It is medically necessary to treat her sleep apnea because of comorbidities I have sent her to see Dr. Chaudhry to do a oral mandibular advancement device.  After the device is made she will have a repeat home sleep test to see the adequacy of the treatment.  · Obesity  2 with BMI is Body mass index is 39.77 kg/m².. I have discuss the relationship between the weight and sleep apnea. The benefit of weight loss in reducing severity of sleep apnea was discussed. Discussed diet and exercise with the patient to achieve ideal BMI.   · Return for Follow up after study after MAD device has been. . Patient's questions were answered.      Arthur Hernandez MD  Sleep Medicine.  Medical Director, [facility]  12/5/2022 ,

## 2025-05-21 ENCOUNTER — TELEPHONE (OUTPATIENT)
Dept: GASTROENTEROLOGY | Facility: CLINIC | Age: 75
End: 2025-05-21
Payer: MEDICARE

## 2025-05-21 RX ORDER — HYDROCORTISONE 25 MG/G
CREAM TOPICAL 2 TIMES DAILY
Qty: 1 EACH | Refills: 2 | Status: SHIPPED | OUTPATIENT
Start: 2025-05-21 | End: 2025-06-04

## 2025-05-21 NOTE — TELEPHONE ENCOUNTER
"Pt reports \"sometimes it is a lot of blood, sometimes it is not too much\" she states this happens eveyrtime she uses the restroom she was going to report this at office visit on 1/9/2025 but forgot. She states she takes a stool softener and miralax to have a Bm she reports without these she cannot have a BM. She states her stool is soft, sometimes watery.     She states sometimes the blood drips onto her underwear as if she was on her period.     She denies trying any medications or OTC remedies but she has tried Tucks pads. She states this does not work.     She states she has a BM every 3-4 days.   "

## 2025-05-21 NOTE — TELEPHONE ENCOUNTER
Hub staff attempted to follow warm transfer process and was unsuccessful     Caller: Nica Traylor    Relationship to patient: Self    Best call back number: [phone]    Patient is needing: PT IS CALLING TO SPEAK WITH GHISLAINE TRAORE. PLEASE GIVE PT A CALL BACK. PT IS CALLING BECAUSE SHE HAS BLOOD IN HER STOOL. PT IS TRYING TO SEE WHAT SHE NEEDS TO DO. PLEASE GIVE PT A CALL BACK AND IF NOT ABLE TO REACH PT. IT IS OKAY TO [facility].

## 2025-05-21 NOTE — TELEPHONE ENCOUNTER
Looks like at her visit she felt the bleeding was related to hemorrhoids.  I can send her in some anusol for the hemorrhoids.  If she doesn't feel that it is related to hemorrhoids, then I would recommend she be scheduled for colonoscopy.

## 2025-06-05 ENCOUNTER — TRANSCRIBE ORDERS (OUTPATIENT)
Dept: ADMINISTRATIVE | Facility: HOSPITAL | Age: 75
End: 2025-06-05
Payer: MEDICARE

## 2025-06-05 ENCOUNTER — TELEPHONE (OUTPATIENT)
Dept: GASTROENTEROLOGY | Facility: CLINIC | Age: 75
End: 2025-06-05
Payer: MEDICARE

## 2025-06-05 ENCOUNTER — PREP FOR SURGERY (OUTPATIENT)
Dept: OTHER | Facility: HOSPITAL | Age: 75
End: 2025-06-05
Payer: MEDICARE

## 2025-06-05 DIAGNOSIS — R94.2 ABNORMAL PET OF RIGHT LUNG: Primary | ICD-10-CM

## 2025-06-05 DIAGNOSIS — K62.5 RECTAL BLEEDING: Primary | ICD-10-CM

## 2025-06-05 NOTE — TELEPHONE ENCOUNTER
2025    Dear ,      Patient Name: Nica Traylor  : 1950      This patient is waiting to have a Colonoscopy and/or Esophagogastroduodenoscopy which I will perform at [facility] on ____2025______________________. Please respond to this request noting your recommendations regarding clearance from a Pulmonary  standpoint.  You may contact our office at [phone] Option 3 with any questions. I appreciate your prompt response in this matter. Please return this form to our office as soon as possible to Anay ROBINS MA.    ____ I approve my patient from a Pulmonary  standpoint    ____ I do NOT approve my patient from a Pulmonary  standpoint at this time    Please inform our office if the patient requires additional follow-up from your office prior to scheduled procedure date.      Please specify clearance expiration date:____________________________________      Approving physician name (please print): _____________________________________________      Approving physician signature: ________________________________ Date:________________  Sincerely,  [facility] - Gastroenterology   Dr. Elisa Dior MD           Please fax approval or denial to our office as soon as possible.

## 2025-06-06 PROBLEM — K62.5 RECTAL BLEEDING: Status: ACTIVE | Noted: 2025-06-05

## 2025-06-06 NOTE — PROGRESS NOTES
Primary Care Provider  Wilder Morales MD     Referring Provider  No ref. provider found       Patient or patient representative verbalized consent for the use of Ambient Listening during the visit with  PRAVIN Coker for chart documentation. 6/10/2025  11:55 EDT    Chief Complaint  Emphysema, Shortness of Breath (With exertion ), Cough (Yellow mucus ), and Follow-up (6 month f/up - pulm clearance Dr. Dior colonoscopy 7/31 ; patient had PET done at [facility] 5/2)    Subjective          Nica Traylor presents to [facility]MON[facility]INE  History of Present Illness  Nica Traylor is a 74 y.o. female patient of Dr. Reynolds here for management of centrilobular emphysema, tobacco abuse of cigarettes in remission, obstructive sleep apnea under the care of sleep medicine and dyspnea on exertion.    History of Present Illness  The patient is a 74-year-old female who presents for evaluation of a lung infection.    She reports a persistent cough accompanied by the expectoration of thick, yellow phlegm. She has not been prescribed any antibiotics for this condition. A CT scan has been ordered by her primary care physician, which is scheduled for today at 4:00 PM at [facility]. She also mentions that a recent PET scan of her heart revealed an abnormality in her lungs. During her last visit to her primary care physician, she experienced significant discomfort, including low blood pressure and shortness of breath. She has a sufficient supply of albuterol.    She has had some bleeding from her rectum, which she thinks is due to hemorrhoids. They wanted to do a colonoscopy on her, and she needs to get approval from us and her heart specialist. A PET test was done on her heart because they thought she had a blockage.    MEDICATIONS  Current: Albuterol, Stiolto.       Her history of smoking is   Tobacco Use: Medium Risk (6/10/2025)    Patient History     Smoking Tobacco  Use: Former     Smokeless Tobacco Use: Never     Passive Exposure: Never   .    Review of Systems   Constitutional:  Negative for chills, fatigue, fever, unexpected weight gain and unexpected weight loss.   HENT:  Congestion: Nasal.    Respiratory:  Positive for cough and shortness of breath. Negative for apnea and wheezing.         Negative for Hemoptysis     Cardiovascular:  Negative for chest pain, palpitations and leg swelling.   Skin:         Negative for cyanosis      Sleep: Negative for Excessive daytime sleepiness  Negative for morning headaches  Negative for Snoring    Family History   Problem Relation Age of Onset    Stroke Mother     Heart disease Mother     Diabetes Mother     Pancreatic cancer Mother     Heart disease Father     Heart disease Sister     Cervical cancer Sister     Uterine cancer Sister     Heart disease Brother     Stroke Other         Maternal grandparent/Paternal grandparent    Heart disease Other         Maternal grandparent/Paternal grandparent    Stroke Maternal Aunt     Heart disease Maternal Aunt         Social History     Socioeconomic History    Marital status:    Tobacco Use    Smoking status: Former     Current packs/day: 0.00     Average packs/day: 0.8 packs/day for 25.0 years (20.0 ttl pk-yrs)     Types: Cigarettes     Start date:      Quit date: 2016     Years since quittin.4     Passive exposure: Never    Smokeless tobacco: Never   Vaping Use    Vaping status: Never Used   Substance and Sexual Activity    Alcohol use: Never     Comment: Does not drink    Drug use: Never    Sexual activity: Defer        Past Medical History:   Diagnosis Date    Asthma     Burning mouth syndrome     COPD (chronic obstructive pulmonary disease)     Depression     Diastolic CHF 2021    Essential hypertension 2012    GERD (gastroesophageal reflux disease)     Hyperlipidemia     Oral lesion     Paroxysmal atrial fibrillation 2021        Immunization History    Administered Date(s) Administered    COVID-19 (MODERNA) 1st,2nd,3rd Dose Monovalent 03/02/2021, 04/02/2021, 05/02/2021, 12/09/2021    Flu Vaccine Quad PF >36MO 09/25/2014    Flu Vaccine Split Quad 11/22/2005, 10/29/2008, 09/10/2010, 11/10/2011, 09/25/2014    Fluad Quad 65+ 09/14/2020    Fluzone (or Fluarix & Flulaval for VFC) >6mos 09/25/2014    Fluzone High-Dose 65+YRS 12/01/2015, 10/24/2016, 09/26/2017, 09/28/2018, 10/11/2019, 09/16/2024    Fluzone High-Dose 65+yrs 10/09/2021, 09/27/2022, 10/26/2023    Influenza TIV (IM) 11/22/2005, 10/29/2008, 09/10/2010, 11/10/2011    Influenza, Unspecified 09/25/2014, 10/09/2021, 09/27/2022    Pneumococcal Conjugate 13-Valent (PCV13) 12/01/2015    Pneumococcal Polysaccharide (PPSV23) 01/26/2017    Shingrix 07/07/2020, 09/23/2020    Tdap 07/15/2013         Allergies   Allergen Reactions    Propafenone Other (See Comments)     Drops B/p    Cephalexin Unknown - Low Severity     Mouth sores    Erythromycin Other (See Comments)     Stroke like    Farxiga [Dapagliflozin] Other (See Comments)     Patient states she got a yeast infection    Mercury Unknown - Low Severity    Metoclopramide Other (See Comments)     Decreased blood pressure      Moxifloxacin Hcl Unknown - Low Severity    Penicillins Unknown - Low Severity          Current Outpatient Medications:     acetaminophen (TYLENOL) 500 MG tablet, Take 1 tablet by mouth., Disp: , Rfl:     albuterol sulfate  (90 Base) MCG/ACT inhaler, Inhale 2 puffs., Disp: , Rfl:     allopurinol (ZYLOPRIM) 300 MG tablet, Take 1 tablet by mouth Daily., Disp: , Rfl:     atorvastatin (LIPITOR) 80 MG tablet, Take 1 tablet by mouth Daily., Disp: , Rfl:     baclofen (LIORESAL) 10 MG tablet, Take 1 tablet by mouth 3 (Three) Times a Day., Disp: , Rfl:     Cholecalciferol (VITAMIN D3 PO), Take 2,000 Units by mouth Daily., Disp: , Rfl:     citalopram (CeleXA) 20 MG tablet, Take 1 tablet by mouth Daily., Disp: , Rfl:     diazePAM (VALIUM) 2 MG  tablet, Take 1 tablet by mouth Every 6 (Six) Hours As Needed (As needed for vertigo)., Disp: 10 tablet, Rfl: 0    diphenhydrAMINE (BENADRYL) 25 MG tablet, Take 1 tablet by mouth Every 6 (Six) Hours As Needed., Disp: , Rfl:     Eliquis 5 MG tablet tablet, TAKE 1 TABLET BY MOUTH TWICE A DAY, Disp: 180 tablet, Rfl: 3    estradiol (ESTRACE) 0.1 MG/GM vaginal cream, estradiol 0.01% (0.1 mg/gram) vaginal cream, Disp: , Rfl:     flecainide (TAMBOCOR) 50 MG tablet, Take 1 tablet by mouth 2 (Two) Times a Day., Disp: , Rfl:     furosemide (LASIX) 40 MG tablet, Take 0.5 tablets by mouth., Disp: , Rfl:     gabapentin (NEURONTIN) 300 MG capsule, Take 1 capsule by mouth Daily., Disp: , Rfl:     HYDROcodone-acetaminophen (NORCO)  MG per tablet, hydrocodone 10 mg-acetaminophen 325 mg tablet, Disp: , Rfl:     Hydrocortisone, Perianal, (ANUSOL-HC) 2.5 % rectal cream, Insert  into the rectum., Disp: , Rfl:     meclizine (ANTIVERT) 25 MG tablet, Take 1 tablet by mouth 4 (Four) Times a Day As Needed for Dizziness. (Patient taking differently: Take 1 tablet by mouth 2 (Two) Times a Day.), Disp: 60 tablet, Rfl: 0    metoprolol succinate XL (TOPROL-XL) 25 MG 24 hr tablet, TAKE 1/2 TABLET BY MOUTH EVERY DAY, Disp: 45 tablet, Rfl: 3    mirtazapine (REMERON) 7.5 MG half tablet, Take 2 half tablet by mouth Every Night., Disp: , Rfl:     multivitamin (THERAGRAN) tablet tablet, Take  by mouth., Disp: , Rfl:     mupirocin (BACTROBAN) 2 % ointment, , Disp: , Rfl:     ondansetron ODT (ZOFRAN-ODT) 8 MG disintegrating tablet, Place 1 tablet on the tongue Every 8 (Eight) Hours As Needed for Nausea or Vomiting., Disp: 30 tablet, Rfl: 1    pantoprazole (PROTONIX) 40 MG EC tablet, Take 1 tablet by mouth Daily., Disp: 90 tablet, Rfl: 2    polyethylene glycol (MIRALAX) 17 g packet, Take 17 g by mouth Daily., Disp: , Rfl:     potassium chloride ER (K-TAB) 20 MEQ tablet controlled-release ER tablet, Take 1 tablet by mouth., Disp: , Rfl:      prochlorperazine (COMPAZINE) 5 MG tablet, TAKE 1 TABLET BY MOUTH EVERY 6 HOURS AS NEEDED FOR NAUSEA OR VOMITING, Disp: 30 tablet, Rfl: 1    sacubitril-valsartan (ENTRESTO) 24-26 MG tablet, Take 1 tablet by mouth., Disp: , Rfl:     spironolactone (ALDACTONE) 25 MG tablet, Take 1 tablet by mouth Daily., Disp: , Rfl:     tiotropium bromide-olodaterol (Stiolto Respimat) 2.5-2.5 MCG/ACT aerosol solution inhaler, Inhale 2 puffs Daily., Disp: 3 each, Rfl: 3    topiramate (TOPAMAX) 50 MG tablet, Take 1 tablet by mouth Daily., Disp: , Rfl:     azithromycin (ZITHROMAX) 250 MG tablet, Take 2 by mouth today then 1 daily for 4 days, Disp: 6 tablet, Rfl: 0    levalbuterol (XOPENEX HFA) 45 MCG/ACT inhaler, INHALE 2 PUFFS BY MOUTH FOUR TIMES DAILY AS NEEDED FOR WHEEZING (Patient not taking: Reported on 6/10/2025), Disp: 1 each, Rfl: 11     Objective   Physical Exam  Constitutional:       General: She is not in acute distress.     Appearance: Normal appearance. She is overweight.   HENT:      Right Ear: Hearing normal.      Left Ear: Hearing normal.      Nose: No nasal tenderness or congestion.      Mouth/Throat:      Mouth: Mucous membranes are moist. No oral lesions.   Eyes:      Extraocular Movements: Extraocular movements intact.      Pupils: Pupils are equal, round, and reactive to light.   Cardiovascular:      Rate and Rhythm: Normal rate and regular rhythm.      Pulses: Normal pulses.      Heart sounds: Normal heart sounds. No murmur heard.  Pulmonary:      Effort: Pulmonary effort is normal.      Breath sounds: Normal breath sounds. No wheezing, rhonchi or rales.   Musculoskeletal:      Right lower leg: No edema.      Left lower leg: No edema.   Skin:     General: Skin is warm and dry.      Findings: No lesion or rash.   Neurological:      General: No focal deficit present.      Mental Status: She is alert and oriented to person, place, and time.   Psychiatric:         Mood and Affect: Affect normal. Mood is not anxious or  "depressed.         Vital Signs:   /74 (BP Location: Right arm, Patient Position: Sitting, Cuff Size: Large Adult)   Pulse 68   Temp 97.6 °F (36.4 °C) (Oral)   Resp 16   Ht 170.2 cm (67\")   Wt 121 kg (267 lb)   SpO2 96% Comment: RA; 2 L's HS  BMI 41.82 kg/m²        Result Review :   The following data was reviewed by: PRAVIN Coker on 06/10/2025:    CBC w/diff          8/12/2024    12:50   CBC w/Diff   WBC 10.33       RBC 4.71       Hemoglobin 13.3       Hematocrit 40.5       MCV 86.0       MCH 28.2       MCHC 32.8       RDW 17.6       Platelets 310       Neutrophil Rel % 65.1       Immature Granulocyte Rel % 0.6       Lymphocyte Rel % 24.5       Monocyte Rel % 7.6       Eosinophil Rel % 1.7       Basophil Rel % 0.5          Details          This result is from an external source.             Data reviewed : Radiologic studies chest xray 8/12/2024, PFT 1/11/2021 and my last office note   Procedures        Assessment and Plan    Diagnoses and all orders for this visit:    1. Centrilobular emphysema (Primary)  Comments:  continue Stiolto  Orders:  -     tiotropium bromide-olodaterol (Stiolto Respimat) 2.5-2.5 MCG/ACT aerosol solution inhaler; Inhale 2 puffs Daily.  Dispense: 3 each; Refill: 3  -     azithromycin (ZITHROMAX) 250 MG tablet; Take 2 by mouth today then 1 daily for 4 days  Dispense: 6 tablet; Refill: 0    2. CRISTINO (obstructive sleep apnea)  Comments:  continue CPAP    3. Dyspnea on exertion  Comments:  albuterol as needed    4. Nicotine dependence, cigarettes, in remission    5. Class 3 severe obesity with serious comorbidity and body mass index (BMI) of 40.0 to 44.9 in adult, unspecified obesity type    6. Preoperative clearance  Comments:  Cleared for colonoscopy with Dr. Dior        Assessment & Plan  1. Productive cough  She reports coughing up thick, yellow phlegm and feeling very unwell last week, with low blood pressure and shortness of breath. A CT scan has been ordered by her " family doctor and is scheduled for today at 4:00 PM at [facility]. The results will be reviewed once available. A prescription for azithromycin (Z-Cody) has been sent to her pharmacy. Her Stiolto prescription has also been renewed.    2. Rectal bleeding.  She reports rectal bleeding, which she believes is due to hemorrhoids. A colonoscopy has been recommended, and she needs clearance from both her primary care physician and her cardiologist. A message will be sent to Danielle to proceed with the colonoscopy.          Follow Up   Return in about 6 months (around 12/10/2025) for Recheck.  Patient was given instructions and counseling regarding her condition or for health maintenance advice. Please see specific information pulled into the AVS if appropriate.

## 2025-06-10 ENCOUNTER — OFFICE VISIT (OUTPATIENT)
Dept: PULMONOLOGY | Facility: CLINIC | Age: 75
End: 2025-06-10
Payer: MEDICARE

## 2025-06-10 ENCOUNTER — HOSPITAL ENCOUNTER (OUTPATIENT)
Dept: CT IMAGING | Facility: HOSPITAL | Age: 75
Discharge: HOME | End: 2025-06-10
Admitting: FAMILY MEDICINE
Payer: MEDICARE

## 2025-06-10 VITALS
RESPIRATION RATE: 16 BRPM | OXYGEN SATURATION: 96 % | HEART RATE: 68 BPM | DIASTOLIC BLOOD PRESSURE: 74 MMHG | WEIGHT: 267 LBS | BODY MASS INDEX: 41.91 KG/M2 | TEMPERATURE: 97.6 F | HEIGHT: 67 IN | SYSTOLIC BLOOD PRESSURE: 116 MMHG

## 2025-06-10 DIAGNOSIS — J43.2 CENTRILOBULAR EMPHYSEMA: Primary | Chronic | ICD-10-CM

## 2025-06-10 DIAGNOSIS — F17.211 NICOTINE DEPENDENCE, CIGARETTES, IN REMISSION: ICD-10-CM

## 2025-06-10 DIAGNOSIS — E66.01 CLASS 3 SEVERE OBESITY WITH SERIOUS COMORBIDITY AND BODY MASS INDEX (BMI) OF 40.0 TO 44.9 IN ADULT, UNSPECIFIED OBESITY TYPE: ICD-10-CM

## 2025-06-10 DIAGNOSIS — Z01.818 PREOPERATIVE CLEARANCE: ICD-10-CM

## 2025-06-10 DIAGNOSIS — R06.09 DYSPNEA ON EXERTION: ICD-10-CM

## 2025-06-10 DIAGNOSIS — R94.2 ABNORMAL PET OF RIGHT LUNG: ICD-10-CM

## 2025-06-10 DIAGNOSIS — G47.33 OSA (OBSTRUCTIVE SLEEP APNEA): ICD-10-CM

## 2025-06-10 DIAGNOSIS — E66.813 CLASS 3 SEVERE OBESITY WITH SERIOUS COMORBIDITY AND BODY MASS INDEX (BMI) OF 40.0 TO 44.9 IN ADULT, UNSPECIFIED OBESITY TYPE: ICD-10-CM

## 2025-06-10 PROCEDURE — 71250 CT THORAX DX C-: CPT

## 2025-06-10 RX ORDER — MUPIROCIN 20 MG/G
OINTMENT TOPICAL
COMMUNITY
Start: 2025-05-04

## 2025-06-10 RX ORDER — TOPIRAMATE 50 MG/1
1 TABLET, FILM COATED ORAL DAILY
COMMUNITY
Start: 2025-06-02

## 2025-06-10 RX ORDER — AZITHROMYCIN 250 MG/1
TABLET, FILM COATED ORAL
Qty: 6 TABLET | Refills: 0 | Status: SHIPPED | OUTPATIENT
Start: 2025-06-10

## 2025-06-10 RX ORDER — TIOTROPIUM BROMIDE AND OLODATEROL 3.124; 2.736 UG/1; UG/1
2 SPRAY, METERED RESPIRATORY (INHALATION)
Qty: 3 EACH | Refills: 3 | Status: SHIPPED | OUTPATIENT
Start: 2025-06-10

## 2025-06-10 RX ORDER — ALBUTEROL SULFATE 90 UG/1
2 INHALANT RESPIRATORY (INHALATION)
COMMUNITY

## 2025-06-10 RX ORDER — DOXYCYCLINE HYCLATE 100 MG
1 TABLET ORAL EVERY 12 HOURS SCHEDULED
COMMUNITY
Start: 2025-02-27 | End: 2025-06-10

## 2025-06-10 RX ORDER — HYDROCORTISONE 25 MG/G
CREAM TOPICAL
COMMUNITY
Start: 2025-05-21

## 2025-06-11 ENCOUNTER — TELEPHONE (OUTPATIENT)
Dept: PULMONOLOGY | Facility: CLINIC | Age: 75
End: 2025-06-11

## 2025-06-11 DIAGNOSIS — J43.2 CENTRILOBULAR EMPHYSEMA: Primary | ICD-10-CM

## 2025-06-11 RX ORDER — GLYCOPYRROLATE AND FORMOTEROL FUMARATE 9; 4.8 UG/1; UG/1
2 AEROSOL, METERED RESPIRATORY (INHALATION)
Qty: 1 EACH | Refills: 5 | Status: SHIPPED | OUTPATIENT
Start: 2025-06-11

## 2025-06-11 NOTE — TELEPHONE ENCOUNTER
Caller: Nica Traylor    Relationship to patient: Self    Best call back number: [phone]     Patient is needing: PT RX FOR STIOLTO PROHIBITIVELY EXPENSIVE, PT REQUESTS NON-POWDERED ALTERNATIVE. PLEASE CALL TO ADVISE.

## 2025-06-12 ENCOUNTER — TELEPHONE (OUTPATIENT)
Dept: GASTROENTEROLOGY | Facility: CLINIC | Age: 75
End: 2025-06-12
Payer: MEDICARE

## 2025-06-12 ENCOUNTER — TELEPHONE (OUTPATIENT)
Dept: PULMONOLOGY | Facility: CLINIC | Age: 75
End: 2025-06-12
Payer: MEDICARE

## 2025-06-12 NOTE — TELEPHONE ENCOUNTER
ENDO RECONCILIATION  Verify source of procedure(s): Other  If other, please list source: TE 5/21/25    TIME OUT-CONFIRM CORRECT PROCEDURE: Colonoscopy  Cardiology: Chris Manzo MD  Pulmonology: PRAVIN Coker  Blood thinner: eliquis  GLP-1:  Additional DX/indication for procedure:    Please include any other notes relevant to endo reconciliation:     Pulmonary clearance received from PRAVIN Coker on 6/10/25 (TE 6/5) expires in 60 days.

## 2025-06-12 NOTE — TELEPHONE ENCOUNTER
"Chief Complaint   Patient presents with     Ear Problem       Initial Pulse 109  Temp(Src) 99.8  F (37.7  C) (Tympanic)  Resp 20  SpO2 98% Estimated body mass index is 19.94 kg/(m^2) as calculated from the following:    Height as of 11/18/16: 2' 6.51\" (0.775 m).    Weight as of 1/30/17: 26 lb 6.4 oz (11.975 kg).  BP completed using cuff size: NA (Not Taken)  " ERROR

## 2025-06-16 RX ORDER — SODIUM, POTASSIUM,MAG SULFATES 17.5-3.13G
1 SOLUTION, RECONSTITUTED, ORAL ORAL EVERY 12 HOURS
Qty: 354 ML | Refills: 0 | Status: SHIPPED | OUTPATIENT
Start: 2025-06-16

## 2025-07-02 ENCOUNTER — HOSPITAL ENCOUNTER (EMERGENCY)
Facility: HOSPITAL | Age: 75
Discharge: HOME | End: 2025-07-02
Attending: EMERGENCY MEDICINE | Admitting: EMERGENCY MEDICINE
Payer: MEDICARE

## 2025-07-02 ENCOUNTER — APPOINTMENT (OUTPATIENT)
Dept: GENERAL RADIOLOGY | Facility: HOSPITAL | Age: 75
End: 2025-07-02
Payer: MEDICARE

## 2025-07-02 VITALS
RESPIRATION RATE: 18 BRPM | TEMPERATURE: 97.5 F | DIASTOLIC BLOOD PRESSURE: 80 MMHG | BODY MASS INDEX: 40.03 KG/M2 | OXYGEN SATURATION: 94 % | HEIGHT: 68 IN | WEIGHT: 264.11 LBS | SYSTOLIC BLOOD PRESSURE: 140 MMHG | HEART RATE: 60 BPM

## 2025-07-02 DIAGNOSIS — R07.9 CHEST PAIN, UNSPECIFIED TYPE: Primary | ICD-10-CM

## 2025-07-02 DIAGNOSIS — F41.9 ANXIETY: ICD-10-CM

## 2025-07-02 LAB
ALBUMIN SERPL-MCNC: 4.9 G/DL (ref 3.5–5.2)
ALBUMIN/GLOB SERPL: 2 G/DL
ALP SERPL-CCNC: 99 U/L (ref 39–117)
ALT SERPL W P-5'-P-CCNC: 17 U/L (ref 1–33)
ANION GAP SERPL CALCULATED.3IONS-SCNC: 11.4 MMOL/L (ref 5–15)
AST SERPL-CCNC: 19 U/L (ref 1–32)
BASOPHILS # BLD AUTO: 0.05 10*3/MM3 (ref 0–0.2)
BASOPHILS NFR BLD AUTO: 0.6 % (ref 0–1.5)
BILIRUB SERPL-MCNC: 0.7 MG/DL (ref 0–1.2)
BUN SERPL-MCNC: 15.7 MG/DL (ref 8–23)
BUN/CREAT SERPL: 12.6 (ref 7–25)
CALCIUM SPEC-SCNC: 9.6 MG/DL (ref 8.6–10.5)
CHLORIDE SERPL-SCNC: 103 MMOL/L (ref 98–107)
CO2 SERPL-SCNC: 22.6 MMOL/L (ref 22–29)
CREAT SERPL-MCNC: 1.25 MG/DL (ref 0.57–1)
DEPRECATED RDW RBC AUTO: 48.9 FL (ref 37–54)
EGFRCR SERPLBLD CKD-EPI 2021: 45.3 ML/MIN/1.73
EOSINOPHIL # BLD AUTO: 0.15 10*3/MM3 (ref 0–0.4)
EOSINOPHIL NFR BLD AUTO: 1.8 % (ref 0.3–6.2)
ERYTHROCYTE [DISTWIDTH] IN BLOOD BY AUTOMATED COUNT: 14.5 % (ref 12.3–15.4)
GEN 5 1HR TROPONIN T REFLEX: 7 NG/L
GLOBULIN UR ELPH-MCNC: 2.4 GM/DL
GLUCOSE SERPL-MCNC: 101 MG/DL (ref 65–99)
HCT VFR BLD AUTO: 42.7 % (ref 34–46.6)
HGB BLD-MCNC: 14.6 G/DL (ref 12–15.9)
HOLD SPECIMEN: NORMAL
HOLD SPECIMEN: NORMAL
IMM GRANULOCYTES # BLD AUTO: 0.01 10*3/MM3 (ref 0–0.05)
IMM GRANULOCYTES NFR BLD AUTO: 0.1 % (ref 0–0.5)
LIPASE SERPL-CCNC: 36 U/L (ref 13–60)
LYMPHOCYTES # BLD AUTO: 2.34 10*3/MM3 (ref 0.7–3.1)
LYMPHOCYTES NFR BLD AUTO: 27.6 % (ref 19.6–45.3)
MAGNESIUM SERPL-MCNC: 2.3 MG/DL (ref 1.6–2.4)
MCH RBC QN AUTO: 31.9 PG (ref 26.6–33)
MCHC RBC AUTO-ENTMCNC: 34.2 G/DL (ref 31.5–35.7)
MCV RBC AUTO: 93.4 FL (ref 79–97)
MONOCYTES # BLD AUTO: 0.61 10*3/MM3 (ref 0.1–0.9)
MONOCYTES NFR BLD AUTO: 7.2 % (ref 5–12)
NEUTROPHILS NFR BLD AUTO: 5.32 10*3/MM3 (ref 1.7–7)
NEUTROPHILS NFR BLD AUTO: 62.7 % (ref 42.7–76)
NRBC BLD AUTO-RTO: 0 /100 WBC (ref 0–0.2)
NT-PROBNP SERPL-MCNC: 44.5 PG/ML (ref 0–900)
PLATELET # BLD AUTO: 281 10*3/MM3 (ref 140–450)
PMV BLD AUTO: 10.5 FL (ref 6–12)
POTASSIUM SERPL-SCNC: 4.4 MMOL/L (ref 3.5–5.2)
PROT SERPL-MCNC: 7.3 G/DL (ref 6–8.5)
RBC # BLD AUTO: 4.57 10*6/MM3 (ref 3.77–5.28)
SODIUM SERPL-SCNC: 137 MMOL/L (ref 136–145)
TROPONIN T NUMERIC DELTA: 0 NG/L
TROPONIN T SERPL HS-MCNC: 7 NG/L
WBC NRBC COR # BLD AUTO: 8.48 10*3/MM3 (ref 3.4–10.8)
WHOLE BLOOD HOLD COAG: NORMAL
WHOLE BLOOD HOLD SPECIMEN: NORMAL

## 2025-07-02 PROCEDURE — 83690 ASSAY OF LIPASE: CPT

## 2025-07-02 PROCEDURE — 84484 ASSAY OF TROPONIN QUANT: CPT

## 2025-07-02 PROCEDURE — 36415 COLL VENOUS BLD VENIPUNCTURE: CPT

## 2025-07-02 PROCEDURE — 83880 ASSAY OF NATRIURETIC PEPTIDE: CPT

## 2025-07-02 PROCEDURE — 80053 COMPREHEN METABOLIC PANEL: CPT

## 2025-07-02 PROCEDURE — 93005 ELECTROCARDIOGRAM TRACING: CPT

## 2025-07-02 PROCEDURE — 93005 ELECTROCARDIOGRAM TRACING: CPT | Performed by: EMERGENCY MEDICINE

## 2025-07-02 PROCEDURE — 83735 ASSAY OF MAGNESIUM: CPT

## 2025-07-02 PROCEDURE — 99284 EMERGENCY DEPT VISIT MOD MDM: CPT

## 2025-07-02 PROCEDURE — 71045 X-RAY EXAM CHEST 1 VIEW: CPT

## 2025-07-02 PROCEDURE — 85025 COMPLETE CBC W/AUTO DIFF WBC: CPT

## 2025-07-02 PROCEDURE — 84484 ASSAY OF TROPONIN QUANT: CPT | Performed by: EMERGENCY MEDICINE

## 2025-07-02 PROCEDURE — 93010 ELECTROCARDIOGRAM REPORT: CPT | Performed by: SPECIALIST

## 2025-07-02 RX ORDER — SODIUM CHLORIDE 0.9 % (FLUSH) 0.9 %
10 SYRINGE (ML) INJECTION AS NEEDED
Status: DISCONTINUED | OUTPATIENT
Start: 2025-07-02 | End: 2025-07-02 | Stop reason: HOSPADM

## 2025-07-02 RX ORDER — ASPIRIN 81 MG/1
324 TABLET, CHEWABLE ORAL ONCE
Status: DISCONTINUED | OUTPATIENT
Start: 2025-07-02 | End: 2025-07-02 | Stop reason: HOSPADM

## 2025-07-02 NOTE — ED PROVIDER NOTES
Time: 3:02 PM EDT  Date of encounter:  7/2/2025  Independent Historian/Clinical History and Information was obtained by:   Patient and Family    History is limited by: N/A    Chief Complaint   Patient presents with    Chest Pain         History of Present Illness:  Patient is a 74 y.o. year old female who presents to the emergency department accompanied by family for evaluation of multiple short episodes of right-sided chest pain that radiates into the right shoulder and up into the neck.  The patient reports the episodes last about 5 or 6 seconds.  She reports that she had at least 10 of these episodes since they first started today.  She also has increased belching.  No shortness of breath, nausea or dizziness.  ASA per EMS PTA.    Patient Care Team  Primary Care Provider: Wilder Morales MD    Past Medical History:     Allergies   Allergen Reactions    Propafenone Other (See Comments)     Drops B/p    Cephalexin Unknown - Low Severity     Mouth sores    Erythromycin Other (See Comments)     Stroke like    Farxiga [Dapagliflozin] Other (See Comments)     Patient states she got a yeast infection    Mercury Unknown - Low Severity    Metoclopramide Other (See Comments)     Decreased blood pressure      Moxifloxacin Hcl Unknown - Low Severity    Penicillins Unknown - Low Severity     Past Medical History:   Diagnosis Date    Asthma     Burning mouth syndrome     COPD (chronic obstructive pulmonary disease)     Depression     Diastolic CHF 6/24/2021    Essential hypertension 6/20/2012    GERD (gastroesophageal reflux disease)     Hyperlipidemia     Oral lesion     Paroxysmal atrial fibrillation 05/24/2021     Past Surgical History:   Procedure Laterality Date    BLADDER SURGERY      CARPAL TUNNEL RELEASE      COLONOSCOPY      ENDOSCOPY  2020    GALLBLADDER SURGERY  2003    HYSTERECTOMY      PACEMAKER IMPLANTATION      UPPER GASTROINTESTINAL ENDOSCOPY       Family History   Problem Relation Age of Onset    Stroke  Mother     Heart disease Mother     Diabetes Mother     Pancreatic cancer Mother     Heart disease Father     Heart disease Sister     Cervical cancer Sister     Uterine cancer Sister     Heart disease Brother     Stroke Other         Maternal grandparent/Paternal grandparent    Heart disease Other         Maternal grandparent/Paternal grandparent    Stroke Maternal Aunt     Heart disease Maternal Aunt        Home Medications:  Prior to Admission medications    Medication Sig Start Date End Date Taking? Authorizing Provider   acetaminophen (TYLENOL) 500 MG tablet Take 1 tablet by mouth.    Rita Valadez MD   albuterol sulfate  (90 Base) MCG/ACT inhaler Inhale 2 puffs.    Rita Valadez MD   allopurinol (ZYLOPRIM) 300 MG tablet Take 1 tablet by mouth Daily.    Rita Valadez MD   atorvastatin (LIPITOR) 80 MG tablet Take 1 tablet by mouth Daily.    Rita Valadez MD   azithromycin (ZITHROMAX) 250 MG tablet Take 2 by mouth today then 1 daily for 4 days 6/10/25   Neris Valencia APRN   baclofen (LIORESAL) 10 MG tablet Take 1 tablet by mouth 3 (Three) Times a Day.    Rita Valadez MD   Cholecalciferol (VITAMIN D3 PO) Take 2,000 Units by mouth Daily.    Rita Valadez MD   citalopram (CeleXA) 20 MG tablet Take 1 tablet by mouth Daily.    Rita Valadez MD   diazePAM (VALIUM) 2 MG tablet Take 1 tablet by mouth Every 6 (Six) Hours As Needed (As needed for vertigo). 2/17/23   Jermaine Bar DO   diphenhydrAMINE (BENADRYL) 25 MG tablet Take 1 tablet by mouth Every 6 (Six) Hours As Needed.    Rita Valadez MD   Eliquis 5 MG tablet tablet TAKE 1 TABLET BY MOUTH TWICE A DAY 3/30/22   Chris Manzo MD   estradiol (ESTRACE) 0.1 MG/GM vaginal cream estradiol 0.01% (0.1 mg/gram) vaginal cream    Rita Valadez MD   flecainide (TAMBOCOR) 50 MG tablet Take 1 tablet by mouth 2 (Two) Times a Day. 8/2/21   Rita Valadez MD   furosemide (LASIX) 40 MG tablet  Take 0.5 tablets by mouth. 8/18/22   Rita Valadez MD   gabapentin (NEURONTIN) 300 MG capsule Take 1 capsule by mouth Daily.    Rita Valadez MD   Glycopyrrolate-Formoterol (Bevespi Aerosphere) 9-4.8 MCG/ACT aerosol Inhale 2 sprays 2 (Two) Times a Day. 6/11/25   Neris Valencia APRN   HYDROcodone-acetaminophen (NORCO)  MG per tablet hydrocodone 10 mg-acetaminophen 325 mg tablet 5/28/21   Rita Valadez MD   Hydrocortisone, Perianal, (ANUSOL-HC) 2.5 % rectal cream Insert  into the rectum. 5/21/25   Rita Valadez MD   levalbuterol (XOPENEX HFA) 45 MCG/ACT inhaler INHALE 2 PUFFS BY MOUTH FOUR TIMES DAILY AS NEEDED FOR WHEEZING  Patient not taking: Reported on 6/10/2025 9/25/24   Neris Valencia APRN   meclizine (ANTIVERT) 25 MG tablet Take 1 tablet by mouth 4 (Four) Times a Day As Needed for Dizziness.  Patient taking differently: Take 1 tablet by mouth 2 (Two) Times a Day. 12/16/21   Efra Harrison MD   metoprolol succinate XL (TOPROL-XL) 25 MG 24 hr tablet TAKE 1/2 TABLET BY MOUTH EVERY DAY 3/30/22   Chris Manzo MD   mirtazapine (REMERON) 7.5 MG half tablet Take 2 half tablet by mouth Every Night.    Rita Valadez MD   multivitamin (THERAGRAN) tablet tablet Take  by mouth.    Rita Valadez MD   mupirocin (BACTROBAN) 2 % ointment  5/4/25   Rita Valadez MD   ondansetron ODT (ZOFRAN-ODT) 8 MG disintegrating tablet Place 1 tablet on the tongue Every 8 (Eight) Hours As Needed for Nausea or Vomiting. 1/9/25   Lore Espitia APRN   pantoprazole (PROTONIX) 40 MG EC tablet Take 1 tablet by mouth Daily. 1/9/25   Lore Espitia APRN   polyethylene glycol (MIRALAX) 17 g packet Take 17 g by mouth Daily.    Rita Valadez MD   potassium chloride ER (K-TAB) 20 MEQ tablet controlled-release ER tablet Take 1 tablet by mouth. 9/20/24   Provider, MD Rita   prochlorperazine (COMPAZINE) 5 MG tablet TAKE 1 TABLET BY MOUTH EVERY 6 HOURS  AS NEEDED FOR NAUSEA OR VOMITING 4/15/24   Lore Espitia APRN   sacubitril-valsartan (ENTRESTO) 24-26 MG tablet Take 1 tablet by mouth. 24   Rita Valadez MD   sodium-potassium-magnesium sulfates (Suprep Bowel Prep Kit) 17.5-3.13-1.6 GM/177ML solution oral solution Take 1 bottle by mouth Every 12 (Twelve) Hours. 25   Lore Espitia APRN   spironolactone (ALDACTONE) 25 MG tablet Take 1 tablet by mouth Daily.    Rita Valadez MD   tiotropium bromide-olodaterol (Stiolto Respimat) 2.5-2.5 MCG/ACT aerosol solution inhaler Inhale 2 puffs Daily. 6/10/25   Neris Valencia APRN   topiramate (TOPAMAX) 50 MG tablet Take 1 tablet by mouth Daily. 25   Rita Valadez MD        Social History:   Social History     Tobacco Use    Smoking status: Former     Current packs/day: 0.00     Average packs/day: 0.8 packs/day for 25.0 years (20.0 ttl pk-yrs)     Types: Cigarettes     Start date:      Quit date: 2016     Years since quittin.5     Passive exposure: Never    Smokeless tobacco: Never   Vaping Use    Vaping status: Never Used   Substance Use Topics    Alcohol use: Never     Comment: Does not drink    Drug use: Never         Review of Systems:  Review of Systems   Constitutional:  Negative for chills and fever.   HENT:  Negative for congestion, rhinorrhea and sore throat.    Eyes:  Negative for pain and visual disturbance.   Respiratory:  Negative for apnea, cough, chest tightness and shortness of breath.    Cardiovascular:  Positive for chest pain. Negative for palpitations.   Gastrointestinal:  Negative for abdominal pain, diarrhea, nausea and vomiting.   Genitourinary:  Negative for difficulty urinating and dysuria.   Musculoskeletal:  Negative for joint swelling and myalgias.   Skin:  Negative for color change.   Neurological:  Negative for seizures and headaches.   Psychiatric/Behavioral: Negative.     All other systems reviewed and are negative.       Physical  "Exam:  /69   Pulse 60   Temp 98.8 °F (37.1 °C) (Oral)   Resp 10   Ht 172.7 cm (68\")   Wt 120 kg (264 lb 1.8 oz)   SpO2 93%   BMI 40.16 kg/m²         Physical Exam  Vitals and nursing note reviewed.   Constitutional:       General: She is not in acute distress.     Appearance: Normal appearance. She is not toxic-appearing.   HENT:      Head: Normocephalic and atraumatic.      Jaw: There is normal jaw occlusion.      Mouth/Throat:      Mouth: Mucous membranes are moist.   Eyes:      General: Lids are normal.      Extraocular Movements: Extraocular movements intact.      Conjunctiva/sclera: Conjunctivae normal.      Pupils: Pupils are equal, round, and reactive to light.   Cardiovascular:      Rate and Rhythm: Normal rate and regular rhythm.      Pulses: Normal pulses.      Heart sounds: Normal heart sounds.   Pulmonary:      Effort: Pulmonary effort is normal. No respiratory distress.      Breath sounds: Normal breath sounds. No wheezing or rhonchi.   Abdominal:      General: Abdomen is flat. There is no distension.      Palpations: Abdomen is soft.      Tenderness: There is no abdominal tenderness. There is no guarding or rebound.   Musculoskeletal:         General: Normal range of motion.      Cervical back: Normal range of motion and neck supple.      Right lower leg: No edema.      Left lower leg: No edema.   Skin:     General: Skin is warm and dry.   Neurological:      General: No focal deficit present.      Mental Status: She is alert and oriented to person, place, and time. Mental status is at baseline.   Psychiatric:         Mood and Affect: Mood normal.         Behavior: Behavior normal.                            Medical Decision Making:      Comorbidities that affect care:    Hypertension, hyperlipidemia    External Notes reviewed:    Previous Clinic Note: Pulmonology office visit for CRISTINO and emphysema management      The following orders were placed and all results were independently analyzed " by me:  Orders Placed This Encounter   Procedures    XR Chest 1 View    [place] Draw    High Sensitivity Troponin T    Comprehensive Metabolic Panel    Lipase    BNP    Magnesium    CBC Auto Differential    High Sensitivity Troponin T 1Hr    NPO Diet NPO Type: Strict NPO    Undress & Gown    Continuous Pulse Oximetry    Oxygen Therapy- Nasal Cannula; Titrate 1-6 LPM Per SpO2; 90 - 95%    ECG 12 Lead ED Triage Standing Order; Chest Pain    ECG 12 Lead ED Triage Standing Order; Chest Pain    Insert Peripheral IV    CBC & Differential    Green Top (Gel)    Lavender Top    Gold Top - SST    Light Blue Top       Medications Given in the Emergency Department:  Medications   sodium chloride 0.9 % flush 10 mL (has no administration in time range)   aspirin chewable tablet 324 mg (324 mg Oral Not Given 7/2/25 1413)        ED Course:    The patient was initially evaluated in the triage area where orders were placed. The patient was later dispositioned by Julio Leigh MD.      The patient was advised to stay for completion of workup which includes but is not limited to communication of labs and radiological results, reassessment and plan. The patient was advised that leaving prior to disposition by a provider could result in critical findings that are not communicated to the patient.          Labs:    Lab Results (last 24 hours)       Procedure Component Value Units Date/Time    High Sensitivity Troponin T [[id number]]  (Normal) Collected: 07/02/25 1421    Specimen: Blood Updated: 07/02/25 1459     HS Troponin T 7 ng/L     Narrative:      High Sensitive Troponin T Reference Range:  <14.0 ng/L- Negative Female for AMI  <22.0 ng/L- Negative Male for AMI  >=14 - Abnormal Female indicating possible myocardial injury.  >=22 - Abnormal Male indicating possible myocardial injury.   Clinicians would have to utilize clinical acumen, EKG, Troponin, and serial changes to determine if it is an Acute Myocardial Infarction or myocardial  injury due to an underlying chronic condition.         CBC & Differential [[id number]]  (Normal) Collected: 07/02/25 1421    Specimen: Blood Updated: 07/02/25 1434    Narrative:      The following orders were created for panel order CBC & Differential.  Procedure                               Abnormality         Status                     ---------                               -----------         ------                     CBC Auto Differential[[id number]]        Normal              Final result                 Please view results for these tests on the individual orders.    Comprehensive Metabolic Panel [[id number]]  (Abnormal) Collected: 07/02/25 1421    Specimen: Blood Updated: 07/02/25 1459     Glucose 101 mg/dL      BUN 15.7 mg/dL      Creatinine 1.25 mg/dL      Sodium 137 mmol/L      Potassium 4.4 mmol/L      Chloride 103 mmol/L      CO2 22.6 mmol/L      Calcium 9.6 mg/dL      Total Protein 7.3 g/dL      Albumin 4.9 g/dL      ALT (SGPT) 17 U/L      AST (SGOT) 19 U/L      Alkaline Phosphatase 99 U/L      Total Bilirubin 0.7 mg/dL      Globulin 2.4 gm/dL      A/G Ratio 2.0 g/dL      BUN/Creatinine Ratio 12.6     Anion Gap 11.4 mmol/L      eGFR 45.3 mL/min/1.73     Narrative:      GFR Categories in Chronic Kidney Disease (CKD)              GFR Category          GFR (mL/min/1.73)    Interpretation  G1                    90 or greater        Normal or high (1)  G2                    60-89                Mild decrease (1)  G3a                   45-59                Mild to moderate decrease  G3b                   30-44                Moderate to severe decrease  G4                    15-29                Severe decrease  G5                    14 or less           Kidney failure    (1)In the absence of evidence of kidney disease, neither GFR category G1 or G2 fulfill the criteria for CKD.    eGFR calculation 2021 CKD-EPI creatinine equation, which does not include race as a factor    Lipase [[id number]]  (Normal)  Collected: 07/02/25 1421    Specimen: Blood Updated: 07/02/25 1459     Lipase 36 U/L     BNP [[id number]]  (Normal) Collected: 07/02/25 1421    Specimen: Blood Updated: 07/02/25 1456     proBNP 44.5 pg/mL     Narrative:      This assay is used as an aid in the diagnosis of individuals suspected of having heart failure. It can be used as an aid in the diagnosis of acute decompensated heart failure (ADHF) in patients presenting with signs and symptoms of ADHF to the emergency department (ED). In addition, NT-proBNP of <300 pg/mL indicates ADHF is not likely.    Age Range Result Interpretation  NT-proBNP Concentration (pg/mL:      <50             Positive            >450                   Gray                 300-450                    Negative             <300    50-75           Positive            >900                  Gray                300-900                  Negative            <300      >75             Positive            >1800                  Gray                300-1800                  Negative            <300    Magnesium [[id number]]  (Normal) Collected: 07/02/25 1421    Specimen: Blood Updated: 07/02/25 1459     Magnesium 2.3 mg/dL     CBC Auto Differential [[id number]]  (Normal) Collected: 07/02/25 1421    Specimen: Blood Updated: 07/02/25 1434     WBC 8.48 10*3/mm3      RBC 4.57 10*6/mm3      Hemoglobin 14.6 g/dL      Hematocrit 42.7 %      MCV 93.4 fL      MCH 31.9 pg      MCHC 34.2 g/dL      RDW 14.5 %      RDW-SD 48.9 fl      MPV 10.5 fL      Platelets 281 10*3/mm3      Neutrophil % 62.7 %      Lymphocyte % 27.6 %      Monocyte % 7.2 %      Eosinophil % 1.8 %      Basophil % 0.6 %      Immature Grans % 0.1 %      Neutrophils, Absolute 5.32 10*3/mm3      Lymphocytes, Absolute 2.34 10*3/mm3      Monocytes, Absolute 0.61 10*3/mm3      Eosinophils, Absolute 0.15 10*3/mm3      Basophils, Absolute 0.05 10*3/mm3      Immature Grans, Absolute 0.01 10*3/mm3      nRBC 0.0 /100 WBC     High Sensitivity Troponin T  1Hr [[id number]]  (Normal) Collected: 07/02/25 1650    Specimen: Blood from Arm, Left Updated: 07/02/25 1720     HS Troponin T 7 ng/L      Troponin T Numeric Delta 0 ng/L     Narrative:      High Sensitive Troponin T Reference Range:  <14.0 ng/L- Negative Female for AMI  <22.0 ng/L- Negative Male for AMI  >=14 - Abnormal Female indicating possible myocardial injury.  >=22 - Abnormal Male indicating possible myocardial injury.   Clinicians would have to utilize clinical acumen, EKG, Troponin, and serial changes to determine if it is an Acute Myocardial Infarction or myocardial injury due to an underlying chronic condition.                  Imaging:    XR Chest 1 View  Result Date: 7/2/2025  XR CHEST 1 VW Date of Exam: 7/2/2025 2:20 PM EDT Indication: Chest Pain Triage Protocol Comparison: CT dated Fatou 10, 2025 Findings: Dual-lead pacer is in place well-positioned. Heart is normal in size. The aorta is mildly elongated and calcified Chronic reticular opacity noted bilaterally. No acute opacities are suggested.     Impression: No acute cardiopulmonary disease Electronically Signed: Sony Boudreaux MD  7/2/2025 2:56 PM EDT  Workstation ID: [id number]        Differential Diagnosis and Discussion:      Chest Pain:  Based on the patient's signs and symptoms, I considered aortic dissection, myocardial infaction, pulmonary embolism, cardiac tamponade, pericarditis, pneumothorax, musculoskeletal chest pain and other differential diagnosis as an etiology of the patient's chest pain.     PROCEDURES:    Labs were collected in the emergency department and all labs were reviewed and interpreted by me.  X-ray were performed in the emergency department and all X-ray impressions were independently interpreted by me.  An EKG was performed and the EKG was interpreted by me.    ECG 12 Lead ED Triage Standing Order; Chest Pain   Preliminary Result   HEART RATE=62  bpm   RR Bhxststt=552  ms   SC Pfophkbx=992  ms   P Horizontal Axis=-50   deg   P Front Axis=0  deg   QRSD Akqoddls=702  ms   QT Wwjhawam=915  ms   NUqC=268  ms   QRS Axis=113  deg   T Wave Axis=33  deg   - BORDERLINE ECG -   Sinus rhythm   Borderline prolonged NV interval   Right axis deviation   Abnormal R-wave progression, late transition   Borderline T abnormalities, anterior leads   When compared with ECG of 17-Feb-2023 17:52:17,   Significant axis, voltage or hypertrophy change   Date and Time of Study:2025-07-02 14:09:08         My interpretation of the EKG shows normal sinus rhythm, normal rate, normal QT, no acute ischemia    Procedures    MDM  Number of Diagnoses or Management Options  Anxiety  Chest pain, unspecified type  Diagnosis management comments: In summary this is 74-year-old female who presents to the Emergency Department for evaluation of chest pain.  She is low risk heart score.  CBC independently reviewed and interpreted by me and shows no critical abnormalities.  CMP independently reviewed and interpreted by me and shows no critical abnormalities.  High-sensitivity troponin independently reviewed and interpreted by me shows no critical abnormalities x2.  Chest x-ray reviewed by me is unremarkable negative for acute pathology.  Very strict return to ER and follow-up instructions have been provided to the patient.                       Patient Care Considerations:    CONSULT: I considered consulting cardiology, however negative cardiac workup      Consultants/Shared Management Plan:    None    Social Determinants of Health:    Patient has presented with family members who are responsible, reliable and will ensure follow up care.      Disposition and Care Coordination:    Discharged: I considered escalation of care by admitting this patient to the hospital, however negative cardiac workup, low risk heart score therefore no indication for admission at this time    I have explained the patient´s condition, diagnoses and treatment plan based on the information available  to me at this time. I have answered questions and addressed any concerns. The patient has a good  understanding of the patient´s diagnosis, condition, and treatment plan as can be expected at this point. The vital signs have been stable. The patient´s condition is stable and appropriate for discharge from the emergency department.      The patient will pursue further outpatient evaluation with the primary care physician or other designated or consulting physician as outlined in the discharge instructions. They are agreeable to this plan of care and follow-up instructions have been explained in detail. The patient has received these instructions in written format and has expressed an understanding of the discharge instructions. The patient is aware that any significant change in condition or worsening of symptoms should prompt an immediate return to this or the closest emergency department or call to 911.  I have explained discharge medications and the need for follow up with the patient/caretakers. This was also printed in the discharge instructions. Patient was discharged with the following medications and follow up:      Medication List        Changed      meclizine 25 MG tablet  Commonly known as: ANTIVERT  Take 1 tablet by mouth 4 (Four) Times a Day As Needed for Dizziness.  What changed: when to take this           Wilder Morales MD  [address]  [address] [place], [place] [id number] [phone]    In 1 week         Final diagnoses:   Chest pain, unspecified type   Anxiety        ED Disposition       ED Disposition   Discharge    Condition   Stable    Comment   --               This medical record created using voice recognition software.             Julio Leigh MD  07/02/25 1473

## 2025-07-03 LAB
QT INTERVAL: 444 MS
QTC INTERVAL: 453 MS

## 2025-07-17 NOTE — PRE-PROCEDURE INSTRUCTIONS
"PAT call attempted.  No answer.  Detailed message with date and arrival time of 0930 given.  Instructed that arrival time is not procedure time but allows time to prepare for procedure. Come to entrance \"C\"; must have adult  for transportation home; may have two visitors; however, children under 12 must remain in waiting area; instructed on diet/clear liquids/NPO/bowel prep, if needed; may take normal meds two hours prior to arrival time except for blood thinners, antidiabetics, diuretics, and weight loss meds.  Absolutely nothing by mouth for 2 hours prior to arrival. Leave jewelry and valuables at home.  Expect to be at hospital for 3-4 hours.  Instructed to return call to confirm receipt of instructions and for any questions.  Hold Eliquis for 2 days prior to procedure.  Pulmonary and blood thinner clearances noted in chart.  " WDL

## 2025-07-30 ENCOUNTER — ANESTHESIA EVENT (OUTPATIENT)
Dept: GASTROENTEROLOGY | Facility: HOSPITAL | Age: 75
End: 2025-07-30
Payer: MEDICARE

## 2025-07-30 NOTE — ANESTHESIA PREPROCEDURE EVALUATION
Anesthesia Evaluation     Patient summary reviewed and Nursing notes reviewed   NPO Solid Status: > 8 hours  NPO Liquid Status: > 2 hours           Airway   Mallampati: III  TM distance: >3 FB  Neck ROM: full  No difficulty expected  Dental - normal exam     Pulmonary - normal exam    breath sounds clear to auscultation  (+) a smoker (quit 2016 20 pack year) Former, cigarettes, COPD moderate, asthma,home oxygen (2L overnight), shortness of breath, sleep apnea    ROS comment: Pulmonary clearance on chart  Cardiovascular - normal exam  Exercise tolerance: good (4-7 METS)    PT is on anticoagulation therapy  Patient on routine beta blocker  Rhythm: regular  Rate: normal    (+) pacemaker (placed 1 year ago) pacemaker, hypertension well controlled less than 2 medications, dysrhythmias Atrial Fib, CHF Diastolic >=55%, hyperlipidemia    ROS comment: Last dose eliquis 7/27/25    Can't find interrogation note patient relates transmits once a week     Neuro/Psych  (+) psychiatric history Depression  GI/Hepatic/Renal/Endo    (+) obesity, morbid obesity (40 BMI), GERD (protonix) well controlled, GI bleeding lower active bleeding, renal disease (CKD stage 3)- CRI    Musculoskeletal     Abdominal   (+) obese   Substance History - negative use     OB/GYN          Other   arthritis,     ROS/Med Hx Other: Cardiac Clearance in chart    BORDERLINE ECG -  Sinus rhythm  Borderline  prolonged UT interval  Right axis deviation  Abnormal R-wave progression, late transition  Borderline  T abnormalities, anterior leads  When compared with ECG of 17-Feb-2023 17:52:17,  Significant axis, voltage or hypertrophy change  Electronically Signed By: James Espinal ([facility]) 2025-07-03 09:15:42  Date and Time of Study:2025-07-02 14:09:08      12/31/19 Echo  CONCLUSIONS:    1. Normal left ventricular chamber size with normal left ventricular systolic       function.    2. Grade 1 left ventricular diastolic dysfunction.    3. No definite valvular  abnormalities noted.   4. EF 60-65%                    Anesthesia Plan    ASA 3     general   total IV anesthesia  (Patient understands anesthesia not responsible for dental damage. Risks explained including allergic reactions, BP, HR, O2 changes, aspiration, advanced airway placement. Pt verbalized understanding.)  intravenous induction     Anesthetic plan, risks, benefits, and alternatives have been provided, discussed and informed consent has been obtained with: patient.  Pre-procedure education provided  Plan discussed with CRNA.        CODE STATUS:

## 2025-07-31 ENCOUNTER — TELEPHONE (OUTPATIENT)
Dept: GASTROENTEROLOGY | Facility: CLINIC | Age: 75
End: 2025-07-31
Payer: MEDICARE

## 2025-07-31 ENCOUNTER — HOSPITAL ENCOUNTER (OUTPATIENT)
Facility: HOSPITAL | Age: 75
Setting detail: HOSPITAL OUTPATIENT SURGERY
Discharge: HOME | End: 2025-07-31
Attending: INTERNAL MEDICINE | Admitting: INTERNAL MEDICINE
Payer: MEDICARE

## 2025-07-31 ENCOUNTER — ANESTHESIA (OUTPATIENT)
Dept: GASTROENTEROLOGY | Facility: HOSPITAL | Age: 75
End: 2025-07-31
Payer: MEDICARE

## 2025-07-31 VITALS
WEIGHT: 263.89 LBS | DIASTOLIC BLOOD PRESSURE: 76 MMHG | RESPIRATION RATE: 16 BRPM | HEART RATE: 60 BPM | SYSTOLIC BLOOD PRESSURE: 130 MMHG | BODY MASS INDEX: 40.12 KG/M2 | OXYGEN SATURATION: 94 % | TEMPERATURE: 97.6 F

## 2025-07-31 DIAGNOSIS — K62.5 RECTAL BLEEDING: ICD-10-CM

## 2025-07-31 PROCEDURE — 25810000003 SODIUM CHLORIDE 0.9 % SOLUTION

## 2025-07-31 PROCEDURE — 25010000002 PROPOFOL 10 MG/ML EMULSION: Performed by: NURSE ANESTHETIST, CERTIFIED REGISTERED

## 2025-07-31 PROCEDURE — 88305 TISSUE EXAM BY PATHOLOGIST: CPT | Performed by: INTERNAL MEDICINE

## 2025-07-31 PROCEDURE — 45385 COLONOSCOPY W/LESION REMOVAL: CPT | Performed by: INTERNAL MEDICINE

## 2025-07-31 PROCEDURE — 25010000002 LIDOCAINE PF 2% 2 % SOLUTION: Performed by: NURSE ANESTHETIST, CERTIFIED REGISTERED

## 2025-07-31 PROCEDURE — 25010000002 ONDANSETRON PER 1 MG: Performed by: NURSE ANESTHETIST, CERTIFIED REGISTERED

## 2025-07-31 RX ORDER — SODIUM CHLORIDE 9 MG/ML
9 INJECTION, SOLUTION INTRAVENOUS CONTINUOUS
Status: DISCONTINUED | OUTPATIENT
Start: 2025-07-31 | End: 2025-07-31 | Stop reason: HOSPADM

## 2025-07-31 RX ORDER — ONDANSETRON 2 MG/ML
INJECTION INTRAMUSCULAR; INTRAVENOUS AS NEEDED
Status: DISCONTINUED | OUTPATIENT
Start: 2025-07-31 | End: 2025-07-31 | Stop reason: SURG

## 2025-07-31 RX ORDER — PROPOFOL 10 MG/ML
VIAL (ML) INTRAVENOUS AS NEEDED
Status: DISCONTINUED | OUTPATIENT
Start: 2025-07-31 | End: 2025-07-31 | Stop reason: SURG

## 2025-07-31 RX ORDER — LIDOCAINE HYDROCHLORIDE 20 MG/ML
INJECTION, SOLUTION EPIDURAL; INFILTRATION; INTRACAUDAL; PERINEURAL AS NEEDED
Status: DISCONTINUED | OUTPATIENT
Start: 2025-07-31 | End: 2025-07-31 | Stop reason: SURG

## 2025-07-31 RX ORDER — HYDROCORTISONE ACETATE 25 MG/1
25 SUPPOSITORY RECTAL 2 TIMES DAILY
Qty: 28 SUPPOSITORY | Refills: 0 | Status: SHIPPED | OUTPATIENT
Start: 2025-07-31 | End: 2025-08-14

## 2025-07-31 RX ADMIN — ONDANSETRON 4 MG: 2 INJECTION INTRAMUSCULAR; INTRAVENOUS at 12:02

## 2025-07-31 RX ADMIN — SODIUM CHLORIDE 9 ML: 9 INJECTION, SOLUTION INTRAVENOUS at 10:51

## 2025-07-31 RX ADMIN — PROPOFOL 200 MCG/KG/MIN: 10 INJECTION, EMULSION INTRAVENOUS at 12:02

## 2025-07-31 RX ADMIN — LIDOCAINE HYDROCHLORIDE 60 MG: 20 INJECTION, SOLUTION EPIDURAL; INFILTRATION; INTRACAUDAL; PERINEURAL at 12:02

## 2025-07-31 RX ADMIN — PROPOFOL 100 MG: 10 INJECTION, EMULSION INTRAVENOUS at 12:02

## 2025-07-31 NOTE — ANESTHESIA POSTPROCEDURE EVALUATION
Patient: Nica Traylor    Procedure Summary       Date: 07/31/25 Room / Location: [facility] ENDOSCOPY 3 / [facility] ENDOSCOPY    Anesthesia Start: 1158 Anesthesia Stop: 1235    Procedure: COLONOSCOPY WITH POLYPECTOMIES Diagnosis:       Rectal bleeding      (Rectal bleeding [K62.5])    Surgeons: Elisa Dior MD Provider: Eilsa Galeano CRNA    Anesthesia Type: general ASA Status: 3            Anesthesia Type: general    Vitals  Vitals Value Taken Time   /76 07/31/25 13:05   Temp 36.4 °C (97.6 °F) 07/31/25 12:35   Pulse 60 07/31/25 13:09   Resp 16 07/31/25 13:05   SpO2 95 % 07/31/25 13:09   Vitals shown include unfiled device data.        Post Anesthesia Care and Evaluation    Patient location during evaluation: bedside  Patient participation: complete - patient participated  Level of consciousness: awake  Pain management: adequate    Airway patency: patent  Anesthetic complications: No anesthetic complications  PONV Status: controlled  Cardiovascular status: acceptable and stable  Respiratory status: acceptable

## 2025-07-31 NOTE — H&P
Pre Procedure History & Physical    Chief Complaint:   Rectal bleeding    Subjective     HPI:   75 yo F here for eval of rectal bleeding.    Past Medical History:   Past Medical History:   Diagnosis Date    Asthma     Burning mouth syndrome     COPD (chronic obstructive pulmonary disease)     Depression     Diastolic CHF 6/24/2021    Essential hypertension 6/20/2012    GERD (gastroesophageal reflux disease)     Hyperlipidemia     Oral lesion     Paroxysmal atrial fibrillation 05/24/2021       Past Surgical History:  Past Surgical History:   Procedure Laterality Date    BLADDER SURGERY      CARPAL TUNNEL RELEASE      COLONOSCOPY      ENDOSCOPY  2020    GALLBLADDER SURGERY  2003    HYSTERECTOMY      PACEMAKER IMPLANTATION      UPPER GASTROINTESTINAL ENDOSCOPY         Family History:  Family History   Problem Relation Age of Onset    Stroke Mother     Heart disease Mother     Diabetes Mother     Pancreatic cancer Mother     Heart disease Father     Heart disease Sister     Cervical cancer Sister     Uterine cancer Sister     Heart disease Brother     Stroke Other         Maternal grandparent/Paternal grandparent    Heart disease Other         Maternal grandparent/Paternal grandparent    Stroke Maternal Aunt     Heart disease Maternal Aunt        Social History:   reports that she quit smoking about 9 years ago. Her smoking use included cigarettes. She started smoking about 34 years ago. She has a 20 pack-year smoking history. She has never been exposed to tobacco smoke. She has never used smokeless tobacco. She reports that she does not drink alcohol and does not use drugs.    Medications:   Medications Prior to Admission   Medication Sig Dispense Refill Last Dose/Taking    acetaminophen (TYLENOL) 500 MG tablet Take 1 tablet by mouth.   7/30/2025    albuterol sulfate  (90 Base) MCG/ACT inhaler Inhale 2 puffs.   Past Week    allopurinol (ZYLOPRIM) 300 MG tablet Take 1 tablet by mouth Daily.   7/31/2025     atorvastatin (LIPITOR) 80 MG tablet Take 1 tablet by mouth Daily.   7/30/2025    baclofen (LIORESAL) 10 MG tablet Take 1 tablet by mouth 3 (Three) Times a Day.   7/30/2025    Cholecalciferol (VITAMIN D3 PO) Take 2,000 Units by mouth Daily.   7/31/2025    citalopram (CeleXA) 20 MG tablet Take 1 tablet by mouth Daily.   7/31/2025    diazePAM (VALIUM) 2 MG tablet Take 1 tablet by mouth Every 6 (Six) Hours As Needed (As needed for vertigo). 10 tablet 0 7/30/2025    diphenhydrAMINE (BENADRYL) 25 MG tablet Take 1 tablet by mouth Every 6 (Six) Hours As Needed.   7/30/2025    estradiol (ESTRACE) 0.1 MG/GM vaginal cream estradiol 0.01% (0.1 mg/gram) vaginal cream   Past Week    furosemide (LASIX) 40 MG tablet Take 0.5 tablets by mouth.   Past Week    gabapentin (NEURONTIN) 300 MG capsule Take 1 capsule by mouth Daily.   7/30/2025    HYDROcodone-acetaminophen (NORCO)  MG per tablet hydrocodone 10 mg-acetaminophen 325 mg tablet   7/30/2025    Hydrocortisone, Perianal, (ANUSOL-HC) 2.5 % rectal cream Insert  into the rectum.   Past Week    meclizine (ANTIVERT) 25 MG tablet Take 1 tablet by mouth 4 (Four) Times a Day As Needed for Dizziness. (Patient taking differently: Take 1 tablet by mouth 2 (Two) Times a Day.) 60 tablet 0 7/31/2025    mirtazapine (REMERON) 7.5 MG half tablet Take 2 half tablet by mouth Every Night.   7/31/2025    multivitamin (THERAGRAN) tablet tablet Take  by mouth.   7/31/2025    ondansetron ODT (ZOFRAN-ODT) 8 MG disintegrating tablet Place 1 tablet on the tongue Every 8 (Eight) Hours As Needed for Nausea or Vomiting. 30 tablet 1 7/31/2025    pantoprazole (PROTONIX) 40 MG EC tablet Take 1 tablet by mouth Daily. 90 tablet 2 7/31/2025    potassium chloride ER (K-TAB) 20 MEQ tablet controlled-release ER tablet Take 1 tablet by mouth.   Past Week    sacubitril-valsartan (ENTRESTO) 24-26 MG tablet Take 1 tablet by mouth.   7/31/2025    spironolactone (ALDACTONE) 25 MG tablet Take 1 tablet by mouth Daily.    7/30/2025    tiotropium bromide-olodaterol (Stiolto Respimat) 2.5-2.5 MCG/ACT aerosol solution inhaler Inhale 2 puffs Daily. 3 each 3 7/30/2025    topiramate (TOPAMAX) 50 MG tablet Take 1 tablet by mouth Daily.   7/30/2025    Eliquis 5 MG tablet tablet TAKE 1 TABLET BY MOUTH TWICE A  tablet 3 7/27/2025    Glycopyrrolate-Formoterol (Bevespi Aerosphere) 9-4.8 MCG/ACT aerosol Inhale 2 sprays 2 (Two) Times a Day. 1 each 5 Unknown    mupirocin (BACTROBAN) 2 % ointment    Unknown    polyethylene glycol (MIRALAX) 17 g packet Take 17 g by mouth Daily.   More than a month    prochlorperazine (COMPAZINE) 5 MG tablet TAKE 1 TABLET BY MOUTH EVERY 6 HOURS AS NEEDED FOR NAUSEA OR VOMITING 30 tablet 1 More than a month       Allergies:  Propafenone, Cephalexin, Erythromycin, Farxiga [dapagliflozin], Mercury, Metoclopramide, Moxifloxacin hcl, and Penicillins    ROS:    Pertinent items are noted in HPI     Objective     Blood pressure 115/71, pulse 60, temperature 97.4 °F (36.3 °C), temperature source Temporal, resp. rate 18, weight 120 kg (263 lb 14.3 oz), SpO2 94%.    Physical Exam   Constitutional: Pt is oriented to person, place, and time and well-developed, well-nourished, and in no distress.   Mouth/Throat: Oropharynx is clear and moist.   Neck: Normal range of motion.   Cardiovascular: Normal rate, regular rhythm and normal heart sounds.    Pulmonary/Chest: Effort normal and breath sounds normal.   Abdominal: Soft. Nontender  Skin: Skin is warm and dry.   Psychiatric: Mood, memory, affect and judgment normal.     Assessment & Plan     Diagnosis:  Rectal bleeding    Anticipated Surgical Procedure:  Colonoscopy    The risks, benefits, and alternatives of this procedure have been discussed with the patient or the responsible party- the patient understands and agrees to proceed.

## 2025-07-31 NOTE — TELEPHONE ENCOUNTER
Hub staff attempted to follow warm transfer process and was unsuccessful     Caller: Nica Traylor    Relationship to patient: Self    Best call back number: [phone]    Patient is needing: PATIENT CALLED IN AND WOULD LIKE TO KNOW WHEN SHE SHOULD START BACK ON HER ELIQUIS MEDICATION, THIS EVENING OR IN THE MORNING. PLEASE CALL BACK ANYTIME.

## 2025-08-01 ENCOUNTER — TELEPHONE (OUTPATIENT)
Dept: GASTROENTEROLOGY | Facility: CLINIC | Age: 75
End: 2025-08-01
Payer: MEDICARE

## 2025-08-01 LAB
CYTO UR: NORMAL
LAB AP CASE REPORT: NORMAL
LAB AP CLINICAL INFORMATION: NORMAL
PATH REPORT.FINAL DX SPEC: NORMAL
PATH REPORT.GROSS SPEC: NORMAL

## 2025-08-01 NOTE — TELEPHONE ENCOUNTER
Elisa Dior MD to Me  (Selected Message)        7/31/25  6:04 PM  OK to restart Eliquis 48 hours after procedure if no signs of bleeding (Saturday).    Pt notified of recommendations.

## 2025-08-01 NOTE — TELEPHONE ENCOUNTER
Per telephone encounter from yesterday patient can restart her blood thinner if no signs of bleeding on Saturday.

## 2025-08-01 NOTE — TELEPHONE ENCOUNTER
Patient stated that someone called her yesterday to not take her blood thinner for 48 hours. She didn't get the message till after she already took it. She is wondering if she need to not take the evening does. She would like a call back asap.

## 2025-08-05 ENCOUNTER — RESULTS FOLLOW-UP (OUTPATIENT)
Dept: GASTROENTEROLOGY | Facility: HOSPITAL | Age: 75
End: 2025-08-05
Payer: MEDICARE

## (undated) DEVICE — THE STERILE LIGHT HANDLE COVER IS USED WITH STERIS SURGICAL LIGHTING AND VISUALIZATION SYSTEMS.

## (undated) DEVICE — SOL IRRG H2O PL/BG 1000ML STRL

## (undated) DEVICE — CONN JET HYDRA H20 AUXILIARY DISP

## (undated) DEVICE — SNAR POLYP CAPTIFLEX XS/OVL 11X2.4MM 240CM 1P/U

## (undated) DEVICE — SOLIDIFIER LIQLOC PLS 1500CC BT

## (undated) DEVICE — Device

## (undated) DEVICE — LINER SURG CANSTR SXN S/RIGD 1500CC

## (undated) DEVICE — DEFENDO AIR WATER SUCTION AND BIOPSY VALVE KIT FOR  OLYMPUS: Brand: DEFENDO AIR/WATER/SUCTION AND BIOPSY VALVE

## (undated) DEVICE — THE SINGLE USE ETRAP – POLYP TRAP IS USED FOR SUCTION RETRIEVAL OF ENDOSCOPICALLY REMOVED POLYPS.: Brand: ETRAP